# Patient Record
Sex: FEMALE | Race: BLACK OR AFRICAN AMERICAN | Employment: FULL TIME | URBAN - METROPOLITAN AREA
[De-identification: names, ages, dates, MRNs, and addresses within clinical notes are randomized per-mention and may not be internally consistent; named-entity substitution may affect disease eponyms.]

---

## 2019-02-13 ENCOUNTER — HOSPITAL ENCOUNTER (EMERGENCY)
Age: 34
Discharge: HOME | End: 2019-02-13
Attending: EMERGENCY MEDICINE
Payer: COMMERCIAL

## 2019-02-13 VITALS
WEIGHT: 169 LBS | BODY MASS INDEX: 26.53 KG/M2 | TEMPERATURE: 98.6 F | OXYGEN SATURATION: 99 % | HEIGHT: 67 IN | HEART RATE: 98 BPM | DIASTOLIC BLOOD PRESSURE: 75 MMHG | RESPIRATION RATE: 22 BRPM | SYSTOLIC BLOOD PRESSURE: 124 MMHG

## 2019-02-13 DIAGNOSIS — J10.1 INFLUENZA A: Primary | ICD-10-CM

## 2019-02-13 PROCEDURE — 99283 EMERGENCY DEPT VISIT LOW MDM: CPT

## 2019-02-13 RX ORDER — IBUPROFEN 800 MG/1
800 TABLET ORAL
Qty: 20 TAB | Refills: 0 | Status: SHIPPED | OUTPATIENT
Start: 2019-02-13 | End: 2019-02-20

## 2019-02-13 NOTE — LETTER
[facility] EMERGENCY DEPT 
[address] [place] [address] [address] [id number] 
[phone] Work/School Note Date: 2/13/2019 To Whom It May concern: 
 
Ajit Balderrama was seen and treated today in the emergency room by the following provider(s): 
Attending Provider: Altagracia Pierre MD.   
 
Ajit Balderrama may return to work on 02/15/19. Sincerely, Neal Caballero RN

## 2019-02-14 NOTE — ED PROVIDER NOTES
EMERGENCY DEPARTMENT HISTORY AND PHYSICAL EXAM 
 
 
Date: 2019 Patient Name: Viridiana Dennis History of Presenting Illness Chief Complaint Patient presents with  Nasal Congestion  Headache History Provided By: Patient HPI: Viridiana Dennis, 29 y.o. female presents ambulatory to the ED for evaluation of mild to moderate congestion, HA, cough x 2.5 weeks that worsened over the past 3 days. Pt also reports low grade fever at home today. She states she had been taking OTC medications with temporary relief and notes she did not take any today but she did take some olive leaf. She notes her daughters were both flu A positive at OSH yesterday. She states she got the flu shot this year. Pt specifically denies any abd pain, NVD, CP, SOB, numbness, weakness. There are no other complaints, changes, or physical findings at this time. PCP: None No current facility-administered medications on file prior to encounter. Current Outpatient Medications on File Prior to Encounter Medication Sig Dispense Refill  prenatal multivit-ca-min-fe-fa tab Take  by mouth daily.  promethazine (PHENERGAN) 25 mg suppository Insert 1 Suppository into rectum every six (6) hours as needed for Nausea. 15 Suppository 0  
 ondansetron (ZOFRAN ODT) 8 mg disintegrating tablet Take 1 Tab by mouth every eight (8) hours as needed for Nausea. 12 Tab 0 Past History Past Medical History: 
Past Medical History:  
Diagnosis Date  Other ill-defined conditions(099.89) HSV Past Surgical History: 
Past Surgical History:  
Procedure Laterality Date  HX  SECTION Family History: 
History reviewed. No pertinent family history. Social History: 
Social History Tobacco Use  Smoking status: Never Smoker  Smokeless tobacco: Never Used Substance Use Topics  Alcohol use: No  
  Comment: occ  Drug use: No  
 
 
Allergies: Allergies Allergen Reactions  Cabbage Itching and Nausea and Vomiting  Tape [Adhesive] Rash Review of Systems Review of Systems Constitutional: Positive for fever. Negative for chills and unexpected weight change. HENT: Positive for congestion. Negative for trouble swallowing. Eyes: Negative for discharge. Respiratory: Positive for cough. Negative for chest tightness and shortness of breath. Cardiovascular: Negative. Negative for chest pain. Gastrointestinal: Negative. Negative for abdominal distention, abdominal pain, constipation, diarrhea, nausea and vomiting. Endocrine: Negative. Genitourinary: Negative. Negative for difficulty urinating, dysuria, frequency and urgency. Musculoskeletal: Negative. Negative for arthralgias and myalgias. Skin: Negative. Negative for color change. Allergic/Immunologic: Negative. Neurological: Positive for headaches. Negative for dizziness, speech difficulty, weakness and numbness. Hematological: Negative. Psychiatric/Behavioral: Negative. Negative for agitation and confusion. All other systems reviewed and are negative. Physical Exam  
Physical Exam  
Constitutional: She is oriented to person, place, and time. She appears well-developed and well-nourished. HENT:  
Head: Normocephalic and atraumatic. Nose: Rhinorrhea present. Mouth/Throat: Posterior oropharyngeal erythema present. No oropharyngeal exudate or posterior oropharyngeal edema. Anterior cervical lymphadenopathy. Mild sinus tenderness. Eyes: Conjunctivae and EOM are normal.  
Neck: Neck supple. Cardiovascular: Normal rate, regular rhythm and intact distal pulses. Pulmonary/Chest: Effort normal. No respiratory distress. Abdominal: Soft. There is no tenderness. Musculoskeletal: Normal range of motion. She exhibits no deformity. Neurological: She is alert and oriented to person, place, and time. Skin: Skin is warm and dry. Psychiatric: She has a normal mood and affect. Her behavior is normal. Thought content normal.  
Vitals reviewed. Medical Decision Making I am the first provider for this patient. I reviewed the vital signs, available nursing notes, past medical history, past surgical history, family history and social history. Vital Signs-Reviewed the patient's vital signs. Patient Vitals for the past 12 hrs: 
 Temp Pulse Resp BP SpO2  
02/13/19 2035 98.6 °F (37 °C) 98 22 124/75 99 % Pulse Oximetry Analysis - 100% on RA Cardiac Monitor:  
Rate: 98 bpm 
 
Records Reviewed: Nursing Notes and Old Medical Records Provider Notes (Medical Decision Making): DDx: influenza, viral syndrome ED Course:  
Initial assessment performed. The patients presenting problems have been discussed, and they are in agreement with the care plan formulated and outlined with them. I have encouraged them to ask questions as they arise throughout their visit. Critical Care Time:  
none Disposition: 
DISCHARGE 
10:03 PM 
The patient has been re-evaluated and is ready for discharge. Reviewed available results with patient. Counseled pt on diagnosis and care plan. Pt has expressed understanding, and all questions have been answered. Pt agrees with plan and agrees to follow up as recommended, or return to the ED if their symptoms worsen. Discharge instructions have been provided and explained to the pt, along with reasons to return to the ED. PLAN: 
1. Discharge Medication List as of 2/13/2019 10:01 PM  
  
START taking these medications Details  
ibuprofen (MOTRIN) 800 mg tablet Take 1 Tab by mouth every six (6) hours as needed for Pain for up to 7 days. , Print, Disp-20 Tab, R-0  
  
ilfzoyqco-OF-okbunmmb-guaifen 5--200 mg cap Take 1 Tab by mouth every four (4) hours as needed. For congestion, Print, Disp-20 Cap, R-0  
  
  
CONTINUE these medications which have NOT CHANGED Details prenatal multivit-ca-min-fe-fa tab Take  by mouth daily. , Historical Med  
  
promethazine (PHENERGAN) 25 mg suppository Insert 1 Suppository into rectum every six (6) hours as needed for Nausea. , Print, Disp-15 Suppository, R-0  
  
ondansetron (ZOFRAN ODT) 8 mg disintegrating tablet Take 1 Tab by mouth every eight (8) hours as needed for Nausea. , Print, Disp-12 Tab, R-0  
  
  
 
2. Follow-up Information Follow up With Specialties Details Why Contact Info [id number]  Schedule an appointment as soon as possible for a visit  [address] [address] [facility] EMERGENCY DEPT Emergency Medicine  As needed, If symptoms worsen [address] Return to ED if worse Diagnosis Clinical Impression:  
1. Influenza A Attestations: This note is prepared by Robert Peraza, acting as Scribe for Saray Howell MD. 
 
Saray Howell MD: The scribe's documentation has been prepared under my direction and personally reviewed by me in its entirety. I confirm that the note above accurately reflects all work, treatment, procedures, and medical decision making performed by me.

## 2019-02-14 NOTE — DISCHARGE INSTRUCTIONS
Patient Education        Influenza (Flu): Care Instructions  Your Care Instructions    Influenza (flu) is an infection in the lungs and breathing passages. It is caused by the influenza virus. There are different strains, or types, of the flu virus from year to year. Unlike the common cold, the flu comes on suddenly and the symptoms, such as a cough, congestion, fever, chills, fatigue, aches, and pains, are more severe. These symptoms may last up to 10 days. Although the flu can make you feel very sick, it usually doesn't cause serious health problems. Home treatment is usually all you need for flu symptoms. But your doctor may prescribe antiviral medicine to prevent other health problems, such as pneumonia, from developing. Older people and those who have a long-term health condition, such as lung disease, are most at risk for having pneumonia or other health problems. Follow-up care is a key part of your treatment and safety. Be sure to make and go to all appointments, and call your doctor if you are having problems. It's also a good idea to know your test results and keep a list of the medicines you take. How can you care for yourself at home? · Get plenty of rest.  · Drink plenty of fluids, enough so that your urine is light yellow or clear like water. If you have kidney, heart, or liver disease and have to limit fluids, talk with your doctor before you increase the amount of fluids you drink. · Take an over-the-counter pain medicine if needed, such as acetaminophen (Tylenol), ibuprofen (Advil, Motrin), or naproxen (Aleve), to relieve fever, headache, and muscle aches. Read and follow all instructions on the label. No one younger than 20 should take aspirin. It has been linked to Reye syndrome, a serious illness. · Do not smoke. Smoking can make the flu worse. If you need help quitting, talk to your doctor about stop-smoking programs and medicines.  These can increase your chances of quitting for good.  · Breathe moist air from a hot shower or from a sink filled with hot water to help clear a stuffy nose. · Before you use cough and cold medicines, check the label. These medicines may not be safe for young children or for people with certain health problems. · If the skin around your nose and lips becomes sore, put some petroleum jelly on the area. · To ease coughing:  ? Drink fluids to soothe a scratchy throat. ? Suck on cough drops or plain hard candy. ? Take an over-the-counter cough medicine that contains dextromethorphan to help you get some sleep. Read and follow all instructions on the label. ? Raise your head at night with an extra pillow. This may help you rest if coughing keeps you awake. · Take any prescribed medicine exactly as directed. Call your doctor if you think you are having a problem with your medicine. To avoid spreading the flu  · Wash your hands regularly, and keep your hands away from your face. · Stay home from school, work, and other public places until you are feeling better and your fever has been gone for at least 24 hours. The fever needs to have gone away on its own without the help of medicine. · Ask people living with you to talk to their doctors about preventing the flu. They may get antiviral medicine to keep from getting the flu from you. · To prevent the flu in the future, get a flu vaccine every fall. Encourage people living with you to get the vaccine. · Cover your mouth when you cough or sneeze. When should you call for help? Call 911 anytime you think you may need emergency care.  For example, call if:    · You have severe trouble breathing.    Call your doctor now or seek immediate medical care if:    · You have new or worse trouble breathing.     · You seem to be getting much sicker.     · You feel very sleepy or confused.     · You have a new or higher fever.     · You get a new rash.    Watch closely for changes in your health, and be sure to contact your doctor if:    · You begin to get better and then get worse.     · You are not getting better after 1 week. Where can you learn more? Go to http://tere-arpita.info/. Enter O243 in the search box to learn more about \"Influenza (Flu): Care Instructions. \"  Current as of: September 5, 2018  Content Version: 11.9  © 4260-2680 [employer]. Care instructions adapted under license by [employer] (which disclaims liability or warranty for this information). If you have questions about a medical condition or this instruction, always ask your healthcare professional. [place] [address] any warranty or liability for your use of this information.

## 2019-02-14 NOTE — ED NOTES
Discharge instructions were given to the patient by Teresa Davey 
. The patient left the Emergency Department ambulatory, alert and oriented and in no acute distress with 2 prescriptions. The patient was encouraged to call or return to the ED for worsening issues or problems and was encouraged to schedule a follow up appointment for continuing care. The patient verbalized understanding of discharge instructions and prescriptions, all questions were answered. The patient has no further concerns at this time.

## 2019-02-14 NOTE — ED TRIAGE NOTES
Pt c/o congestion cough, runny nose x 3 days. Pt reports both children are flu positive. Pt reports receiving the flu shot this year.

## 2019-02-14 NOTE — ED NOTES
Pt presents ambulatory to ED complaining of dry cough, nasal congestion, frontal and posterior headache x3 days. Pt states her daughter tested positive for the flu this week. Pt states she took Theraflu, with no relief. Pt reports taking a natural medicine, \"olive leaf\". Pt is alert and oriented x 4, RR even and unlabored, skin is warm and dry. Assesment completed and pt updated on plan of care. Emergency Department Nursing Plan of Care The Nursing Plan of Care is developed from the Nursing assessment and Emergency Department Attending provider initial evaluation. The plan of care may be reviewed in the ED Provider note. The Plan of Care was developed with the following considerations:  
Patient / Family readiness to learn indicated by:verbalized understanding Persons(s) to be included in education: patient Barriers to Learning/Limitations:No 
 
Signed Amaris Pat [facility]   
2/13/2019   10:01 PM

## 2019-12-30 ENCOUNTER — HOSPITAL ENCOUNTER (EMERGENCY)
Age: 34
Discharge: HOME | End: 2019-12-30
Attending: EMERGENCY MEDICINE
Payer: COMMERCIAL

## 2019-12-30 VITALS
HEIGHT: 67 IN | HEART RATE: 87 BPM | RESPIRATION RATE: 16 BRPM | TEMPERATURE: 98.7 F | SYSTOLIC BLOOD PRESSURE: 101 MMHG | DIASTOLIC BLOOD PRESSURE: 60 MMHG | OXYGEN SATURATION: 100 % | BODY MASS INDEX: 21.66 KG/M2 | WEIGHT: 138 LBS

## 2019-12-30 DIAGNOSIS — J03.90 ACUTE TONSILLITIS, UNSPECIFIED ETIOLOGY: Primary | ICD-10-CM

## 2019-12-30 LAB
ANION GAP SERPL CALC-SCNC: 10 MMOL/L (ref 5–15)
BASOPHILS # BLD: 0 K/UL (ref 0–0.1)
BASOPHILS NFR BLD: 0 % (ref 0–1)
BUN SERPL-MCNC: 8 MG/DL (ref 6–20)
BUN/CREAT SERPL: 9 (ref 12–20)
CALCIUM SERPL-MCNC: 8.9 MG/DL (ref 8.5–10.1)
CHLORIDE SERPL-SCNC: 102 MMOL/L (ref 97–108)
CO2 SERPL-SCNC: 25 MMOL/L (ref 21–32)
CREAT SERPL-MCNC: 0.9 MG/DL (ref 0.55–1.02)
DEPRECATED S PYO AG THROAT QL EIA: NEGATIVE
DIFFERENTIAL METHOD BLD: ABNORMAL
EOSINOPHIL # BLD: 0 K/UL (ref 0–0.4)
EOSINOPHIL NFR BLD: 0 % (ref 0–7)
ERYTHROCYTE [DISTWIDTH] IN BLOOD BY AUTOMATED COUNT: 13.1 % (ref 11.5–14.5)
GLUCOSE SERPL-MCNC: 98 MG/DL (ref 65–100)
HCT VFR BLD AUTO: 37.3 % (ref 35–47)
HGB BLD-MCNC: 12.2 G/DL (ref 11.5–16)
IMM GRANULOCYTES # BLD AUTO: 0 K/UL
IMM GRANULOCYTES NFR BLD AUTO: 0 %
LACTATE SERPL-SCNC: 0.9 MMOL/L (ref 0.4–2)
LYMPHOCYTES # BLD: 1.5 K/UL (ref 0.8–3.5)
LYMPHOCYTES NFR BLD: 13 % (ref 12–49)
MCH RBC QN AUTO: 29.8 PG (ref 26–34)
MCHC RBC AUTO-ENTMCNC: 32.7 G/DL (ref 30–36.5)
MCV RBC AUTO: 91 FL (ref 80–99)
MONOCYTES # BLD: 0.1 K/UL (ref 0–1)
MONOCYTES NFR BLD: 1 % (ref 5–13)
NEUTS BAND NFR BLD MANUAL: 9 % (ref 0–6)
NEUTS SEG # BLD: 9.8 K/UL (ref 1.8–8)
NEUTS SEG NFR BLD: 77 % (ref 32–75)
NRBC # BLD: 0 K/UL (ref 0–0.01)
NRBC BLD-RTO: 0 PER 100 WBC
PLATELET # BLD AUTO: 303 K/UL (ref 150–400)
PMV BLD AUTO: 9.3 FL (ref 8.9–12.9)
POTASSIUM SERPL-SCNC: 3.6 MMOL/L (ref 3.5–5.1)
RBC # BLD AUTO: 4.1 M/UL (ref 3.8–5.2)
RBC MORPH BLD: ABNORMAL
SODIUM SERPL-SCNC: 137 MMOL/L (ref 136–145)
WBC # BLD AUTO: 11.4 K/UL (ref 3.6–11)

## 2019-12-30 PROCEDURE — 87880 STREP A ASSAY W/OPTIC: CPT

## 2019-12-30 PROCEDURE — 83605 ASSAY OF LACTIC ACID: CPT

## 2019-12-30 PROCEDURE — 96365 THER/PROPH/DIAG IV INF INIT: CPT

## 2019-12-30 PROCEDURE — 74011250636 HC RX REV CODE- 250/636: Performed by: NURSE PRACTITIONER

## 2019-12-30 PROCEDURE — 36415 COLL VENOUS BLD VENIPUNCTURE: CPT

## 2019-12-30 PROCEDURE — 74011250637 HC RX REV CODE- 250/637: Performed by: EMERGENCY MEDICINE

## 2019-12-30 PROCEDURE — 99285 EMERGENCY DEPT VISIT HI MDM: CPT

## 2019-12-30 PROCEDURE — 80048 BASIC METABOLIC PNL TOTAL CA: CPT

## 2019-12-30 PROCEDURE — 87070 CULTURE OTHR SPECIMN AEROBIC: CPT

## 2019-12-30 PROCEDURE — 87040 BLOOD CULTURE FOR BACTERIA: CPT

## 2019-12-30 PROCEDURE — 85025 COMPLETE CBC W/AUTO DIFF WBC: CPT

## 2019-12-30 PROCEDURE — 96375 TX/PRO/DX INJ NEW DRUG ADDON: CPT

## 2019-12-30 PROCEDURE — 74011000258 HC RX REV CODE- 258: Performed by: NURSE PRACTITIONER

## 2019-12-30 RX ORDER — ONDANSETRON 4 MG/1
4 TABLET, ORALLY DISINTEGRATING ORAL
Status: COMPLETED | OUTPATIENT
Start: 2019-12-30 | End: 2019-12-30

## 2019-12-30 RX ORDER — SODIUM CHLORIDE 0.9 % (FLUSH) 0.9 %
5-40 SYRINGE (ML) INJECTION EVERY 8 HOURS
Status: DISCONTINUED | OUTPATIENT
Start: 2019-12-30 | End: 2019-12-30 | Stop reason: HOSPADM

## 2019-12-30 RX ORDER — METHYLPREDNISOLONE 4 MG/1
TABLET ORAL
Qty: 1 DOSE PACK | Refills: 0 | Status: SHIPPED | OUTPATIENT
Start: 2019-12-30

## 2019-12-30 RX ORDER — PENICILLIN V POTASSIUM 500 MG/1
500 TABLET, FILM COATED ORAL 2 TIMES DAILY
Qty: 14 TAB | Refills: 0 | Status: SHIPPED | OUTPATIENT
Start: 2019-12-30 | End: 2020-01-06

## 2019-12-30 RX ORDER — ACETAMINOPHEN 500 MG
1000 TABLET ORAL
Status: COMPLETED | OUTPATIENT
Start: 2019-12-30 | End: 2019-12-30

## 2019-12-30 RX ORDER — DEXAMETHASONE SODIUM PHOSPHATE 100 MG/10ML
10 INJECTION INTRAMUSCULAR; INTRAVENOUS
Status: COMPLETED | OUTPATIENT
Start: 2019-12-30 | End: 2019-12-30

## 2019-12-30 RX ORDER — SODIUM CHLORIDE 0.9 % (FLUSH) 0.9 %
5-40 SYRINGE (ML) INJECTION AS NEEDED
Status: DISCONTINUED | OUTPATIENT
Start: 2019-12-30 | End: 2019-12-30 | Stop reason: HOSPADM

## 2019-12-30 RX ORDER — KETOROLAC TROMETHAMINE 30 MG/ML
30 INJECTION, SOLUTION INTRAMUSCULAR; INTRAVENOUS
Status: COMPLETED | OUTPATIENT
Start: 2019-12-30 | End: 2019-12-30

## 2019-12-30 RX ADMIN — CEFTRIAXONE SODIUM 1 G: 1 INJECTION, POWDER, FOR SOLUTION INTRAMUSCULAR; INTRAVENOUS at 16:15

## 2019-12-30 RX ADMIN — ONDANSETRON 4 MG: 4 TABLET, ORALLY DISINTEGRATING ORAL at 14:48

## 2019-12-30 RX ADMIN — DEXAMETHASONE SODIUM PHOSPHATE 10 MG: 10 INJECTION INTRAMUSCULAR; INTRAVENOUS at 15:24

## 2019-12-30 RX ADMIN — KETOROLAC TROMETHAMINE 30 MG: 30 INJECTION, SOLUTION INTRAMUSCULAR; INTRAVENOUS at 15:24

## 2019-12-30 RX ADMIN — ACETAMINOPHEN 1000 MG: 500 TABLET, FILM COATED ORAL at 14:48

## 2019-12-30 RX ADMIN — SODIUM CHLORIDE 1000 ML: 900 INJECTION, SOLUTION INTRAVENOUS at 15:25

## 2019-12-30 NOTE — ED NOTES
Discharge instructions were given to the patient by Marlon Hannon, JOSE MANUEL. The patient left the Emergency Department ambulatory, alert and oriented and in no acute distress with 2 prescriptions and a note. The patient was encouraged to call or return to the ED for worsening issues or problems and was encouraged to schedule a follow up appointment for continuing care. The patient verbalized understanding of discharge instructions and prescriptions, all questions were answered. The patient has no further concerns at this time.

## 2019-12-30 NOTE — DISCHARGE INSTRUCTIONS
Patient Education        Tonsillitis: Care Instructions  Your Care Instructions    Tonsillitis is an infection of the tonsils that is caused by bacteria or a virus. The tonsils are in the back of the throat and are part of the immune system. Tonsillitis typically lasts from a few days up to a couple of weeks. Tonsillitis caused by a virus goes away on its own. Tonsillitis caused by the bacteria that causes strep throat is treated with antibiotics. You and your doctor may consider surgery to remove the tonsils (tonsillectomy) if you have serious complications or repeat infections. Follow-up care is a key part of your treatment and safety. Be sure to make and go to all appointments, and call your doctor if you are having problems. It's also a good idea to know your test results and keep a list of the medicines you take. How can you care for yourself at home? · If your doctor prescribed antibiotics, take them as directed. Do not stop taking them just because you feel better. You need to take the full course of antibiotics. · Gargle with warm salt water. This helps reduce swelling and relieve discomfort. Gargle once an hour with 1 teaspoon of salt mixed in 8 fluid ounces of warm water. · Take an over-the-counter pain medicine, such as acetaminophen (Tylenol), ibuprofen (Advil, Motrin), or naproxen (Aleve). Be safe with medicines. Read and follow all instructions on the label. No one younger than 20 should take aspirin. It has been linked to Reye syndrome, a serious illness. · Be careful when taking over-the-counter cold or flu medicines and Tylenol at the same time. Many of these medicines have acetaminophen, which is Tylenol. Read the labels to make sure that you are not taking more than the recommended dose. Too much acetaminophen (Tylenol) can be harmful. · Try an over-the-counter throat spray to relieve throat pain. · Drink plenty of fluids. Fluids may help soothe an irritated throat.  Drink warm or cool liquids (whichever feels better). These include tea, soup, and juice. · Do not smoke, and avoid secondhand smoke. Smoking can make tonsillitis worse. If you need help quitting, talk to your doctor about stop-smoking programs and medicines. These can increase your chances of quitting for good. · Use a vaporizer or humidifier to add moisture to your bedroom. Follow the directions for cleaning the machine. When should you call for help? Call your doctor now or seek immediate medical care if:    · Your pain gets worse on one side of your throat.     · You have a new or higher fever.     · You notice changes in your voice.     · You have trouble opening your mouth.     · You have any trouble breathing.     · You have much more trouble swallowing.     · You have a fever with a stiff neck or a severe headache.     · You are sensitive to light or feel very sleepy or confused.    Watch closely for changes in your health, and be sure to contact your doctor if:    · You do not get better after 2 days. Where can you learn more? Go to http://tere-arpita.info/. Enter P812 in the search box to learn more about \"Tonsillitis: Care Instructions. \"  Current as of: October 21, 2018  Content Version: 12.2  © 8611-6417 [employer]. Care instructions adapted under license by [employer] (which disclaims liability or warranty for this information). If you have questions about a medical condition or this instruction, always ask your healthcare professional. [place] [address] any warranty or liability for your use of this information.

## 2019-12-30 NOTE — ED NOTES
Pt noted to be resting with lights dimmed and using her jacket as a blanket. Call bell in reach, lights dimmed for comfort.

## 2019-12-30 NOTE — ED PROVIDER NOTES
EMERGENCY DEPARTMENT HISTORY AND PHYSICAL EXAM      Date: 2019  Patient Name: Amauri Quevedo    History of Presenting Illness     Chief Complaint   Patient presents with    Sore Throat       History Provided By: Patient      Additional History (Context): Amauri Quevedo is a 36 y.o. female with No significant past medical history who presents with sore throat. Onset yesterday. Associated with fever, bodyaches and pain with swallowing. Pt states she is constantly spitting in a bottle. Denies exposure to sick contacts. Has not tried anything for sx. Denies hx of tonsillitis or strep throat. PCP: None    Current Facility-Administered Medications   Medication Dose Route Frequency Provider Last Rate Last Dose    sodium chloride (NS) flush 5-40 mL  5-40 mL IntraVENous Q8H Umu Lisa NP        sodium chloride (NS) flush 5-40 mL  5-40 mL IntraVENous PRN Umu Lisa NP         Current Outpatient Medications   Medication Sig Dispense Refill    penicillin v potassium (VEETID) 500 mg tablet Take 1 Tab by mouth two (2) times a day for 7 days. 14 Tab 0    methylPREDNISolone (MEDROL DOSEPACK) 4 mg tablet Use as directed 1 Dose Pack 0       Past History     Past Medical History:  Past Medical History:   Diagnosis Date    Other ill-defined conditions(074.91)     HSV       Past Surgical History:  Past Surgical History:   Procedure Laterality Date    HX  SECTION         Family History:  History reviewed. No pertinent family history. Social History:  Social History     Tobacco Use    Smoking status: Never Smoker    Smokeless tobacco: Never Used   Substance Use Topics    Alcohol use: No     Comment: occ    Drug use: No       Allergies: Allergies   Allergen Reactions    Cabbage Itching and Nausea and Vomiting    Tape [Adhesive] Rash         Review of Systems   Review of Systems   Constitutional: Positive for fever. Negative for appetite change, chills and fatigue.    HENT: Positive for sore throat. Negative for congestion, ear pain, postnasal drip and rhinorrhea. Eyes: Negative for pain and itching. Respiratory: Negative for cough, chest tightness, shortness of breath and wheezing. Cardiovascular: Negative for chest pain, palpitations and leg swelling. Gastrointestinal: Negative for abdominal pain, nausea and vomiting. Genitourinary: Negative for dysuria, frequency and urgency. Musculoskeletal: Positive for arthralgias. Negative for back pain and joint swelling. Skin: Negative for color change and rash. Neurological: Negative for dizziness, numbness and headaches. All other systems reviewed and are negative. Physical Exam     Vitals:    19 1405 19 1445 19 1532 19 1612   BP: 111/74 134/75 106/70 102/66   Pulse: (!) 130 (!) 113 (!) 105 96   Resp: 18 16 16 16   Temp: (!) 102.8 °F (39.3 °C)  99 °F (37.2 °C) 99.2 °F (37.3 °C)   SpO2: 99% 99% 99% 100%   Weight: 62.6 kg (138 lb)      Height: 5' 7\" (1.702 m)        Physical Exam  Vitals signs and nursing note reviewed. Constitutional:       General: She is not in acute distress. Appearance: She is well-developed. HENT:      Head: Normocephalic and atraumatic. Right Ear: Tympanic membrane and ear canal normal.      Left Ear: Tympanic membrane and ear canal normal.      Mouth/Throat:      Mouth: Mucous membranes are moist.      Pharynx: Uvula midline. Posterior oropharyngeal erythema present. Tonsils: Tonsillar exudate present. No tonsillar abscesses. Swellin+ on the right. Neck:      Musculoskeletal: Normal range of motion and neck supple. Cardiovascular:      Rate and Rhythm: Normal rate and regular rhythm. Heart sounds: Normal heart sounds. Pulmonary:      Effort: Pulmonary effort is normal.      Breath sounds: Normal breath sounds. Abdominal:      Palpations: Abdomen is soft. Musculoskeletal: Normal range of motion.    Lymphadenopathy:      Cervical: No cervical adenopathy. Skin:     General: Skin is warm and dry. Neurological:      Mental Status: She is alert and oriented to person, place, and time. Diagnostic Study Results     Labs -     Recent Results (from the past 12 hour(s))   STREP AG SCREEN, GROUP A    Collection Time: 12/30/19  3:19 PM   Result Value Ref Range    Group A Strep Ag ID NEGATIVE  NEG     CBC WITH AUTOMATED DIFF    Collection Time: 12/30/19  3:19 PM   Result Value Ref Range    WBC 11.4 (H) 3.6 - 11.0 K/uL    RBC 4.10 3.80 - 5.20 M/uL    HGB 12.2 11.5 - 16.0 g/dL    HCT 37.3 35.0 - 47.0 %    MCV 91.0 80.0 - 99.0 FL    MCH 29.8 26.0 - 34.0 PG    MCHC 32.7 30.0 - 36.5 g/dL    RDW 13.1 11.5 - 14.5 %    PLATELET 641 578 - 974 K/uL    MPV 9.3 8.9 - 12.9 FL    NRBC 0.0 0  WBC    ABSOLUTE NRBC 0.00 0.00 - 0.01 K/uL    NEUTROPHILS 77 (H) 32 - 75 %    BAND NEUTROPHILS 9 (H) 0 - 6 %    LYMPHOCYTES 13 12 - 49 %    MONOCYTES 1 (L) 5 - 13 %    EOSINOPHILS 0 0 - 7 %    BASOPHILS 0 0 - 1 %    IMMATURE GRANULOCYTES 0 %    ABS. NEUTROPHILS 9.8 (H) 1.8 - 8.0 K/UL    ABS. LYMPHOCYTES 1.5 0.8 - 3.5 K/UL    ABS. MONOCYTES 0.1 0.0 - 1.0 K/UL    ABS. EOSINOPHILS 0.0 0.0 - 0.4 K/UL    ABS. BASOPHILS 0.0 0.0 - 0.1 K/UL    ABS. IMM.  GRANS. 0.0 K/UL    DF SMEAR SCANNED      RBC COMMENTS NORMOCYTIC, NORMOCHROMIC     LACTIC ACID    Collection Time: 12/30/19  3:19 PM   Result Value Ref Range    Lactic acid 0.9 0.4 - 2.0 MMOL/L   METABOLIC PANEL, BASIC    Collection Time: 12/30/19  3:19 PM   Result Value Ref Range    Sodium 137 136 - 145 mmol/L    Potassium 3.6 3.5 - 5.1 mmol/L    Chloride 102 97 - 108 mmol/L    CO2 25 21 - 32 mmol/L    Anion gap 10 5 - 15 mmol/L    Glucose 98 65 - 100 mg/dL    BUN 8 6 - 20 MG/DL    Creatinine 0.90 0.55 - 1.02 MG/DL    BUN/Creatinine ratio 9 (L) 12 - 20      GFR est AA >60 >60 ml/min/1.73m2    GFR est non-AA >60 >60 ml/min/1.73m2    Calcium 8.9 8.5 - 10.1 MG/DL       Radiologic Studies -   No orders to display     CT Results (Last 48 hours)    None        CXR Results  (Last 48 hours)    None            Medical Decision Making   I am the first provider for this patient. I reviewed the vital signs, available nursing notes, past medical history, past surgical history, family history and social history. Vital Signs-Reviewed the patient's vital signs. Records Reviewed: Nursing Notes, Old Medical Records and Previous Laboratory Studies    35 yo F with c/o sore throat exhibiting R tonsillar exudate and swelling with fever noted on exam. Neg trismus and pt is able to handle secretions. Low suspicion for peritonsillar abscess. Will obtain labs, IVF and d/c home if unremarkable   ED Course:          Disposition:  Discharge     DISCHARGE NOTE:   5:02 PM    Pt has been reexamined. Patient has no new complaints, changes, or physical findings. Care plan outlined and precautions discussed. Results of labs were reviewed with the patient. All medications were reviewed with the patient; will d/c home with medrol dose pack and pen VK. All of pt's questions and concerns were addressed. Patient was instructed and agrees to follow up with PCP, as well as to return to the ED upon further deterioration. Patient is ready to go home. Follow-up Information     Follow up With Specialties Details Why [address]  In 1 week  [address]  [phone]          Current Discharge Medication List      START taking these medications    Details   penicillin v potassium (VEETID) 500 mg tablet Take 1 Tab by mouth two (2) times a day for 7 days.   Qty: 14 Tab, Refills: 0      methylPREDNISolone (MEDROL DOSEPACK) 4 mg tablet Use as directed  Qty: 1 Dose Pack, Refills: 0             Provider Notes (Medical Decision Making):   DDX: Viral vs. Strep Pharyngitis, Tonsillitis, Seasonal Allergies,  Postnasal Drip, peritonsillar abscess       Diagnosis     Clinical Impression:   1.  Acute tonsillitis, unspecified etiology

## 2019-12-30 NOTE — ED NOTES
Pt presents to ED ambulatory complaining of fever,generalized body aches, sore throat with redness, swelling and hoarseness, headaches, and nausea x2 days. Pt denies taking medications for relief of symptoms PTA. Pt is alert and oriented x 4, RR even and unlabored, skin is warm and dry. Assessment completed and pt updated on plan of care. Emergency Department Nursing Plan of Care       The Nursing Plan of Care is developed from the Nursing assessment and Emergency Department Attending provider initial evaluation. The plan of care may be reviewed in the ED Provider note.     The Plan of Care was developed with the following considerations:   Patient / Family readiness to learn indicated by:verbalized understanding  Persons(s) to be included in education: patient  Barriers to Learning/Limitations:No    Signed     Britta Sprague RN    12/30/2019   3:31 PM

## 2020-01-01 LAB
BACTERIA SPEC CULT: NORMAL
SERVICE CMNT-IMP: NORMAL

## 2020-01-04 LAB
BACTERIA SPEC CULT: NORMAL
SERVICE CMNT-IMP: NORMAL

## 2020-05-27 ENCOUNTER — HOSPITAL ENCOUNTER (EMERGENCY)
Age: 35
Discharge: HOME | End: 2020-05-27
Attending: EMERGENCY MEDICINE
Payer: COMMERCIAL

## 2020-05-27 VITALS
OXYGEN SATURATION: 99 % | RESPIRATION RATE: 17 BRPM | DIASTOLIC BLOOD PRESSURE: 67 MMHG | WEIGHT: 138 LBS | HEART RATE: 78 BPM | BODY MASS INDEX: 21.66 KG/M2 | HEIGHT: 67 IN | TEMPERATURE: 97.7 F | SYSTOLIC BLOOD PRESSURE: 103 MMHG

## 2020-05-27 DIAGNOSIS — R68.89 SENSATION OF FOREIGN BODY: ICD-10-CM

## 2020-05-27 DIAGNOSIS — T17.928A ASPIRATION OF FOOD, INITIAL ENCOUNTER: Primary | ICD-10-CM

## 2020-05-27 PROCEDURE — 99282 EMERGENCY DEPT VISIT SF MDM: CPT

## 2020-05-27 NOTE — ED PROVIDER NOTES
EMERGENCY DEPARTMENT HISTORY AND PHYSICAL EXAM    Date: 2020  Patient Name: Amadeo Guzman    History of Presenting Illness     Chief Complaint   Patient presents with    Foreign Body in [address]     pt reported some thing stuck in her rt nare x 3 days ago after she sneezing and food part stuck. History Provided By: Patient    HPI: Amadeo Guzman is a 36 y.o. female with a PMH of HSV who presents with patient to the right naris. Patient states 3 days ago she is eating a vegan burger when she started choking and felt part of the food come back up into her nose. Patient states she has done saline rinses at home but still feels some sort of congestion or fullness in the right naris and/or sinus. Patient states she is tried to blow her nose multiple times with no relief. Pt does report a polyp to the R naris and unsure if that has anything to do with symptoms. Pt denies any difficulties eating, drinking, breathing or swallowing. No pain reported    PCP: None    Current Outpatient Medications   Medication Sig Dispense Refill    methylPREDNISolone (MEDROL DOSEPACK) 4 mg tablet Use as directed 1 Dose Pack 0       Past History     Past Medical History:  Past Medical History:   Diagnosis Date    Other ill-defined conditions(286.47)     HSV       Past Surgical History:  Past Surgical History:   Procedure Laterality Date    HX  SECTION         Family History:  History reviewed. No pertinent family history. Social History:  Social History     Tobacco Use    Smoking status: Never Smoker    Smokeless tobacco: Never Used   Substance Use Topics    Alcohol use: Yes     Comment: occ    Drug use: No       Allergies: Allergies   Allergen Reactions    Cabbage Itching and Nausea and Vomiting    Tape [Adhesive] Rash         Review of Systems   Review of Systems   Constitutional: Negative for chills and fever. HENT: Positive for congestion.  Negative for postnasal drip, sinus pressure and sore throat. Respiratory: Negative for shortness of breath. Allergic/Immunologic: Positive for food allergies. Negative for immunocompromised state. Neurological: Negative for speech difficulty and weakness. All other systems reviewed and are negative. Physical Exam     Vitals:    05/27/20 1055   BP: 103/67   Pulse: 78   Resp: 17   Temp: 97.7 °F (36.5 °C)   SpO2: 99%   Weight: 62.6 kg (138 lb)   Height: 5' 7\" (1.702 m)     Physical Exam  Vitals signs and nursing note reviewed. Constitutional:       General: She is not in acute distress. Appearance: She is well-developed. HENT:      Head: Normocephalic and atraumatic. Nose:      Right Nostril: No foreign body or epistaxis. Comments: +polyp in the R nostril  Eyes:      Conjunctiva/sclera: Conjunctivae normal.   Cardiovascular:      Rate and Rhythm: Normal rate and regular rhythm. Heart sounds: Normal heart sounds. Pulmonary:      Effort: Pulmonary effort is normal. No respiratory distress. Breath sounds: Normal breath sounds. No wheezing or rales. Skin:     General: Skin is warm and dry. Neurological:      Mental Status: She is alert and oriented to person, place, and time. Psychiatric:         Behavior: Behavior normal.         Thought Content: Thought content normal.         Judgment: Judgment normal.           Diagnostic Study Results     Labs -   No results found for this or any previous visit (from the past 12 hour(s)). Radiologic Studies -   No orders to display     CT Results  (Last 48 hours)    None        CXR Results  (Last 48 hours)    None            Medical Decision Making   I am the first provider for this patient. I reviewed the vital signs, available nursing notes, past medical history, past surgical history, family history and social history. Vital Signs-Reviewed the patient's vital signs.     Disposition:  Discharged    DISCHARGE NOTE:   11:48 AM      Care plan outlined and precautions discussed. Patient has no new complaints, changes, or physical findings. All medications were reviewed with the patient; will d/c home. All of pt's questions and concerns were addressed. Patient was instructed and agrees to follow up with ENT, as well as to return to the ED upon further deterioration. Patient is ready to go home. Follow-up Information     Follow up With Specialties Details Why [address] DEPT OF OTOLARYNGOLOGY    [address] [place] [address]    Monster Guerrero MD Otolaryngology Schedule an appointment as soon as possible for a visit in 1 week As needed [address] [facility] an appointment as soon as possible for a visit As needed [address]          Current Discharge Medication List          Provider Notes (Medical Decision Making):   Presents with sensation of foreign body to the right nostril. Symptoms present after patient choking and discussed with patient the likelihood of actual food in nostril is low. Patient advised to continue saline rinses and/or use Margarette pot. Also discussed taking over-the-counter allergy medication. If all else fails in a week patient advised to follow-up with ENT for further examination. Patient in agreement with plan. No other work-up warranted at this time. Procedures:  Procedures    Please note that this dictation was completed with Dragon, computer voice recognition software. Quite often unanticipated grammatical, syntax, homophones, and other interpretive errors are inadvertently transcribed by the computer software. Please disregard these errors. Additionally, please excuse any errors that have escaped final proofreading. Diagnosis     Clinical Impression:   1. Aspiration of food, initial encounter    2.  Sensation of foreign body

## 2020-05-27 NOTE — DISCHARGE INSTRUCTIONS
Patient Education        Saline Nasal Washes: Care Instructions  Your Care Instructions  Saline nasal washes help keep the nasal passages open by washing out thick or dried mucus. This simple remedy can help relieve symptoms of allergies, sinusitis, and colds. It also can make the nose feel more comfortable by keeping the mucous membranes moist. You may notice a little burning sensation in your nose the first few times you use the solution, but this usually gets better in a few days. Follow-up care is a key part of your treatment and safety. Be sure to make and go to all appointments, and call your doctor if you are having problems. It's also a good idea to know your test results and keep a list of the medicines you take. How can you care for yourself at home? · You can buy premixed saline solution in a squeeze bottle or other sinus rinse products at a drugstore. Read and follow the instructions on the label. · You also can make your own saline solution by adding 1 teaspoon of salt and 1 teaspoon of baking soda to 2 cups of distilled water. · If you use a homemade solution, pour a small amount into a clean bowl. Using a rubber bulb syringe, squeeze the syringe and place the tip in the salt water. Pull a small amount of the salt water into the syringe by relaxing your hand. · Sit down with your head tilted slightly back. Do not lie down. Put the tip of the bulb syringe or the squeeze bottle a little way into one of your nostrils. Gently drip or squirt a few drops into the nostril. Repeat with the other nostril. Some sneezing and gagging are normal at first.  · Gently blow your nose. · Wipe the syringe or bottle tip clean after each use. · Repeat this 2 or 3 times a day. · Use nasal washes gently if you have nosebleeds often. When should you call for help?   Watch closely for changes in your health, and be sure to contact your doctor if:    · You often get nosebleeds.     · You have problems doing the nasal washes. Where can you learn more? Go to http://tere-arpita.info/  Enter B784 in the search box to learn more about \"Saline Nasal Washes: Care Instructions. \"  Current as of: July 28, 2019Content Version: 12.4  © 4966-8063 [employer]. Care instructions adapted under license by [employer] (which disclaims liability or warranty for this information). If you have questions about a medical condition or this instruction, always ask your healthcare professional. [address] any warranty or liability for your use of this information.

## 2020-05-27 NOTE — ED NOTES
Pt presents to ED ambulatory complaining of having choked on a burger 3 days ago. Patient states in the process of choking she felt like some of the food lodged in her right nare and is unable to get it out. She reports flushing her nose with saline and it still feels like something is there. Patient in no obvious distress. Pt is alert and oriented x 4, RR even and unlabored, skin is warm and dry. Assessment completed and pt updated on plan of care. Call bell in reach. Emergency Department Nursing Plan of Care       The Nursing Plan of Care is developed from the Nursing assessment and Emergency Department Attending provider initial evaluation. The plan of care may be reviewed in the ED Provider note.     The Plan of Care was developed with the following considerations:   Patient / Family readiness to learn indicated by:verbalized understanding  Persons(s) to be included in education: patient  Barriers to Learning/Limitations:No    Signed     Ruchi Dodd RN    5/27/2020   11:08 AM

## 2020-05-27 NOTE — ED NOTES
Discharge instructions were given to the patient by Jackie Sterling RN. The patient left the Emergency Department ambulatory, alert and oriented and in no acute distress with no prescriptions. The patient was encouraged to call or return to the ED for worsening issues or problems and was encouraged to schedule a follow up appointment for continuing care. The patient verbalized understanding of discharge instructions and prescriptions, all questions were answered. The patient has no further concerns at this time.

## 2020-08-27 ENCOUNTER — HOSPITAL ENCOUNTER (EMERGENCY)
Age: 35
Discharge: HOME | End: 2020-08-27
Attending: EMERGENCY MEDICINE | Admitting: EMERGENCY MEDICINE

## 2020-08-27 VITALS
BODY MASS INDEX: 21.97 KG/M2 | WEIGHT: 140 LBS | DIASTOLIC BLOOD PRESSURE: 72 MMHG | TEMPERATURE: 99.8 F | RESPIRATION RATE: 18 BRPM | SYSTOLIC BLOOD PRESSURE: 117 MMHG | HEIGHT: 67 IN | HEART RATE: 80 BPM | OXYGEN SATURATION: 99 %

## 2020-08-27 DIAGNOSIS — J03.90 ACUTE TONSILLITIS, UNSPECIFIED ETIOLOGY: Primary | ICD-10-CM

## 2020-08-27 LAB
ALBUMIN SERPL-MCNC: 3.8 G/DL (ref 3.5–5)
ALBUMIN/GLOB SERPL: 1 {RATIO} (ref 1.1–2.2)
ALP SERPL-CCNC: 55 U/L (ref 45–117)
ALT SERPL-CCNC: 13 U/L (ref 12–78)
ANION GAP SERPL CALC-SCNC: 8 MMOL/L (ref 5–15)
APPEARANCE UR: CLEAR
AST SERPL-CCNC: 10 U/L (ref 15–37)
BACTERIA URNS QL MICRO: NEGATIVE /HPF
BASOPHILS # BLD: 0 K/UL (ref 0–0.1)
BASOPHILS NFR BLD: 0 % (ref 0–1)
BILIRUB SERPL-MCNC: 0.3 MG/DL (ref 0.2–1)
BILIRUB UR QL: NEGATIVE
BUN SERPL-MCNC: 9 MG/DL (ref 6–20)
BUN/CREAT SERPL: 9 (ref 12–20)
CALCIUM SERPL-MCNC: 9 MG/DL (ref 8.5–10.1)
CHLORIDE SERPL-SCNC: 102 MMOL/L (ref 97–108)
CO2 SERPL-SCNC: 30 MMOL/L (ref 21–32)
COLOR UR: ABNORMAL
CREAT SERPL-MCNC: 1 MG/DL (ref 0.55–1.02)
DEPRECATED S PYO AG THROAT QL EIA: NEGATIVE
DIFFERENTIAL METHOD BLD: ABNORMAL
EOSINOPHIL # BLD: 0 K/UL (ref 0–0.4)
EOSINOPHIL NFR BLD: 0 % (ref 0–7)
EPITH CASTS URNS QL MICRO: ABNORMAL /LPF
ERYTHROCYTE [DISTWIDTH] IN BLOOD BY AUTOMATED COUNT: 13 % (ref 11.5–14.5)
GLOBULIN SER CALC-MCNC: 3.7 G/DL (ref 2–4)
GLUCOSE SERPL-MCNC: 124 MG/DL (ref 65–100)
GLUCOSE UR STRIP.AUTO-MCNC: NEGATIVE MG/DL
HCG UR QL: NEGATIVE
HCT VFR BLD AUTO: 34.4 % (ref 35–47)
HETEROPH AB BLD QL IA: NEGATIVE
HGB BLD-MCNC: 11.4 G/DL (ref 11.5–16)
HGB UR QL STRIP: ABNORMAL
IMM GRANULOCYTES # BLD AUTO: 0 K/UL (ref 0–0.04)
IMM GRANULOCYTES NFR BLD AUTO: 0 % (ref 0–0.5)
KETONES UR QL STRIP.AUTO: NEGATIVE MG/DL
LEUKOCYTE ESTERASE UR QL STRIP.AUTO: NEGATIVE
LYMPHOCYTES # BLD: 1 K/UL (ref 0.8–3.5)
LYMPHOCYTES NFR BLD: 10 % (ref 12–49)
MCH RBC QN AUTO: 31 PG (ref 26–34)
MCHC RBC AUTO-ENTMCNC: 33.1 G/DL (ref 30–36.5)
MCV RBC AUTO: 93.5 FL (ref 80–99)
MONOCYTES # BLD: 0.6 K/UL (ref 0–1)
MONOCYTES NFR BLD: 6 % (ref 5–13)
NEUTS SEG # BLD: 7.9 K/UL (ref 1.8–8)
NEUTS SEG NFR BLD: 84 % (ref 32–75)
NITRITE UR QL STRIP.AUTO: NEGATIVE
NRBC # BLD: 0 K/UL (ref 0–0.01)
NRBC BLD-RTO: 0 PER 100 WBC
PH UR STRIP: 7.5 [PH] (ref 5–8)
PLATELET # BLD AUTO: 283 K/UL (ref 150–400)
PMV BLD AUTO: 9.8 FL (ref 8.9–12.9)
POTASSIUM SERPL-SCNC: 3.4 MMOL/L (ref 3.5–5.1)
PROT SERPL-MCNC: 7.5 G/DL (ref 6.4–8.2)
PROT UR STRIP-MCNC: NEGATIVE MG/DL
RBC # BLD AUTO: 3.68 M/UL (ref 3.8–5.2)
RBC #/AREA URNS HPF: ABNORMAL /HPF (ref 0–5)
SODIUM SERPL-SCNC: 140 MMOL/L (ref 136–145)
SP GR UR REFRACTOMETRY: 1.02 (ref 1–1.03)
UA: UC IF INDICATED,UAUC: ABNORMAL
UROBILINOGEN UR QL STRIP.AUTO: 1 EU/DL (ref 0.2–1)
WBC # BLD AUTO: 9.6 K/UL (ref 3.6–11)
WBC URNS QL MICRO: ABNORMAL /HPF (ref 0–4)

## 2020-08-27 PROCEDURE — 36415 COLL VENOUS BLD VENIPUNCTURE: CPT

## 2020-08-27 PROCEDURE — 85025 COMPLETE CBC W/AUTO DIFF WBC: CPT

## 2020-08-27 PROCEDURE — 81025 URINE PREGNANCY TEST: CPT

## 2020-08-27 PROCEDURE — 87147 CULTURE TYPE IMMUNOLOGIC: CPT

## 2020-08-27 PROCEDURE — 87635 SARS-COV-2 COVID-19 AMP PRB: CPT

## 2020-08-27 PROCEDURE — 87880 STREP A ASSAY W/OPTIC: CPT

## 2020-08-27 PROCEDURE — 74011250636 HC RX REV CODE- 250/636: Performed by: EMERGENCY MEDICINE

## 2020-08-27 PROCEDURE — 81001 URINALYSIS AUTO W/SCOPE: CPT

## 2020-08-27 PROCEDURE — 87070 CULTURE OTHR SPECIMN AEROBIC: CPT

## 2020-08-27 PROCEDURE — 80053 COMPREHEN METABOLIC PANEL: CPT

## 2020-08-27 PROCEDURE — 99285 EMERGENCY DEPT VISIT HI MDM: CPT

## 2020-08-27 PROCEDURE — 86308 HETEROPHILE ANTIBODY SCREEN: CPT

## 2020-08-27 PROCEDURE — 74011250637 HC RX REV CODE- 250/637: Performed by: EMERGENCY MEDICINE

## 2020-08-27 PROCEDURE — 96374 THER/PROPH/DIAG INJ IV PUSH: CPT

## 2020-08-27 RX ORDER — ACETAMINOPHEN 500 MG
1000 TABLET ORAL
Status: COMPLETED | OUTPATIENT
Start: 2020-08-27 | End: 2020-08-27

## 2020-08-27 RX ORDER — IBUPROFEN 800 MG/1
800 TABLET ORAL
Qty: 20 TAB | Refills: 0 | Status: SHIPPED | OUTPATIENT
Start: 2020-08-27 | End: 2020-09-03

## 2020-08-27 RX ORDER — AMOXICILLIN AND CLAVULANATE POTASSIUM 875; 125 MG/1; MG/1
1 TABLET, FILM COATED ORAL 2 TIMES DAILY
Qty: 20 TAB | Refills: 0 | Status: SHIPPED | OUTPATIENT
Start: 2020-08-27 | End: 2020-09-06

## 2020-08-27 RX ORDER — DEXAMETHASONE SODIUM PHOSPHATE 4 MG/ML
10 INJECTION, SOLUTION INTRA-ARTICULAR; INTRALESIONAL; INTRAMUSCULAR; INTRAVENOUS; SOFT TISSUE
Status: COMPLETED | OUTPATIENT
Start: 2020-08-27 | End: 2020-08-27

## 2020-08-27 RX ADMIN — DEXAMETHASONE SODIUM PHOSPHATE 10 MG: 4 INJECTION INTRA-ARTICULAR; INTRALESIONAL; INTRAMUSCULAR; INTRAVENOUS; SOFT TISSUE at 21:09

## 2020-08-27 RX ADMIN — ACETAMINOPHEN 1000 MG: 500 TABLET ORAL at 21:09

## 2020-08-27 RX ADMIN — SODIUM CHLORIDE 1000 ML: 900 INJECTION, SOLUTION INTRAVENOUS at 21:13

## 2020-08-27 NOTE — LETTER
[facility] EMERGENCY DEPT 
[address] [facility] [id number] 
[phone] Work/School Note Date: 8/27/2020 To Whom It May concern: 
 
Edmar Garcia was seen and treated today in the emergency room by the following provider(s): 
Attending Provider: Chan Aguilera MD.   
 
Edmar Garcia may return to work after negative covid test result. If covid test result is positive, she may return on 9/9/20 if she has been fever-free for 3 days. Sincerely, Letha Samuels MD

## 2020-08-28 ENCOUNTER — TELEPHONE (OUTPATIENT)
Dept: CASE MANAGEMENT | Age: 35
End: 2020-08-28

## 2020-08-28 ENCOUNTER — PATIENT OUTREACH (OUTPATIENT)
Dept: CASE MANAGEMENT | Age: 35
End: 2020-08-28

## 2020-08-28 DIAGNOSIS — Z71.89 OTHER PARENT-CHILD PROBLEMS: Primary | ICD-10-CM

## 2020-08-28 DIAGNOSIS — Z71.89 ADVANCED DIRECTIVES, COUNSELING/DISCUSSION: ICD-10-CM

## 2020-08-28 DIAGNOSIS — Z62.820 OTHER PARENT-CHILD PROBLEMS: Primary | ICD-10-CM

## 2020-08-28 LAB
COVID-19, XGCOVT: NOT DETECTED
HEALTH STATUS, XMCV2T: NORMAL
SOURCE, COVRS: NORMAL
SPECIMEN SOURCE, FCOV2M: NORMAL
SPECIMEN TYPE, XMCV1T: NORMAL

## 2020-08-28 NOTE — ED NOTES
Discharge instructions were given to the patient by Marlin Nolan RN. The patient left the Emergency Department ambulatory, alert and oriented and in no acute distress with 2 paper prescriptions. The patient was encouraged to call or return to the ED for worsening issues or problems and was encouraged to schedule a follow up appointment for continuing care. The patient verbalized understanding of discharge instructions and prescriptions, all questions were answered. The patient has no further concerns at this time.

## 2020-08-28 NOTE — ED PROVIDER NOTES
43-year-old female presents with chief complaint of, \"I think my tonsils are inflamed again. \"  Awoke yesterday with right tonsillar pain and since then pain has progressed to both tonsils. Reports associated backache, slight cough, and headache. Denies congestion, earache, shortness of breath, nausea, vomiting, abdominal pain, rash. No known COVID contacts. No recent travel. Past Medical History:   Diagnosis Date    Other ill-defined conditions(289.55)     HSV       Past Surgical History:   Procedure Laterality Date    HX  SECTION           History reviewed. No pertinent family history.     Social History     Socioeconomic History    Marital status: SINGLE     Spouse name: Not on file    Number of children: Not on file    Years of education: Not on file    Highest education level: Not on file   Occupational History    Not on file   Social Needs    Financial resource strain: Not on file    Food insecurity     Worry: Not on file     Inability: Not on file    Transportation needs     Medical: Not on file     Non-medical: Not on file   Tobacco Use    Smoking status: Never Smoker    Smokeless tobacco: Never Used   Substance and Sexual Activity    Alcohol use: Yes     Comment: occ    Drug use: No    Sexual activity: Not Currently   Lifestyle    Physical activity     Days per week: Not on file     Minutes per session: Not on file    Stress: Not on file   Relationships    Social connections     Talks on phone: Not on file     Gets together: Not on file     Attends Oriental orthodox service: Not on file     Active member of club or organization: Not on file     Attends meetings of clubs or organizations: Not on file     Relationship status: Not on file    Intimate partner violence     Fear of current or ex partner: Not on file     Emotionally abused: Not on file     Physically abused: Not on file     Forced sexual activity: Not on file   Other Topics Concern    Not on file   Social History Narrative    Not on file         ALLERGIES: Cabbage and Tape [adhesive]    Review of Systems   Constitutional: Positive for fever. Negative for chills and unexpected weight change. HENT: Positive for sore throat. Negative for congestion and trouble swallowing. Eyes: Negative for discharge. Respiratory: Positive for cough. Negative for chest tightness and shortness of breath. Cardiovascular: Negative. Negative for chest pain. Gastrointestinal: Negative. Negative for abdominal distention, abdominal pain, constipation, diarrhea and nausea. Endocrine: Negative. Genitourinary: Negative. Negative for difficulty urinating, dysuria, frequency and urgency. Musculoskeletal: Positive for back pain and myalgias. Negative for arthralgias. Skin: Negative. Negative for color change. Allergic/Immunologic: Negative. Neurological: Negative. Negative for dizziness, speech difficulty and headaches. Hematological: Negative. Psychiatric/Behavioral: Negative. Negative for agitation and confusion. All other systems reviewed and are negative. Vitals:    08/27/20 2012   BP: 109/76   Pulse: (!) 117   Resp: 18   Temp: (!) 102.5 °F (39.2 °C)   SpO2: 100%   Weight: 63.5 kg (140 lb)   Height: 5' 7\" (1.702 m)            Physical Exam  Vitals signs and nursing note reviewed. Constitutional:       Appearance: She is well-developed. She is not toxic-appearing. Comments: Nontoxic appearing. HENT:      Head: Normocephalic and atraumatic. Comments: Bilateral tonsillar erythema and enlargement. Mouth/Throat: Tonsils: 3+ on the right. 3+ on the left. Eyes:      Conjunctiva/sclera: Conjunctivae normal.   Neck:      Musculoskeletal: Neck supple. Cardiovascular:      Rate and Rhythm: Regular rhythm. Tachycardia present. Pulmonary:      Effort: Pulmonary effort is normal. No respiratory distress. Abdominal:      Palpations: Abdomen is soft. Tenderness:  There is no abdominal tenderness. Musculoskeletal: Normal range of motion. General: No deformity. Skin:     General: Skin is warm and dry. Neurological:      Mental Status: She is alert and oriented to person, place, and time. Psychiatric:         Behavior: Behavior normal.         Thought Content: Thought content normal.          [facility]  ED Course as of Aug 28 0241   Thu Aug 27, 2020   2205 Feeling better.    [SS]      ED Course User Index  [SS] Christo Cueva MD       Procedures    LABORATORY TESTS:  Recent Results (from the past 12 hour(s))   CBC WITH AUTOMATED DIFF    Collection Time: 08/27/20  8:40 PM   Result Value Ref Range    WBC 9.6 3.6 - 11.0 K/uL    RBC 3.68 (L) 3.80 - 5.20 M/uL    HGB 11.4 (L) 11.5 - 16.0 g/dL    HCT 34.4 (L) 35.0 - 47.0 %    MCV 93.5 80.0 - 99.0 FL    MCH 31.0 26.0 - 34.0 PG    MCHC 33.1 30.0 - 36.5 g/dL    RDW 13.0 11.5 - 14.5 %    PLATELET 440 334 - 573 K/uL    MPV 9.8 8.9 - 12.9 FL    NRBC 0.0 0  WBC    ABSOLUTE NRBC 0.00 0.00 - 0.01 K/uL    NEUTROPHILS 84 (H) 32 - 75 %    LYMPHOCYTES 10 (L) 12 - 49 %    MONOCYTES 6 5 - 13 %    EOSINOPHILS 0 0 - 7 %    BASOPHILS 0 0 - 1 %    IMMATURE GRANULOCYTES 0 0.0 - 0.5 %    ABS. NEUTROPHILS 7.9 1.8 - 8.0 K/UL    ABS. LYMPHOCYTES 1.0 0.8 - 3.5 K/UL    ABS. MONOCYTES 0.6 0.0 - 1.0 K/UL    ABS. EOSINOPHILS 0.0 0.0 - 0.4 K/UL    ABS. BASOPHILS 0.0 0.0 - 0.1 K/UL    ABS. IMM.  GRANS. 0.0 0.00 - 0.04 K/UL    DF AUTOMATED     METABOLIC PANEL, COMPREHENSIVE    Collection Time: 08/27/20  8:40 PM   Result Value Ref Range    Sodium 140 136 - 145 mmol/L    Potassium 3.4 (L) 3.5 - 5.1 mmol/L    Chloride 102 97 - 108 mmol/L    CO2 30 21 - 32 mmol/L    Anion gap 8 5 - 15 mmol/L    Glucose 124 (H) 65 - 100 mg/dL    BUN 9 6 - 20 MG/DL    Creatinine 1.00 0.55 - 1.02 MG/DL    BUN/Creatinine ratio 9 (L) 12 - 20      GFR est AA >60 >60 ml/min/1.73m2    GFR est non-AA >60 >60 ml/min/1.73m2    Calcium 9.0 8.5 - 10.1 MG/DL    Bilirubin, total 0.3 0.2 - 1.0 MG/DL    ALT (SGPT) 13 12 - 78 U/L    AST (SGOT) 10 (L) 15 - 37 U/L    Alk. phosphatase 55 45 - 117 U/L    Protein, total 7.5 6.4 - 8.2 g/dL    Albumin 3.8 3.5 - 5.0 g/dL    Globulin 3.7 2.0 - 4.0 g/dL    A-G Ratio 1.0 (L) 1.1 - 2.2     URINALYSIS W/ REFLEX CULTURE    Collection Time: 08/27/20  8:40 PM    Specimen: Urine   Result Value Ref Range    Color YELLOW/STRAW      Appearance CLEAR CLEAR      Specific gravity 1.020 1.003 - 1.030      pH (UA) 7.5 5.0 - 8.0      Protein Negative NEG mg/dL    Glucose Negative NEG mg/dL    Ketone Negative NEG mg/dL    Bilirubin Negative NEG      Blood SMALL (A) NEG      Urobilinogen 1.0 0.2 - 1.0 EU/dL    Nitrites Negative NEG      Leukocyte Esterase Negative NEG      WBC 0-4 0 - 4 /hpf    RBC 0-5 0 - 5 /hpf    Epithelial cells FEW FEW /lpf    Bacteria Negative NEG /hpf    UA:UC IF INDICATED CULTURE NOT INDICATED BY UA RESULT CNI     HCG URINE, QL. - POC    Collection Time: 08/27/20  8:42 PM   Result Value Ref Range    Pregnancy test,urine (POC) Negative NEG     STREP AG SCREEN, GROUP A    Collection Time: 08/27/20  8:45 PM    Specimen: Serum   Result Value Ref Range    Group A Strep Ag ID Negative NEG     MONONUCLEOSIS SCREEN    Collection Time: 08/27/20  8:45 PM   Result Value Ref Range    Mononucleosis screen Negative NEG     SARS-COV-2    Collection Time: 08/27/20 10:28 PM   Result Value Ref Range    Specimen source Nasopharyngeal      SARS-CoV-2 PENDING     SARS-CoV-2 PENDING     Specimen source Nasopharyngeal      COVID-19 rapid test PENDING     Specimen type NP Swab      Health status PENDING     COVID-19 PENDING        IMAGING RESULTS:  No orders to display       MEDICATIONS GIVEN:  Medications   acetaminophen (TYLENOL) tablet 1,000 mg (1,000 mg Oral Given 8/27/20 2109)   dexamethasone (DECADRON) 4 mg/mL injection 10 mg (10 mg IntraVENous Given 8/27/20 2109)   sodium chloride 0.9 % bolus infusion 1,000 mL (0 mL IntraVENous IV Completed 8/27/20 2146)       IMPRESSION:  1.  Acute tonsillitis, unspecified etiology        PLAN:  1. Discharge Medication List as of 8/27/2020 10:25 PM      START taking these medications    Details   ibuprofen (MOTRIN) 800 mg tablet Take 1 Tab by mouth every six (6) hours as needed for Pain for up to 7 days. , Print, Disp-20 Tab,R-0      amoxicillin-clavulanate (Augmentin) 875-125 mg per tablet Take 1 Tab by mouth two (2) times a day for 10 days. , Print, Disp-20 Tab,R-0         CONTINUE these medications which have NOT CHANGED    Details   methylPREDNISolone (MEDROL DOSEPACK) 4 mg tablet Use as directed, Print, Disp-1 Dose Pack, R-0           2.    Follow-up Information     Follow up With Specialties Details Why [address]  Schedule an appointment as soon as possible for a visit to establish primary care [address] [place] [id number] [phone]    [facility] EMERGENCY DEPT Emergency Medicine  As needed, If symptoms worsen [address] [phone]        Return to ED if worse

## 2020-08-28 NOTE — DISCHARGE INSTRUCTIONS
Patient Education        Tonsillitis: Care Instructions  Your Care Instructions     Tonsillitis is an infection of the tonsils that is caused by bacteria or a virus. The tonsils are in the back of the throat and are part of the immune system. Tonsillitis typically lasts from a few days up to a couple of weeks. Tonsillitis caused by a virus goes away on its own. Tonsillitis caused by the bacteria that causes strep throat is treated with antibiotics. You and your doctor may consider surgery to remove the tonsils (tonsillectomy) if you have serious complications or repeat infections. Follow-up care is a key part of your treatment and safety. Be sure to make and go to all appointments, and call your doctor if you are having problems. It's also a good idea to know your test results and keep a list of the medicines you take. How can you care for yourself at home? · If your doctor prescribed antibiotics, take them as directed. Do not stop taking them just because you feel better. You need to take the full course of antibiotics. · Gargle with warm salt water. This helps reduce swelling and relieve discomfort. Gargle once an hour with 1 teaspoon of salt mixed in 8 fluid ounces of warm water. · Take an over-the-counter pain medicine, such as acetaminophen (Tylenol), ibuprofen (Advil, Motrin), or naproxen (Aleve). Be safe with medicines. Read and follow all instructions on the label. No one younger than 20 should take aspirin. It has been linked to Reye syndrome, a serious illness. · Be careful when taking over-the-counter cold or flu medicines and Tylenol at the same time. Many of these medicines have acetaminophen, which is Tylenol. Read the labels to make sure that you are not taking more than the recommended dose. Too much acetaminophen (Tylenol) can be harmful. · Try an over-the-counter throat spray to relieve throat pain. · Drink plenty of fluids. Fluids may help soothe an irritated throat.  Drink warm or cool liquids (whichever feels better). These include tea, soup, and juice. · Do not smoke, and avoid secondhand smoke. Smoking can make tonsillitis worse. If you need help quitting, talk to your doctor about stop-smoking programs and medicines. These can increase your chances of quitting for good. · Use a vaporizer or humidifier to add moisture to your bedroom. Follow the directions for cleaning the machine. When should you call for help? Call your doctor now or seek immediate medical care if:  · Your pain gets worse on one side of your throat. · You have a new or higher fever. · You notice changes in your voice. · You have trouble opening your mouth. · You have any trouble breathing. · You have much more trouble swallowing. · You have a fever with a stiff neck or a severe headache. · You are sensitive to light or feel very sleepy or confused. Watch closely for changes in your health, and be sure to contact your doctor if:  · You do not get better after 2 days. Where can you learn more? Go to http://www.gray.com/  Enter M655 in the search box to learn more about \"Tonsillitis: Care Instructions. \"  Current as of: July 29, 2019               Content Version: 12.5  © 0806-1225 Healthwise, [employer]. Care instructions adapted under license by [employer] (which disclaims liability or warranty for this information). If you have questions about a medical condition or this instruction, always ask your healthcare professional. [place] [address] any warranty or liability for your use of this information. Advance Care Planning  People with COVID-19 may have no symptoms, mild symptoms, such as fever, cough, and shortness of breath or they may have more severe illness, developing severe and fatal pneumonia.   As a result, Advance Care Planning with attention to naming a health care decision maker (someone you trust to make healthcare decisions for you if you could not speak for yourself) and sharing other health care preferences is important BEFORE a possible health crisis. Please contact your Primary Care Provider to discuss Advance Care Planning. Preventing the Spread of Coronavirus Disease 2019 in Homes and Residential Communities  For the most recent information go to [employer]    Prevention steps for People with confirmed or suspected COVID-19 (including persons under investigation) who do not need to be hospitalized  and   People with confirmed COVID-19 who were hospitalized and determined to be medically stable to go home    Your healthcare provider and public health staff will evaluate whether you can be cared for at home. If it is determined that you do not need to be hospitalized and can be isolated at home, you will be monitored by staff from your local or state health department. You should follow the prevention steps below until a healthcare provider or local or state health department says you can return to your normal activities. Stay home except to get medical care  People who are mildly ill with COVID-19 are able to isolate at home during their illness. You should restrict activities outside your home, except for getting medical care. Do not go to work, school, or public areas. Avoid using public transportation, ride-sharing, or taxis. Separate yourself from other people and animals in your home  People: As much as possible, you should stay in a specific room and away from other people in your home. Also, you should use a separate bathroom, if available. Animals: You should restrict contact with pets and other animals while you are sick with COVID-19, just like you would around other people.  Although there have not been reports of pets or other animals becoming sick with COVID-19, it is still recommended that people sick with COVID-19 limit contact with animals until more information is known about the virus. When possible, have another member of your household care for your animals while you are sick. If you are sick with COVID-19, avoid contact with your pet, including petting, snuggling, being kissed or licked, and sharing food. If you must care for your pet or be around animals while you are sick, wash your hands before and after you interact with pets and wear a facemask. Call ahead before visiting your doctor  If you have a medical appointment, call the healthcare provider and tell them that you have or may have COVID-19. This will help the healthcare providers office take steps to keep other people from getting infected or exposed. Wear a facemask  You should wear a facemask when you are around other people (e.g., sharing a room or vehicle) or pets and before you enter a healthcare providers office. If you are not able to wear a facemask (for example, because it causes trouble breathing), then people who live with you should not stay in the same room with you, or they should wear a facemask if they enter your room. Cover your coughs and sneezes  Cover your mouth and nose with a tissue when you cough or sneeze. Throw used tissues in a lined trash can. Immediately wash your hands with soap and water for at least 20 seconds or, if soap and water are not available, clean your hands with an alcohol-based hand  that contains at least 60% alcohol. Clean your hands often  Wash your hands often with soap and water for at least 20 seconds, especially after blowing your nose, coughing, or sneezing; going to the bathroom; and before eating or preparing food. If soap and water are not readily available, use an alcohol-based hand  with at least 60% alcohol, covering all surfaces of your hands and rubbing them together until they feel dry. Soap and water are the best option if hands are visibly dirty.  Avoid touching your eyes, nose, and mouth with unwashed hands. Avoid sharing personal household items  You should not share dishes, drinking glasses, cups, eating utensils, towels, or bedding with other people or pets in your home. After using these items, they should be washed thoroughly with soap and water. Clean all high-touch surfaces everyday  High touch surfaces include counters, tabletops, doorknobs, bathroom fixtures, toilets, phones, keyboards, tablets, and bedside tables. Also, clean any surfaces that may have blood, stool, or body fluids on them. Use a household cleaning spray or wipe, according to the label instructions. Labels contain instructions for safe and effective use of the cleaning product including precautions you should take when applying the product, such as wearing gloves and making sure you have good ventilation during use of the product. Monitor your symptoms  Seek prompt medical attention if your illness is worsening (e.g., difficulty breathing). Before seeking care, call your healthcare provider and tell them that you have, or are being evaluated for, COVID-19. Put on a facemask before you enter the facility. These steps will help the healthcare providers office to keep other people in the office or waiting room from getting infected or exposed. Ask your healthcare provider to call the local or state health department. Persons who are placed under active monitoring or facilitated self-monitoring should follow instructions provided by their local health department or occupational health professionals, as appropriate. When working with your local health department check their available hours. If you have a medical emergency and need to call 911, notify the dispatch personnel that you have, or are being evaluated for COVID-19. If possible, put on a facemask before emergency medical services arrive.   Discontinuing home isolation  Patients with confirmed COVID-19 should remain under home isolation precautions until the risk of secondary transmission to others is thought to be low. The decision to discontinue home isolation precautions should be made on a case-by-case basis, in consultation with healthcare providers and state and local health departments.

## 2020-08-28 NOTE — ACP (ADVANCE CARE PLANNING)
Does patient have an Advance Directive: not on file; education provided . Patient is interested in additional information regarding Advance Care Planning and completing an Advance Medical Directive. Referral to ACP Clinical Specialist ordered today.

## 2020-08-28 NOTE — TELEPHONE ENCOUNTER
CM receive call from patient states CM RN left  was returning call. Inform patient possibility nurse navigator called for COVID-19 protocol.     [address] [place] [address]  [phone]

## 2020-08-28 NOTE — ED NOTES
Pt presents to ED ambulatory complaining of sore throat was just recently treated for tonsillitis. Pt reports she has had a sore throat tender red and swollen since yesterday. Pt reports with those symptoms experiencing a dry cough since inflammation began and some back pain from coughing . Pt is alert and oriented x 4, RR even and unlabored, skin is warm and dry. Assessment completed and pt updated on plan of care. Call bell in reach. Emergency Department Nursing Plan of Care       The Nursing Plan of Care is developed from the Nursing assessment and Emergency Department Attending provider initial evaluation. The plan of care may be reviewed in the ED Provider note.     The Plan of Care was developed with the following considerations:   Patient / Family readiness to learn indicated by:verbalized understanding  Persons(s) to be included in education: patient  Barriers to Learning/Limitations:No    Signed     Tyson Cartagena RN    8/27/2020   8:30 PM

## 2020-08-28 NOTE — PROGRESS NOTES
Patient contacted regarding COVID-19  risk. Discussed COVID-19 related testing which was pending at this time. Test results were pending. Patient informed of results, if available? Result Pending. Care Transition Nurse/ Ambulatory Care Manager/ LPN Care Coordinator contacted the patient by telephone to perform post discharge assessment. Verified name and  with patient as identifiers. Provided introduction to self, and explanation of the CTN/ACM/LPN role, and reason for call due to risk factors for infection and/or exposure to COVID-19. Symptoms reviewed with patient who verbalized the following symptoms: fatigue, sweating, flank pain, no new symptoms and no worsening symptoms. Due to no new or worsening symptoms encounter was not routed to provider for escalation. Discussed follow-up appointments. If no appointment was previously scheduled, appointment scheduling offered: yes; pt would like to consult with \[facility]\""W regarding available resources prior to scheduling a new patient appointment because she does not currently have health insurance. Pt was advised to f/u with [facility] post-discharge to establish primary care. [facility] follow up appointment(s): No future appointments. Non-[facility] follow up appointment(s): N/A      Advance Care Planning:   Does patient have an Advance Directive: not on file; education provided . Patient is interested in additional information regarding Advance Care Planning and completing an Advance Medical Directive. Referral to ACP Clinical Specialist ordered today. Patient has following risk factors of: no known risk factors. CTN/ACM/LPN reviewed discharge instructions, medical action plan and red flags such as increased shortness of breath, increasing fever and signs of decompensation with patient who verbalized understanding. Discussed exposure protocols and quarantine with CDC Guidelines What to do if you are sick with coronavirus disease 2019.  Patient was given an opportunity for questions and concerns. The patient agrees to contact the [facility] exposure line [phone], local [facility] department [address]  ([phone]) and PCP office for questions related to their healthcare. CTN/ACM provided contact information for future needs. Reviewed and educated patient on any new and changed medications related to discharge diagnosis; Rx - augmentin, ibuprofen. Patient/family/caregiver given information for [employer] and agrees to enroll yes  Patient's preferred e-mail:  [email or web address]  Patient's preferred phone number: [phone]  Based on Loop alert triggers, patient will be contacted by nurse care manager for worsening symptoms. Pt reports that she is not currently followed by a PCP because she does not have health insurance. ACM educated patient on availability of [address] LCSW to assist with resources and patient is agreeable to outreach from [employer] for assistance and resources. Referral to LCSW Pool completed today. Pt will be further monitored by COVID Loop Team based on severity of symptoms and risk factors.

## 2020-08-30 LAB
BACTERIA SPEC CULT: ABNORMAL
BACTERIA SPEC CULT: ABNORMAL
SERVICE CMNT-IMP: ABNORMAL

## 2020-08-31 ENCOUNTER — PATIENT OUTREACH (OUTPATIENT)
Dept: CASE MANAGEMENT | Age: 35
End: 2020-08-31

## 2020-08-31 NOTE — PROGRESS NOTES
Social Work Note  2020    Referral received from Carlo Padilla RN for assistance with connection to insurance resources and PCP. Chart reviewed. Attempted to reach patient. Voicemail left on home number requesting return call. Received call from patient. Identify verified with name/. Explained purpose of call. Patient does not currently have insurance or PCP. Information provided:  · To apply for Medicaid:   [phone]   [facility].gov  · To apply for [employer]:  Www[employer], patient resources, financial assistance  · [address] [address] [facility]:  [phone], call between 6am and 9am to schedule virtual visit. Advised uninsured patients are seen  · Advised that some [employer] will see patients who have been approved for [employer]    No further questions at this time. Patient has SW/number for questions in the future.      KILEY Allen, ACSW, CCM    [facility] Management Team  [phone]

## 2020-09-14 ENCOUNTER — TELEPHONE (OUTPATIENT)
Dept: FAMILY MEDICINE CLINIC | Age: 35
End: 2020-09-14

## 2020-09-14 NOTE — ACP (ADVANCE CARE PLANNING)
Care Coordination CTN/ACM referred to ACP Specialist for Continued conversation for ACP decision-making / Goals of Care and Completion of Advance Directive    Outreach call to pt. Pt reported currently assisting daughter with virtual schooling. Requested ACP call back ~ 1 hr.

## 2020-09-14 NOTE — ACP (ADVANCE CARE PLANNING)
ACP Specialist attempted to call pt back as per earlier request. Message left informing ACP general information to be sent via e-mail address on file. ACP Specialist contact # provided. ACP Specialist follow up ~ 1 week. Mindi Stoll

## 2020-09-21 ENCOUNTER — TELEPHONE (OUTPATIENT)
Dept: FAMILY MEDICINE CLINIC | Age: 35
End: 2020-09-21

## 2020-09-21 NOTE — ACP (ADVANCE CARE PLANNING)
2nd ACP Specialist outreach attempt. Message left advising of 30 day time frame for open ACP referral.     APC Specialist contact # provided w/ return call request if interested in ACP. ACP Specialist f/u ~ 3 weeks. Close referral if no response.

## 2020-09-21 NOTE — ACP (ADVANCE CARE PLANNING)
Advance Care Planning     Advance Care Planning Clinical Specialist  Conversation Note      Date of ACP Conversation: 09/21/20    Conversation Conducted with:  Patient with Decision Making Capacity    ACP Clinical Specialist: Harmony Hong 46 Decision Maker:    Current Designated Health Care Decision Maker:   Primary Decision Maker: Valarie Dee  [phone]    Care Preferences    Hospitalization: \"If your health worsens and it becomes clear that your chance of recovery is unlikely, what would your preference be regarding hospitalization? \"    Choice:  [x]  The patient wants hospitalization - pt stated with exception of [facility]  []  The patient prefers comfort-focused treatment without hospitalization. Ventilation: \"If you were in your present state of health and suddenly became very ill and were unable to breathe on your own, what would your preference be about the use of a ventilator (breathing machine) if it were available to you? \"  Yes    \"If your health worsens and it becomes clear that your chance of recovery is unlikely, what would your preference be about the use of a ventilator (breathing machine) if it were available to you? \" Yes    Resuscitation  \"CPR works best to restart the heart when there is a sudden event, like a heart attack, in someone who is otherwise healthy. Unfortunately, CPR does not typically restart the heart for people who have serious health conditions or who are very sick. \"    \"In the event your heart stopped as a result of an underlying serious health condition, would you want attempts to be made to restart your heart (answer \"yes\" for attempt to resuscitate) or would you prefer a natural death (answer \"no\" for do not attempt to resuscitate)? Yes    [] Yes  [x] No   Educated Patient / Marie Killian regarding differences between Advance Directives and portable DNR orders.     Length of ACP Conversation in minutes:  15    Conversation Outcomes:  [x] ACP discussion completed  [] Existing advance directive reviewed with patient; no changes to patient's previously recorded wishes   [] New Advance Directive completed   [] Portable Do Not Resuscitate prepared for Provider review and signature  [] POLST/POST/MOLST/MOST prepared for Provider review and signature    Follow-up plan:    [x] Schedule follow-up conversation to continue planning-pt was advised to contact ACP Specialist if interested in completing AMD. If no pt response by 10/12/20 referral will be closed. Pt verbalized understanding.   [] Referred individual to Provider for additional questions/concerns   [] Advised patient/agent/surrogate to review completed ACP document and update if needed with changes in condition, patient preferences or care setting     [x] This note routed to one or more involved healthcare providers

## 2020-10-12 ENCOUNTER — TELEPHONE (OUTPATIENT)
Dept: FAMILY MEDICINE CLINIC | Age: 35
End: 2020-10-12

## 2020-10-12 NOTE — ACP (ADVANCE CARE PLANNING)
AMB ACP Specialist Referral Follow Up Note    No pt response to 9/21/20 message. 3rd outreach call today. Message left advising open time period for ACP referral ended. If interested in ACP in future discuss w/ PCP or contact ACP Educator @ [phone]. AMB ACP Specialist referral closed.

## 2023-05-22 RX ORDER — METHYLPREDNISOLONE 4 MG/1
TABLET ORAL
COMMUNITY
Start: 2019-12-30

## 2024-03-15 ENCOUNTER — HOSPITAL ENCOUNTER (EMERGENCY)
Facility: HOSPITAL | Age: 39
Discharge: HOME | End: 2024-03-15
Attending: EMERGENCY MEDICINE
Payer: MEDICAID

## 2024-03-15 VITALS
HEART RATE: 85 BPM | TEMPERATURE: 98.6 F | RESPIRATION RATE: 18 BRPM | WEIGHT: 145 LBS | DIASTOLIC BLOOD PRESSURE: 79 MMHG | OXYGEN SATURATION: 100 % | SYSTOLIC BLOOD PRESSURE: 115 MMHG

## 2024-03-15 DIAGNOSIS — J03.90 ACUTE TONSILLITIS, UNSPECIFIED ETIOLOGY: Primary | ICD-10-CM

## 2024-03-15 LAB — DEPRECATED S PYO AG THROAT QL EIA: NEGATIVE

## 2024-03-15 PROCEDURE — 6360000002 HC RX W HCPCS: Performed by: EMERGENCY MEDICINE

## 2024-03-15 PROCEDURE — 87070 CULTURE OTHR SPECIMN AEROBIC: CPT

## 2024-03-15 PROCEDURE — 99283 EMERGENCY DEPT VISIT LOW MDM: CPT

## 2024-03-15 PROCEDURE — 87880 STREP A ASSAY W/OPTIC: CPT

## 2024-03-15 PROCEDURE — 6370000000 HC RX 637 (ALT 250 FOR IP): Performed by: EMERGENCY MEDICINE

## 2024-03-15 RX ORDER — AMOXICILLIN 500 MG/1
500 CAPSULE ORAL 2 TIMES DAILY
Qty: 20 CAPSULE | Refills: 0 | Status: SHIPPED | OUTPATIENT
Start: 2024-03-15 | End: 2024-03-25

## 2024-03-15 RX ORDER — IBUPROFEN 600 MG/1
600 TABLET ORAL
Status: COMPLETED | OUTPATIENT
Start: 2024-03-15 | End: 2024-03-15

## 2024-03-15 RX ORDER — DEXAMETHASONE SODIUM PHOSPHATE 10 MG/ML
10 INJECTION, SOLUTION INTRAMUSCULAR; INTRAVENOUS ONCE
Status: COMPLETED | OUTPATIENT
Start: 2024-03-15 | End: 2024-03-15

## 2024-03-15 RX ADMIN — IBUPROFEN 600 MG: 600 TABLET, FILM COATED ORAL at 09:30

## 2024-03-15 RX ADMIN — DEXAMETHASONE SODIUM PHOSPHATE 10 MG: 10 INJECTION INTRAMUSCULAR; INTRAVENOUS at 09:30

## 2024-03-15 ASSESSMENT — PAIN - FUNCTIONAL ASSESSMENT: PAIN_FUNCTIONAL_ASSESSMENT: 0-10

## 2024-03-15 ASSESSMENT — PAIN SCALES - GENERAL: PAINLEVEL_OUTOF10: 6

## 2024-03-15 ASSESSMENT — PAIN DESCRIPTION - FREQUENCY: FREQUENCY: CONTINUOUS

## 2024-03-15 ASSESSMENT — PAIN DESCRIPTION - LOCATION: LOCATION: THROAT

## 2024-03-15 ASSESSMENT — PAIN DESCRIPTION - ORIENTATION: ORIENTATION: LEFT

## 2024-03-15 ASSESSMENT — PAIN DESCRIPTION - DESCRIPTORS: DESCRIPTORS: ACHING

## 2024-03-15 ASSESSMENT — PAIN DESCRIPTION - PAIN TYPE: TYPE: ACUTE PAIN

## 2024-03-15 NOTE — ED PROVIDER NOTES
diagnosis, and discharge instructions have been discussed, understanding conveyed, and agreed upon. The patient is to follow up as recommended or return to ER should their symptoms worsen.      PATIENT REFERRED TO:  [facility]  [address]  [address] [address] [phone]  Schedule an appointment as soon as possible for a visit   to establish care       DISCHARGE MEDICATIONS:     Medication List        START taking these medications      amoxicillin 500 MG capsule  Commonly known as: AMOXIL  Take 1 capsule by mouth 2 times daily for 10 days            ASK your doctor about these medications      methylPREDNISolone 4 MG tablet  Commonly known as: MEDROL DOSEPACK               Where to Get Your Medications        These medications were sent to [facility]/pharmacy #[id number] - [place], [place] - [address] [facility] - P [phone] - F [phone]  [address] [place] [address]      Phone: [phone]   amoxicillin 500 MG capsule           DISCONTINUED MEDICATIONS:  Current Discharge Medication List          I am the Primary Clinician of Record.   Ami lAvarez MD (electronically signed)    (Please note that parts of this dictation were completed with voice recognition software. Quite often unanticipated grammatical, syntax, homophones, and other interpretive errors are inadvertently transcribed by the computer software. Please disregards these errors. Please excuse any errors that have escaped final proofreading.)         Ami Alvarez MD  03/15/24 0987

## 2024-03-15 NOTE — ED NOTES
Patient (s) 1 given copy of dc instructions and 1 script(s). Patient (s)  verbalized understanding of instructions and script (s). Patient given a current medication reconciliation form and verbalized understanding of their medications. Patient (s) verbalized understanding of the importance of discussing medications with his or her physician or clinic they will be following up with. Patient alert and oriented and in no acute distress.

## 2024-03-15 NOTE — ED NOTES
Patient presents to ED with c/o sore throat since last Saturday. Patient states that she was briefly around her niece who was positive for strep.  Pt is alert and oriented x 4, RR even and unlabored, skin is warm and dry. Assesment completed and pt updated on plan of care.       Emergency Department Nursing Plan of Care       The Nursing Plan of Care is developed from the Nursing assessment and Emergency Department Attending provider initial evaluation.  The plan of care may be reviewed in the “ED Provider note”.    The Plan of Care was developed with the following considerations:   Patient / Family readiness to learn indicated by:verbalized understanding and successful return demonstration  Persons(s) to be included in education: patient  Barriers to Learning/Limitations:None    Signed     Jeanne Toure RN    3/15/2024   9:33 AM

## 2024-03-17 LAB
BACTERIA SPEC CULT: NORMAL
SERVICE CMNT-IMP: NORMAL

## 2024-05-02 ENCOUNTER — HOSPITAL ENCOUNTER (INPATIENT)
Facility: HOSPITAL | Age: 39
LOS: 4 days | Discharge: HOME | DRG: 751 | End: 2024-05-06
Attending: EMERGENCY MEDICINE | Admitting: PSYCHIATRY & NEUROLOGY
Payer: MEDICAID

## 2024-05-02 DIAGNOSIS — R45.851 SUICIDAL IDEATION: ICD-10-CM

## 2024-05-02 DIAGNOSIS — F19.10 POLYSUBSTANCE ABUSE (HCC): Primary | ICD-10-CM

## 2024-05-02 PROBLEM — F32.A DEPRESSION: Status: ACTIVE | Noted: 2024-05-02

## 2024-05-02 LAB
ALBUMIN SERPL-MCNC: 3.8 G/DL (ref 3.5–5)
ALBUMIN/GLOB SERPL: 0.9 (ref 1.1–2.2)
ALP SERPL-CCNC: 87 U/L (ref 45–117)
ALT SERPL-CCNC: 15 U/L (ref 12–78)
AMPHET UR QL SCN: NEGATIVE
ANION GAP SERPL CALC-SCNC: 16 MMOL/L (ref 5–15)
APPEARANCE UR: CLEAR
AST SERPL-CCNC: 14 U/L (ref 15–37)
BACTERIA URNS QL MICRO: NEGATIVE /HPF
BARBITURATES UR QL SCN: NEGATIVE
BASOPHILS # BLD: 0 K/UL (ref 0–0.1)
BASOPHILS NFR BLD: 1 % (ref 0–1)
BENZODIAZ UR QL: NEGATIVE
BILIRUB SERPL-MCNC: 0.3 MG/DL (ref 0.2–1)
BILIRUB UR QL: NEGATIVE
BUN SERPL-MCNC: 7 MG/DL (ref 6–20)
BUN/CREAT SERPL: 8 (ref 12–20)
CALCIUM SERPL-MCNC: 8.9 MG/DL (ref 8.5–10.1)
CANNABINOIDS UR QL SCN: NEGATIVE
CHLORIDE SERPL-SCNC: 107 MMOL/L (ref 97–108)
CO2 SERPL-SCNC: 21 MMOL/L (ref 21–32)
COCAINE UR QL SCN: POSITIVE
COLOR UR: ABNORMAL
CREAT SERPL-MCNC: 0.92 MG/DL (ref 0.55–1.02)
DIFFERENTIAL METHOD BLD: ABNORMAL
EOSINOPHIL # BLD: 0 K/UL (ref 0–0.4)
EOSINOPHIL NFR BLD: 0 % (ref 0–7)
EPITH CASTS URNS QL MICRO: ABNORMAL /LPF
ERYTHROCYTE [DISTWIDTH] IN BLOOD BY AUTOMATED COUNT: 16.1 % (ref 11.5–14.5)
ETHANOL SERPL-MCNC: 219 MG/DL (ref 0–0.08)
GLOBULIN SER CALC-MCNC: 4.1 G/DL (ref 2–4)
GLUCOSE SERPL-MCNC: 99 MG/DL (ref 65–100)
GLUCOSE UR STRIP.AUTO-MCNC: NEGATIVE MG/DL
HCG UR QL: NEGATIVE
HCT VFR BLD AUTO: 30.8 % (ref 35–47)
HGB BLD-MCNC: 9.1 G/DL (ref 11.5–16)
HGB UR QL STRIP: ABNORMAL
IMM GRANULOCYTES # BLD AUTO: 0 K/UL (ref 0–0.04)
IMM GRANULOCYTES NFR BLD AUTO: 0 % (ref 0–0.5)
KETONES UR QL STRIP.AUTO: ABNORMAL MG/DL
LEUKOCYTE ESTERASE UR QL STRIP.AUTO: NEGATIVE
LYMPHOCYTES # BLD: 1.1 K/UL (ref 0.8–3.5)
LYMPHOCYTES NFR BLD: 15 % (ref 12–49)
Lab: ABNORMAL
MCH RBC QN AUTO: 24.3 PG (ref 26–34)
MCHC RBC AUTO-ENTMCNC: 29.5 G/DL (ref 30–36.5)
MCV RBC AUTO: 82.4 FL (ref 80–99)
METHADONE UR QL: NEGATIVE
MONOCYTES # BLD: 0.5 K/UL (ref 0–1)
MONOCYTES NFR BLD: 7 % (ref 5–13)
NEUTS SEG # BLD: 5.8 K/UL (ref 1.8–8)
NEUTS SEG NFR BLD: 77 % (ref 32–75)
NITRITE UR QL STRIP.AUTO: NEGATIVE
NRBC # BLD: 0 K/UL (ref 0–0.01)
NRBC BLD-RTO: 0 PER 100 WBC
OPIATES UR QL: NEGATIVE
PCP UR QL: NEGATIVE
PH UR STRIP: 6.5 (ref 5–8)
PLATELET # BLD AUTO: 459 K/UL (ref 150–400)
PMV BLD AUTO: 9.3 FL (ref 8.9–12.9)
POTASSIUM SERPL-SCNC: 3.2 MMOL/L (ref 3.5–5.1)
PROT SERPL-MCNC: 7.9 G/DL (ref 6.4–8.2)
PROT UR STRIP-MCNC: NEGATIVE MG/DL
RBC # BLD AUTO: 3.74 M/UL (ref 3.8–5.2)
RBC #/AREA URNS HPF: ABNORMAL /HPF (ref 0–5)
SODIUM SERPL-SCNC: 144 MMOL/L (ref 136–145)
SP GR UR REFRACTOMETRY: 1.01 (ref 1–1.03)
UROBILINOGEN UR QL STRIP.AUTO: 0.2 EU/DL (ref 0.2–1)
WBC # BLD AUTO: 7.5 K/UL (ref 3.6–11)
WBC URNS QL MICRO: ABNORMAL /HPF (ref 0–4)

## 2024-05-02 PROCEDURE — 36415 COLL VENOUS BLD VENIPUNCTURE: CPT

## 2024-05-02 PROCEDURE — 90791 PSYCH DIAGNOSTIC EVALUATION: CPT

## 2024-05-02 PROCEDURE — 81025 URINE PREGNANCY TEST: CPT

## 2024-05-02 PROCEDURE — 99285 EMERGENCY DEPT VISIT HI MDM: CPT

## 2024-05-02 PROCEDURE — 6360000002 HC RX W HCPCS: Performed by: EMERGENCY MEDICINE

## 2024-05-02 PROCEDURE — 1240000000 HC EMOTIONAL WELLNESS R&B

## 2024-05-02 PROCEDURE — 80307 DRUG TEST PRSMV CHEM ANLYZR: CPT

## 2024-05-02 PROCEDURE — 6370000000 HC RX 637 (ALT 250 FOR IP): Performed by: EMERGENCY MEDICINE

## 2024-05-02 PROCEDURE — 2580000003 HC RX 258: Performed by: EMERGENCY MEDICINE

## 2024-05-02 PROCEDURE — 93005 ELECTROCARDIOGRAM TRACING: CPT | Performed by: EMERGENCY MEDICINE

## 2024-05-02 PROCEDURE — 85025 COMPLETE CBC W/AUTO DIFF WBC: CPT

## 2024-05-02 PROCEDURE — 81001 URINALYSIS AUTO W/SCOPE: CPT

## 2024-05-02 PROCEDURE — 6360000002 HC RX W HCPCS: Performed by: STUDENT IN AN ORGANIZED HEALTH CARE EDUCATION/TRAINING PROGRAM

## 2024-05-02 PROCEDURE — 82077 ASSAY SPEC XCP UR&BREATH IA: CPT

## 2024-05-02 PROCEDURE — 96375 TX/PRO/DX INJ NEW DRUG ADDON: CPT

## 2024-05-02 PROCEDURE — 96374 THER/PROPH/DIAG INJ IV PUSH: CPT

## 2024-05-02 PROCEDURE — 80053 COMPREHEN METABOLIC PANEL: CPT

## 2024-05-02 RX ORDER — POLYETHYLENE GLYCOL 3350 17 G/17G
17 POWDER, FOR SOLUTION ORAL DAILY PRN
Status: DISCONTINUED | OUTPATIENT
Start: 2024-05-02 | End: 2024-05-06 | Stop reason: HOSPADM

## 2024-05-02 RX ORDER — 0.9 % SODIUM CHLORIDE 0.9 %
1000 INTRAVENOUS SOLUTION INTRAVENOUS ONCE
Status: COMPLETED | OUTPATIENT
Start: 2024-05-02 | End: 2024-05-02

## 2024-05-02 RX ORDER — HALOPERIDOL 5 MG/ML
5 INJECTION INTRAMUSCULAR EVERY 4 HOURS PRN
Status: DISCONTINUED | OUTPATIENT
Start: 2024-05-02 | End: 2024-05-06 | Stop reason: HOSPADM

## 2024-05-02 RX ORDER — ACETAMINOPHEN 325 MG/1
650 TABLET ORAL EVERY 4 HOURS PRN
Status: DISCONTINUED | OUTPATIENT
Start: 2024-05-02 | End: 2024-05-06 | Stop reason: HOSPADM

## 2024-05-02 RX ORDER — HALOPERIDOL 5 MG/1
5 TABLET ORAL EVERY 4 HOURS PRN
Status: DISCONTINUED | OUTPATIENT
Start: 2024-05-02 | End: 2024-05-06 | Stop reason: HOSPADM

## 2024-05-02 RX ORDER — TRAZODONE HYDROCHLORIDE 50 MG/1
50 TABLET ORAL NIGHTLY PRN
Status: DISCONTINUED | OUTPATIENT
Start: 2024-05-02 | End: 2024-05-06 | Stop reason: HOSPADM

## 2024-05-02 RX ORDER — ONDANSETRON 4 MG/1
4 TABLET, ORALLY DISINTEGRATING ORAL EVERY 8 HOURS PRN
Status: DISCONTINUED | OUTPATIENT
Start: 2024-05-02 | End: 2024-05-06 | Stop reason: HOSPADM

## 2024-05-02 RX ORDER — ONDANSETRON 2 MG/ML
4 INJECTION INTRAMUSCULAR; INTRAVENOUS
Status: COMPLETED | OUTPATIENT
Start: 2024-05-02 | End: 2024-05-02

## 2024-05-02 RX ORDER — DIPHENHYDRAMINE HYDROCHLORIDE 50 MG/ML
50 INJECTION INTRAMUSCULAR; INTRAVENOUS EVERY 4 HOURS PRN
Status: DISCONTINUED | OUTPATIENT
Start: 2024-05-02 | End: 2024-05-06 | Stop reason: HOSPADM

## 2024-05-02 RX ORDER — MAGNESIUM HYDROXIDE/ALUMINUM HYDROXICE/SIMETHICONE 120; 1200; 1200 MG/30ML; MG/30ML; MG/30ML
30 SUSPENSION ORAL EVERY 6 HOURS PRN
Status: DISCONTINUED | OUTPATIENT
Start: 2024-05-02 | End: 2024-05-06 | Stop reason: HOSPADM

## 2024-05-02 RX ORDER — LORAZEPAM 2 MG/ML
1 INJECTION INTRAMUSCULAR ONCE
Status: COMPLETED | OUTPATIENT
Start: 2024-05-02 | End: 2024-05-02

## 2024-05-02 RX ORDER — POTASSIUM CHLORIDE 750 MG/1
40 TABLET, FILM COATED, EXTENDED RELEASE ORAL
Status: COMPLETED | OUTPATIENT
Start: 2024-05-02 | End: 2024-05-02

## 2024-05-02 RX ORDER — PHENOBARBITAL 32.4 MG/1
32.4 TABLET ORAL 2 TIMES DAILY
Status: DISCONTINUED | OUTPATIENT
Start: 2024-05-02 | End: 2024-05-03

## 2024-05-02 RX ORDER — HYDROXYZINE HYDROCHLORIDE 25 MG/1
50 TABLET, FILM COATED ORAL 3 TIMES DAILY PRN
Status: DISCONTINUED | OUTPATIENT
Start: 2024-05-02 | End: 2024-05-06 | Stop reason: HOSPADM

## 2024-05-02 RX ADMIN — LORAZEPAM 1 MG: 2 INJECTION INTRAMUSCULAR; INTRAVENOUS at 11:55

## 2024-05-02 RX ADMIN — POTASSIUM CHLORIDE 40 MEQ: 750 TABLET, EXTENDED RELEASE ORAL at 17:14

## 2024-05-02 RX ADMIN — ONDANSETRON 4 MG: 2 INJECTION INTRAMUSCULAR; INTRAVENOUS at 16:26

## 2024-05-02 RX ADMIN — SODIUM CHLORIDE 1000 ML: 9 INJECTION, SOLUTION INTRAVENOUS at 13:08

## 2024-05-02 RX ADMIN — SODIUM CHLORIDE 1000 ML: 9 INJECTION, SOLUTION INTRAVENOUS at 11:55

## 2024-05-02 ASSESSMENT — PAIN DESCRIPTION - DESCRIPTORS: DESCRIPTORS: ACHING;DISCOMFORT;SORE

## 2024-05-02 ASSESSMENT — PAIN DESCRIPTION - LOCATION: LOCATION: FLANK;BACK

## 2024-05-02 ASSESSMENT — SLEEP AND FATIGUE QUESTIONNAIRES
SLEEP PATTERN: DIFFICULTY FALLING ASLEEP
DO YOU USE A SLEEP AID: NO
DO YOU HAVE DIFFICULTY SLEEPING: YES
AVERAGE NUMBER OF SLEEP HOURS: 6

## 2024-05-02 ASSESSMENT — PAIN SCALES - GENERAL: PAINLEVEL_OUTOF10: 10

## 2024-05-02 ASSESSMENT — LIFESTYLE VARIABLES
HOW MANY STANDARD DRINKS CONTAINING ALCOHOL DO YOU HAVE ON A TYPICAL DAY: 5 OR 6
HOW OFTEN DO YOU HAVE A DRINK CONTAINING ALCOHOL: 4 OR MORE TIMES A WEEK

## 2024-05-02 ASSESSMENT — PAIN - FUNCTIONAL ASSESSMENT: PAIN_FUNCTIONAL_ASSESSMENT: PREVENTS OR INTERFERES SOME ACTIVE ACTIVITIES AND ADLS

## 2024-05-02 ASSESSMENT — PAIN DESCRIPTION - ORIENTATION: ORIENTATION: RIGHT;LOWER

## 2024-05-02 NOTE — ED NOTES
Pt's  notified this RN that he needed to leave to  daughters and left his phone number    [phone]     Pt still asleep and occasionally mumbles and groans to verbal stimuli.

## 2024-05-02 NOTE — ED TRIAGE NOTES
Pt arrives via EMS, distressed and in tears. Information per EMS, states that pt  called stating that pt has had life stressors that triggered suicidal ideation with plan of jumping of bridge. No auditory or visual hallucination. Pt states that she consumed alcohol, cocaine, and half a percocet pill.

## 2024-05-02 NOTE — ED NOTES
Bedside and Verbal shift change report given to GUI Bennett (oncoming nurse) by Sameera Sotelo RN (offgoing nurse). Report included the following information Nurse Handoff Report, Index, ED SBAR, Neuro Assessment, and Event Log.
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
## 2024-05-02 NOTE — ED PROVIDER NOTES
Allergies:  Allergies   Allergen Reactions    Adhesive Tape Rash    Cabbage Itching and Nausea And Vomiting       CURRENT MEDICATIONS      There are no discharge medications for this patient.      SCREENINGS               No data recorded         PHYSICAL EXAM      ED Triage Vitals   Enc Vitals Group      BP 05/02/24 1139 131/89      Pulse 05/02/24 1136 89      Respirations 05/02/24 1136 16      Temp 05/02/24 1136 97.1 °F (36.2 °C)      Temp Source 05/02/24 1136 Oral      SpO2 05/02/24 1136 100 %      Weight - Scale 05/02/24 1143 63.5 kg (140 lb)      Height 05/02/24 1143 1.626 m (5' 4\")      Head Circumference --       Peak Flow --       Pain Score --       Pain Loc --       Pain Edu? --       Excl. in GC? --        Physical Exam  Constitutional:       General: She is not in acute distress.     Appearance: She is not ill-appearing, toxic-appearing or diaphoretic.   Pulmonary:      Effort: Pulmonary effort is normal.   Musculoskeletal:         General: Normal range of motion.          DIAGNOSTIC RESULTS   LABS:    No results found for this or any previous visit (from the past 24 hour(s)).      EKG: If performed, independent interpretation documented below in the ED course or MDM section     RADIOLOGY:  Non-plain film images such as CT, Ultrasound and MRI are read by the radiologist. Plain radiographic images are visualized and preliminarily interpreted by the ED Provider with the findings documented in the MDM section.     Interpretation per the Radiologist below, if available at the time of this note:     No orders to display          PROCEDURES   Unless otherwise noted below, none  Procedures       EMERGENCY DEPARTMENT COURSE and DIFFERENTIAL DIAGNOSIS/MDM   Vitals:    Vitals:    05/03/24 2000 05/03/24 2145 05/04/24 0900 05/04/24 0908   BP: (!) 107/90  113/68 113/68   Pulse: 81 81 81 81   Resp:  16 14    Temp:  98 °F (36.7 °C) 98.1 °F (36.7 °C)    TempSrc:  Oral Oral    SpO2:   100%    Weight:       Height:
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
## 2024-05-02 NOTE — ED NOTES
Pt has calmed down, sitter at bedside. Pts oxygen saturation began to drop, and lynn again once awoken. Pt put on cardiac monitor. EKG completed. Pt changed into paper scrub and belongings secured and wanded by security.

## 2024-05-03 PROBLEM — F10.20 ALCOHOL USE DISORDER, SEVERE, DEPENDENCE (HCC): Status: ACTIVE | Noted: 2024-05-03

## 2024-05-03 PROBLEM — F32.1 CURRENT MODERATE EPISODE OF MAJOR DEPRESSIVE DISORDER (HCC): Status: ACTIVE | Noted: 2024-05-03

## 2024-05-03 PROBLEM — F32.A DEPRESSION: Status: RESOLVED | Noted: 2024-05-02 | Resolved: 2024-05-03

## 2024-05-03 PROBLEM — F14.10 COCAINE USE DISORDER, MILD, ABUSE (HCC): Status: ACTIVE | Noted: 2024-05-03

## 2024-05-03 LAB
EKG ATRIAL RATE: 76 BPM
EKG DIAGNOSIS: NORMAL
EKG P AXIS: 43 DEGREES
EKG P-R INTERVAL: 152 MS
EKG Q-T INTERVAL: 410 MS
EKG QRS DURATION: 94 MS
EKG QTC CALCULATION (BAZETT): 461 MS
EKG R AXIS: 68 DEGREES
EKG T AXIS: 59 DEGREES
EKG VENTRICULAR RATE: 76 BPM

## 2024-05-03 PROCEDURE — 99223 1ST HOSP IP/OBS HIGH 75: CPT | Performed by: PSYCHIATRY & NEUROLOGY

## 2024-05-03 PROCEDURE — 1240000000 HC EMOTIONAL WELLNESS R&B

## 2024-05-03 PROCEDURE — 6370000000 HC RX 637 (ALT 250 FOR IP)

## 2024-05-03 PROCEDURE — 93010 ELECTROCARDIOGRAM REPORT: CPT | Performed by: SPECIALIST

## 2024-05-03 PROCEDURE — 6370000000 HC RX 637 (ALT 250 FOR IP): Performed by: PSYCHIATRY & NEUROLOGY

## 2024-05-03 RX ORDER — PHENOBARBITAL 32.4 MG/1
32.4 TABLET ORAL 4 TIMES DAILY
Status: COMPLETED | OUTPATIENT
Start: 2024-05-04 | End: 2024-05-05

## 2024-05-03 RX ORDER — PHENOBARBITAL 32.4 MG/1
16.2 TABLET ORAL EVERY 6 HOURS PRN
Status: DISCONTINUED | OUTPATIENT
Start: 2024-05-06 | End: 2024-05-06 | Stop reason: HOSPADM

## 2024-05-03 RX ORDER — TOPIRAMATE 25 MG/1
25 TABLET ORAL 2 TIMES DAILY
Status: DISCONTINUED | OUTPATIENT
Start: 2024-05-03 | End: 2024-05-05

## 2024-05-03 RX ORDER — PHENOBARBITAL 32.4 MG/1
32.4 TABLET ORAL EVERY 6 HOURS PRN
Status: ACTIVE | OUTPATIENT
Start: 2024-05-04 | End: 2024-05-06

## 2024-05-03 RX ORDER — BUPROPION HYDROCHLORIDE 150 MG/1
150 TABLET ORAL DAILY
Status: DISCONTINUED | OUTPATIENT
Start: 2024-05-03 | End: 2024-05-06 | Stop reason: HOSPADM

## 2024-05-03 RX ORDER — LANOLIN ALCOHOL/MO/W.PET/CERES
100 CREAM (GRAM) TOPICAL DAILY
Status: DISCONTINUED | OUTPATIENT
Start: 2024-05-03 | End: 2024-05-06 | Stop reason: HOSPADM

## 2024-05-03 RX ORDER — FOLIC ACID 1 MG/1
1 TABLET ORAL DAILY
Status: DISCONTINUED | OUTPATIENT
Start: 2024-05-03 | End: 2024-05-06 | Stop reason: HOSPADM

## 2024-05-03 RX ORDER — MULTIVITAMIN WITH IRON
1 TABLET ORAL DAILY
Status: DISCONTINUED | OUTPATIENT
Start: 2024-05-03 | End: 2024-05-06 | Stop reason: HOSPADM

## 2024-05-03 RX ORDER — PHENOBARBITAL 32.4 MG/1
64.8 TABLET ORAL EVERY 6 HOURS PRN
Status: ACTIVE | OUTPATIENT
Start: 2024-05-03 | End: 2024-05-04

## 2024-05-03 RX ORDER — PHENOBARBITAL 32.4 MG/1
64.8 TABLET ORAL 4 TIMES DAILY
Status: COMPLETED | OUTPATIENT
Start: 2024-05-03 | End: 2024-05-04

## 2024-05-03 RX ORDER — PHENOBARBITAL 32.4 MG/1
32.4 TABLET ORAL 2 TIMES DAILY
Status: COMPLETED | OUTPATIENT
Start: 2024-05-05 | End: 2024-05-06

## 2024-05-03 RX ORDER — PHENOBARBITAL 32.4 MG/1
16.2 TABLET ORAL 2 TIMES DAILY
Status: DISCONTINUED | OUTPATIENT
Start: 2024-05-06 | End: 2024-05-06 | Stop reason: HOSPADM

## 2024-05-03 RX ADMIN — FOLIC ACID 1 MG: 1 TABLET ORAL at 12:46

## 2024-05-03 RX ADMIN — PHENOBARBITAL 64.8 MG: 32.4 TABLET ORAL at 20:30

## 2024-05-03 RX ADMIN — PHENOBARBITAL 32.4 MG: 32.4 TABLET ORAL at 09:09

## 2024-05-03 RX ADMIN — PHENOBARBITAL 64.8 MG: 32.4 TABLET ORAL at 17:08

## 2024-05-03 RX ADMIN — THERA TABS 1 TABLET: TAB at 12:46

## 2024-05-03 RX ADMIN — TOPIRAMATE 25 MG: 25 TABLET, FILM COATED ORAL at 12:46

## 2024-05-03 RX ADMIN — PHENOBARBITAL 64.8 MG: 32.4 TABLET ORAL at 12:46

## 2024-05-03 RX ADMIN — TOPIRAMATE 25 MG: 25 TABLET, FILM COATED ORAL at 20:31

## 2024-05-03 RX ADMIN — Medication 100 MG: at 12:46

## 2024-05-03 RX ADMIN — BUPROPION HYDROCHLORIDE 150 MG: 150 TABLET, FILM COATED, EXTENDED RELEASE ORAL at 12:46

## 2024-05-03 ASSESSMENT — PAIN SCALES - GENERAL: PAINLEVEL_OUTOF10: 0

## 2024-05-03 NOTE — GROUP NOTE
Group Therapy Note    Date: 5/3/2024    Group Start Time: 1400  Group End Time: 1500  Group Topic: Recreational    [facility] ACUTE [facility]    Deidre Wilcox        Group Therapy Note    Attendees: 7       Patient's Goal:  To concentrate on selected task    Notes:  Pt did not attend session    Discipline Responsible: Recreational Therapist      Signature:  DEIDRE WILCOX

## 2024-05-03 NOTE — H&P
Knowledge of medications, and Awareness of Substance abuse issues                                        SIGNED:    Garrison Pierce MD  5/3/2024

## 2024-05-03 NOTE — PROGRESS NOTES
Auto-MST trigger per RN admission assessment.  No acute nutrition or weight issues identified.  Pt meal compliant.  Trigger was pt is unsure of any recent unintentional weight loss.  Supplements ordered.  Hx unremarkable per nutrition.  Ht: 5'4.02\"  Wt: 1440 lb  BMI: 24.02 kg/(m^2) c/w normal weight  Est energy needs: 1825 kcal, 65 g protein, 1 mL/kcal fluids  Pt will consume > 75% of meals at follow up 7-10 days  LOS, MST

## 2024-05-03 NOTE — ED NOTES
TRANSFER - OUT REPORT:    Verbal report given to Julia DESOUZA RN on Sohail Gary  being transferred to [facility] 320 for routine progression of patient care       Report consisted of patient's Situation, Background, Assessment and   Recommendations(SBAR).     Information from the following report(s) Nurse Handoff Report, ED Encounter Summary, ED SBAR, MAR, and Recent Results was reviewed with the receiving nurse.    Newborn Fall Assessment:    Presents to emergency department  because of falls (Syncope, seizure, or loss of consciousness): No  Age > 70: No  Altered Mental Status, Intoxication with alcohol or substance confusion (Disorientation, impaired judgment, poor safety awaremess, or inability to follow instructions): Yes  Impaired Mobility: Ambulates or transfers with assistive devices or assistance; Unable to ambulate or transer.: No  Nursing Judgement: Yes          Lines:       Opportunity for questions and clarification was provided.      Patient transported with:  security

## 2024-05-03 NOTE — CARE COORDINATION
05/03/24 0905   ITP   Date of Plan 05/03/24   Primary Diagnosis Code Depression   Barriers to Treatment Need for psychoeducation;Psychiatric symptom (comment)  (Depression)   Strengths Incorporated in Plan Acknowledging need for assistance;Family supports;Seeking interactions   Plan of Care   Long Term Goal (LTG) Stated in patient/guardian terms Client will maintain mental health in the community   Short Term Goal 1   Short Term Goal 1 Client will learn and demonstrate effective coping skills   Baseline Functioning Client is using substances   Target Client will develop coping skills that do not involve substances   Objectives Client will participate in group therapy   Intervention 1 Acknowledge client strengths;Milieu therapy and support;Group therapy;Referral to community services;Family involvement in treatment plan   Frequency Daily   Measured by Staff observation;Self report   Staff Responsible Clinical staff;[facility] staff   STG Goal 1 Status: Patient Appears to be  Treatment plan goal is unmet at this time   Short Term Goal 2   Short Term Goal 2 Client will remain safe during stay   Baseline Functioning Client having thoughts of wanting to harm herself   Target Client will not act on thoughts of wanting to hurt   Objectives Client will participate in group therapy   Intervention 1 Acknowledge client strengths;Monitor medications;Family involvement in treatment plan;Milieu therapy and support   Frequency Daily   Measured by Self report;Staff observation   Staff Responsible Clinical staff;[facility] staff   STG Goal 2 Status: Patient Appears to be  Progressing toward treatment plan goal   Crisis/Safety/Discharge Plan   Crisis/Safety Plan Standard program interventions and protocol   Comprehensive Assessment Completion Date 05/03/24   Discharge Plan Home

## 2024-05-03 NOTE — GROUP NOTE
Group Therapy Note    Date: 5/3/2024    Group Start Time: 1000  Group End Time: 1100  Group Topic: Topic Group    [facility] 3 ACUTE BEH[facility]    Deidre Wilcox        Group Therapy Note    Attendees: 7       Patient's Goal:  To participate in relaxation activity    Notes:  Pt did not attend session      Discipline Responsible: Recreational Therapist      Signature:  DEIDRE WILCOX

## 2024-05-03 NOTE — PROGRESS NOTES
[facility]: MED RECONCILIATION    Comments/Recommendations:   Source: chart review, RX query refill history  Per refill history, patient is not currently taking any medications. Patient last filled fluoxetine 10 mg 10/2023 per essie Sunshine.       Mary Ann Carvalho, [facility]  Contact: [phone]

## 2024-05-03 NOTE — PROGRESS NOTES
[facility]  Admission Note     Admission Type:   Admission Type: Voluntary    Reason for admission:  Reason for Admission: Suicide attempt with a plan to jump off a bridge    PATIENT STRENGTHS:  Patient ability to communicate needs, independent with ADL's       Patient Limitations: Patient inability to cope with stressors          Addictive Behavior:   Substance abuse to cocaine, percocet, alcohol abuse.       Medical Problems:   Past Medical History:   Diagnosis Date    Other ill-defined conditions(109.89)     HSV       Status EXAM:  Mental Status and Behavioral Exam  Normal: No  Level of Assistance: Independent/Self  Facial Expression: Sad  Affect: Constricted  Level of Consciousness: Alert  Frequency of Checks: 1 staff: 1 patient  - continuous  Mood:Normal: No  Mood: Depressed, Anxious, Sad  Motor Activity:Normal: Yes  Eye Contact: Poor  Observed Behavior: Cooperative, Tearful  Sexual Misconduct History: Current - no  Preception: [place] to person, [place] to time, [place] to place, [place] to situation  Attention:Normal: Yes  Thought Processes: Unremarkable  Thought Content:Normal: Yes  Depression Symptoms: Crying, Feelings of hopelessess  Anxiety Symptoms: Generalized  Sandra Symptoms: No problems reported or observed.  Hallucinations: None  Delusions: No  Memory:Normal: Yes  Insight and Judgment: No  Insight and Judgment: Poor judgment, Poor insight    Pt admitted with followings belongings:  Dental Appliances: None  Vision - Corrective Lenses: None  Hearing Aid: None  Jewelry: Body Piercing, Bracelet, Earrings, Necklace, Ring  Body Piercings Removed: No  Clothing: Undergarments, Footwear (black body suit)  Other Valuables: Other (Comment)     Valuables placed in safe in security envelope, number: [id number].  Patient refused to be oriented to surroundings, due to her being in pain and wanted to lay down and rest. Received admission packet: Patient education on precautions: suicide and

## 2024-05-03 NOTE — CARE COORDINATION
05/03/24 0904   Suicidal Ideation   Wish to be Dead Lifetime - Yes;Past 1 month - Yes   Non-Specific Active Suicidal Thoughts Lifetime - Yes;Past 1 month - Yes   Suicidal Behavior Trigger Wish to be Dead   Active Suicidal Ideation with Any Methods (Not Plan) without Intent to Act Lifetime - Yes;Past 1 month - Yes   Active Suicidal Ideation with Some Intent to Act, without Specific Plan Past 1 month - Yes;Lifetime - Yes   Active Suicidal Ideation with Specific Plan and Intent Lifetime - Yes;Past 1 month - Yes

## 2024-05-04 PROCEDURE — 99232 SBSQ HOSP IP/OBS MODERATE 35: CPT | Performed by: PSYCHIATRY & NEUROLOGY

## 2024-05-04 PROCEDURE — 6370000000 HC RX 637 (ALT 250 FOR IP): Performed by: PSYCHIATRY & NEUROLOGY

## 2024-05-04 PROCEDURE — 1240000000 HC EMOTIONAL WELLNESS R&B

## 2024-05-04 RX ADMIN — BUPROPION HYDROCHLORIDE 150 MG: 150 TABLET, FILM COATED, EXTENDED RELEASE ORAL at 09:09

## 2024-05-04 RX ADMIN — THERA TABS 1 TABLET: TAB at 09:09

## 2024-05-04 RX ADMIN — Medication 100 MG: at 09:10

## 2024-05-04 RX ADMIN — FOLIC ACID 1 MG: 1 TABLET ORAL at 09:10

## 2024-05-04 RX ADMIN — TOPIRAMATE 25 MG: 25 TABLET, FILM COATED ORAL at 09:10

## 2024-05-04 RX ADMIN — PHENOBARBITAL 64.8 MG: 32.4 TABLET ORAL at 09:10

## 2024-05-04 RX ADMIN — PHENOBARBITAL 32.4 MG: 32.4 TABLET ORAL at 21:42

## 2024-05-04 RX ADMIN — TOPIRAMATE 25 MG: 25 TABLET, FILM COATED ORAL at 21:42

## 2024-05-04 RX ADMIN — PHENOBARBITAL 32.4 MG: 32.4 TABLET ORAL at 16:50

## 2024-05-04 RX ADMIN — PHENOBARBITAL 32.4 MG: 32.4 TABLET ORAL at 13:27

## 2024-05-05 PROCEDURE — 99232 SBSQ HOSP IP/OBS MODERATE 35: CPT | Performed by: PSYCHIATRY & NEUROLOGY

## 2024-05-05 PROCEDURE — 6370000000 HC RX 637 (ALT 250 FOR IP): Performed by: PSYCHIATRY & NEUROLOGY

## 2024-05-05 PROCEDURE — 1240000000 HC EMOTIONAL WELLNESS R&B

## 2024-05-05 RX ORDER — TOPIRAMATE 25 MG/1
50 TABLET ORAL 2 TIMES DAILY
Status: DISCONTINUED | OUTPATIENT
Start: 2024-05-05 | End: 2024-05-06 | Stop reason: HOSPADM

## 2024-05-05 RX ADMIN — BUPROPION HYDROCHLORIDE 150 MG: 150 TABLET, FILM COATED, EXTENDED RELEASE ORAL at 09:34

## 2024-05-05 RX ADMIN — TOPIRAMATE 25 MG: 25 TABLET, FILM COATED ORAL at 09:34

## 2024-05-05 RX ADMIN — TOPIRAMATE 50 MG: 25 TABLET, FILM COATED ORAL at 21:51

## 2024-05-05 RX ADMIN — THERA TABS 1 TABLET: TAB at 09:34

## 2024-05-05 RX ADMIN — FOLIC ACID 1 MG: 1 TABLET ORAL at 09:34

## 2024-05-05 RX ADMIN — PHENOBARBITAL 32.4 MG: 32.4 TABLET ORAL at 21:52

## 2024-05-05 RX ADMIN — PHENOBARBITAL 32.4 MG: 32.4 TABLET ORAL at 09:35

## 2024-05-05 RX ADMIN — Medication 100 MG: at 09:34

## 2024-05-05 ASSESSMENT — PAIN SCALES - GENERAL
PAINLEVEL_OUTOF10: 0
PAINLEVEL_OUTOF10: 0

## 2024-05-06 VITALS
WEIGHT: 140 LBS | HEART RATE: 76 BPM | RESPIRATION RATE: 16 BRPM | TEMPERATURE: 98.5 F | SYSTOLIC BLOOD PRESSURE: 105 MMHG | DIASTOLIC BLOOD PRESSURE: 66 MMHG | OXYGEN SATURATION: 100 % | HEIGHT: 64 IN | BODY MASS INDEX: 23.9 KG/M2

## 2024-05-06 PROCEDURE — 99239 HOSP IP/OBS DSCHRG MGMT >30: CPT | Performed by: PSYCHIATRY & NEUROLOGY

## 2024-05-06 PROCEDURE — 6370000000 HC RX 637 (ALT 250 FOR IP): Performed by: PSYCHIATRY & NEUROLOGY

## 2024-05-06 RX ORDER — DISULFIRAM 250 MG/1
250 TABLET ORAL DAILY
Qty: 30 TABLET | Refills: 1 | Status: SHIPPED | OUTPATIENT
Start: 2024-05-06

## 2024-05-06 RX ORDER — BUPROPION HYDROCHLORIDE 150 MG/1
150 TABLET ORAL DAILY
Qty: 30 TABLET | Refills: 1 | Status: SHIPPED | OUTPATIENT
Start: 2024-05-07

## 2024-05-06 RX ORDER — TOPIRAMATE 50 MG/1
50 TABLET, FILM COATED ORAL 2 TIMES DAILY
Qty: 60 TABLET | Refills: 1 | Status: SHIPPED | OUTPATIENT
Start: 2024-05-06

## 2024-05-06 RX ADMIN — FOLIC ACID 1 MG: 1 TABLET ORAL at 08:37

## 2024-05-06 RX ADMIN — THERA TABS 1 TABLET: TAB at 08:37

## 2024-05-06 RX ADMIN — BUPROPION HYDROCHLORIDE 150 MG: 150 TABLET, FILM COATED, EXTENDED RELEASE ORAL at 08:37

## 2024-05-06 RX ADMIN — PHENOBARBITAL 32.4 MG: 32.4 TABLET ORAL at 08:37

## 2024-05-06 RX ADMIN — Medication 100 MG: at 08:37

## 2024-05-06 RX ADMIN — TOPIRAMATE 50 MG: 25 TABLET, FILM COATED ORAL at 08:37

## 2024-05-06 NOTE — DISCHARGE SUMMARY
appointments as well as to comply with psychiatric medications as prescribed.          DISCHARGE MEDICATIONS:    Informed consent given for the use of following psychotropic medications:     Medication List        START taking these medications      buPROPion 150 MG extended release tablet  Commonly known as: WELLBUTRIN XL  Take 1 tablet by mouth daily  Start taking on: May 7, 2024     disulfiram 250 MG tablet  Commonly known as: Antabuse  Take 1 tablet by mouth daily     topiramate 50 MG tablet  Commonly known as: TOPAMAX  Take 1 tablet by mouth 2 times daily            STOP taking these medications      methylPREDNISolone 4 MG tablet  Commonly known as: MEDROL DOSEPACK               Where to Get Your Medications        These medications were sent to [facility]/pharmacy #[id number] - [place], [place] - [facility] [facility] [phone] - F [phone]  [address] [place] [id number]      Phone: [phone]   buPROPion 150 MG extended release tablet  disulfiram 250 MG tablet  topiramate 50 MG tablet                A coordinated, multidisplinary treatment team round was conducted with Sohail Gary---this is done daily here at [facility]. This team consists of the nurse, psychiatric unit pharmacist,  and writer.     I have spent greater than 35 minutes on discharge work.    Signed:  Garrison Pierce MD  5/6/2024

## 2024-05-06 NOTE — BH NOTE
E/M Psychiatric Progress Note    Patient: Sohail Gary MRN: [id number]  SSN: [id number]    YOB: 1985  Age: 38 y.o.  Sex: female      Admit Date: 5/2/2024    LOS: 3 days     Chief Complaint: suicidal ideation / detox    Subjective:     HPI / INTERVAL HISTORY:    The patient, Sohail Gary, is a 38 y.o.  Black /  female with a past psychiatric history significant for EtOH and cocaine use disorder, who presents at this time with complaints of (and/or evidence of) the following emotional symptoms: depression and suicidal thoughts/threats.  Additional symptomatology include relapse on alcohol.  The above symptoms have been present for 2+ weeks. These symptoms are of moderate to high severity. These symptoms are constant in nature.  The patient's condition has been precipitated by psychosocial stressors.  Patient's condition made worse by continued psychoactive drug use and alcohol use as well as treatment noncompliance. UDS: +cocaine; BAL=219 on admission.     The patient presented to the ED with suicidal ideation; she states that she attempted to overdose on alcohol. She reports every day drinking. She drinks during her lunch periods. She reports drinking daily for over a year. She is presently in therapy but this has not been effective in controlling her drinking. She also reports that she has been using cocaine daily for several months. She reports that she has told her  about her substance abuse.      The patient is a fair historian. The patient corroborates the above narrative. The patient contracts for safety on the unit and gives consent for the team to contact collateral. The patient is amenable to initiating treatment while on the unit.    05/04 - the patient has been anxious, depressed and isolative. She slept 8 hours overnight and got no PRNs. The patient appears to be tolerating the medication taper. She is medication compliant and has been able to make 
    E/M Psychiatric Progress Note    Patient: Sohail Gary MRN: [id number]  SSN: [id number]    YOB: 1985  Age: 38 y.o.  Sex: female      Admit Date: 5/2/2024    LOS: 3 days     Chief Complaint: suicidal ideation / detox    Subjective:     HPI / INTERVAL HISTORY:    The patient, Sohail Gary, is a 38 y.o.  Black /  female with a past psychiatric history significant for EtOH and cocaine use disorder, who presents at this time with complaints of (and/or evidence of) the following emotional symptoms: depression and suicidal thoughts/threats.  Additional symptomatology include relapse on alcohol.  The above symptoms have been present for 2+ weeks. These symptoms are of moderate to high severity. These symptoms are constant in nature.  The patient's condition has been precipitated by psychosocial stressors.  Patient's condition made worse by continued psychoactive drug use and alcohol use as well as treatment noncompliance. UDS: +cocaine; BAL=219 on admission.     The patient presented to the ED with suicidal ideation; she states that she attempted to overdose on alcohol. She reports every day drinking. She drinks during her lunch periods. She reports drinking daily for over a year. She is presently in therapy but this has not been effective in controlling her drinking. She also reports that she has been using cocaine daily for several months. She reports that she has told her  about her substance abuse.      The patient is a fair historian. The patient corroborates the above narrative. The patient contracts for safety on the unit and gives consent for the team to contact collateral. The patient is amenable to initiating treatment while on the unit.    05/04 - the patient has been anxious, depressed and isolative. She slept 8 hours overnight and got no PRNs. The patient appears to be tolerating the medication taper. She is medication compliant and has been able to make 
  [facility] that pt does not require 1:1 for U admission to unit.        
Date: 5/3/2024     Group Start Time: 0900  Group End Time: 1000  Group Topic: Psychoeducation-Emotional and Behavioral thought processing   Number of Participants: 6/12     Patient's Goal/Task: Facilitator led group on thinking errors that involved self-blaming, feelings as facts, \"should\" statements, mind processing, negative labeling, ignoring the good, and setting the bar of expectations (realistic vs. Unrealistic expectations).  Facilitator provided a hand out with examples of each and provided an open group discussion setting.  Facilitator provided effective coping skills, calming techniques and motivational group setting.      Notes on participation:  PATIENT DID NOT ATTEND GROUP      Narcisa GOODRICH Student Intern  
Date: 5/4/2024     Group Start Time: 1000  Group End Time: 1100  Group Topic: Psychoeducation-Emotional and Behavioral thought processing with a focus on forgiveness.   Number of Participants: 5/11     Patient's Goal/Task: Facilitator led group on emotional processing and temperature scale reading.  Facilitator provided education on importance of effective communication skills with a focus on forgiveness.  Facilitator provided effective coping skills, calming techniques and motivational group setting.      Notes on participation:  PATIENT DID NOT ATTEND GROUP      Narcisa Lafleur   
Order recvd from Provider to discontinue 1:1 constant .   
Patient contracted for safety and was able to sign safety contract. Sw updated MD and patient will no longer need to be on 1:1      KP Dickerson  
Patient given positive [facility] letter for review. Patient confirmed licensure. Explanation provided that [facility] will be recieving notification of admission. Patient verbalized understanding. Copy of letter placed in chart and escalated to administration for notification.       KP Dickerson  
Patient given positive [facility] letter for review. Patient confirmed licensure. Explanation provided that [facility] will be recieving notification of admission. Patient verbalized understanding. Copy of letter placed in chart and escalated to administration for notification.     KP Crespo Student  
Patient is isolative, alert and oriented. Affect is flat and mood is depressed and sad. Patient interacted well, eye contact is fair, endorsed depression of 3/10, denied anxiety, SI, HI, and AVH. She is meds and meals compliant. Calm and cooperative. No aggressive behavior noted. Emotional support and encouragement provided.  Q 15 minutes and hourly rounds in progress. Pt slept for the total of 8 hours.   
Pt encountered in her bed A/O X4. Reported having depression at 3.Medication compliant.Slept for 8 hrs. CIWA score 0.  Safety checks on progress.    
Pt is calm and cooperative. Alert. Isolative to self. Accepting meals and medications. Pt denies si/hia/v verma. Anxiety and depression. Pt denies withdrawal. Pt has an anticipated discharge in the morning. Plan of care ongoing.   
Sohail is alert and verbal. Discharged to home or self care to continue recommended plan of care. Discharge instructions reviewed with the patient. She verbalized understanding. Patient belongings returned. She was escorted to her transportation.  
Sohail is alert, calm, cooperative, isolative, flat affect. She denies all SI/HI/AVH/pain/anxiety. She endorsed depression at 3/10. She is medication and meal compliant. No distress observed. No concerns expressed at this time. Q 15 minute checks ongoing to ensure patient safety.  
This writer spoke with patient's  Reinier [phone]. He reports that the kids are safe and that he will be able to remove the guns from the home. He reports that he does not have any questions as this time.      KP Dickerson  
Writer met patient resting in her room. One to one observation still in progress. Patient is isolative, alert and oriented. Affect is flat and mood is depressed and sad. Patient endorsed anxiety of 9/10, denied depression, SI, HI, and AVH. She is meds and meals compliant. Calm and cooperative. No aggressive behavior noted. Emotional support and encouragement provided.  Q 15 minutes and hourly rounds in progress. Pt slept for the total of 8 hours.  
Writer met with patient in her room. Patient was resting quietly in bed and appeared  calm/free from distress. Patient was cooperative with assessment. Patient is Aox4. Patient presents with flat affect and anxious mood/depressed mood. Patient endorses mild depression and anxiety but does not request prn medication at this time. Patient denies SI, HI, and AVH. Patient is noted to have poor judgement. Patient makes poor eye contact throughout interaction. Patient remains isolative to her room. Patient is noted to have a CIWA score of 3 (see flowsheets). Patient is medication and meal compliant. Q15 minute safety checks maintained.   
tobacco: Never   Substance Use Topics    Alcohol use: Yes       History of biomedical complications associated with substance abuse: not reported    Patient's current acceptance of treatment or motivation for change: fair    Family constellation: pt is  with 2 FCI children and one non FCI children     Is significant other involved? 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    
tablet  topiramate 50 MG tablet           To obtain results of studies pending at discharge, please contact medical records [phone] option 4.    Follow-up Information       Follow up With Specialties Details Why Contact Info    [facility]  Call today Call your therapist today to schedule a hospital follow up appointment. [address] [address]    [phone]    nikkie London    Crisis Resources  Follow up  National Suicide Prevention Lifeline:  24/7 hotline, 6 (076) 081-5964  Warm Line:  Monday-Friday 9 AM-9 PM, Saturday-Sunday 5 PM- 9 PM  7 (833) 423-7887  Crisis Text Line:  24/7 crisis text messaging.  Text HOME to 109419.  Substance Abuse [facility]...    [facility]  Call As needed Main Location    [address]    [address]    [place] [address]    Get Directions    [facility] Location    [address] [facility] [address]    Get Directions    [facility] Office    [address] [facility] [address]            Advanced Directive:   Does the patient have an appointed surrogate decision maker? No  Does the patient have a Medical Advance Directive? No  Does the patient have a Psychiatric Advance Directive? No  If the patient does not have a surrogate or Medical Advance Directive AND Psychiatric Advance Directive, the patient was offered information on these advance directives No    Patient Instructions: Please continue all medications until otherwise directed by physician.      Tobacco Cessation Discharge Plan:   Is the patient a smoker and needs referral for smoking cessation? No  Patient referred to the following for smoking cessation with an appointment? No    Patient was offered medication to assist with smoking cessation at discharge? No  Was education for smoking cessation added to the discharge instructions? No    Alcohol/Substance Abuse Discharge Plan:   Does the patient have a history of substance/alcohol abuse and requires a

## 2024-05-06 NOTE — DISCHARGE INSTRUCTIONS
DISCHARGE SUMMARY    NAME:Sohail Gary  : 1985  MRN: [id number]    The patient Sohail Gary exhibits the ability to control behavior in a less restrictive environment.  Patient's level of functioning is improving.  No assaultive/destructive behavior has been observed for the past 24 hours.  No suicidal/homicidal threat or behavior has been observed for the past 24 hours.  There is no evidence of serious medication side effects.  Patient has not been in physical or protective restraints for at least the past 24 hours.    If weapons involved, how are they secured? Guns have been removed     Is patient aware of and in agreement with discharge plan? Yes    Arrangements for medication:  Prescriptions sent to pharmacy     Copy of discharge instructions to provider?:  Yes    Arrangements for transportation home:  [employer]    Keep all follow up appointments as scheduled, continue to take prescribed medications per physician instructions.  Mental health crisis number:  911 or your local mental health crisis line number at [phone]      Mental Health Emergency WARM LINE      [phone]-MHAV 6428)      M-F: 9am to 9pm      Sat & Sun: 5pm - 9pm  National suicide prevention lines:                             9-288-OQRSWKQ (7-266-741-8782)       9-216-278-TALK ([phone])    Crisis Text Line:  Text HOME to 947628

## 2024-05-06 NOTE — GROUP NOTE
Group Therapy Note    Date: 5/6/2024    Group Start Time: 1000  Group End Time: 1100  Group Topic: Topic Group    [facility] 3 ACUTE BEHAV [facility]    Deider Wilcox        Group Therapy Note    Attendees: 7       Patient's Goal:  To participate in relaxation activity    Notes:  Pt did not attend session    Discipline Responsible: Recreational Therapist      Signature:  DEIDRE WILCOX

## 2024-05-06 NOTE — PLAN OF CARE
Problem: Coping  Goal: Pt/Family able to verbalize concerns and demonstrate effective coping strategies  Description: INTERVENTIONS:  1. Assist patient/family to identify coping skills, available support systems and cultural and spiritual values  2. Provide emotional support, including active listening and acknowledgement of concerns of patient and caregivers  3. Reduce environmental stimuli, as able  4. Instruct patient/family in relaxation techniques, as appropriate  5. Assess for spiritual pain/suffering and initiate Spiritual Care, Psychosocial Clinical Specialist consults as needed  5/6/2024 0809 by Radha Youssef RN  Outcome: Progressing     
  Problem: Anxiety  Goal: Will report anxiety at manageable levels  Description: INTERVENTIONS:  1. Administer medication as ordered  2. Teach and rehearse alternative coping skills  3. Provide emotional support with 1:1 interaction with staff  5/4/2024 2224 by Nila Macias RN  Outcome: Not Progressing  5/4/2024 1635 by Winter Salomon RN  Outcome: Progressing     Problem: Coping  Goal: Pt/Family able to verbalize concerns and demonstrate effective coping strategies  Description: INTERVENTIONS:  1. Assist patient/family to identify coping skills, available support systems and cultural and spiritual values  2. Provide emotional support, including active listening and acknowledgement of concerns of patient and caregivers  3. Reduce environmental stimuli, as able  4. Instruct patient/family in relaxation techniques, as appropriate  5. Assess for spiritual pain/suffering and initiate Spiritual Care, Psychosocial Clinical Specialist consults as needed  5/4/2024 2224 by Nila Macias RN  Outcome: Not Progressing  5/4/2024 1635 by Winter Salomon RN  Outcome: Progressing     
  Problem: Anxiety  Goal: Will report anxiety at manageable levels  Description: INTERVENTIONS:  1. Administer medication as ordered  2. Teach and rehearse alternative coping skills  3. Provide emotional support with 1:1 interaction with staff  5/5/2024 0954 by Candice Rubin RN  Outcome: Progressing  5/4/2024 2224 by Nila Macias RN  Outcome: Not Progressing     Problem: Coping  Goal: Pt/Family able to verbalize concerns and demonstrate effective coping strategies  Description: INTERVENTIONS:  1. Assist patient/family to identify coping skills, available support systems and cultural and spiritual values  2. Provide emotional support, including active listening and acknowledgement of concerns of patient and caregivers  3. Reduce environmental stimuli, as able  4. Instruct patient/family in relaxation techniques, as appropriate  5. Assess for spiritual pain/suffering and initiate Spiritual Care, Psychosocial Clinical Specialist consults as needed  5/5/2024 0954 by Candice Rubin RN  Outcome: Progressing  5/4/2024 2224 by Nila Macias RN  Outcome: Not Progressing     
  Problem: Anxiety  Goal: Will report anxiety at manageable levels  Description: INTERVENTIONS:  1. Administer medication as ordered  2. Teach and rehearse alternative coping skills  3. Provide emotional support with 1:1 interaction with staff  Outcome: Not Progressing  Flowsheets (Taken 5/3/2024 1523 by Shahab Del Castillo, RN)  Will report anxiety at manageable levels: Administer medication as ordered     Problem: Depression  Goal: Will be euthymic at discharge  Description: INTERVENTIONS:  1. Administer medication as ordered  2. Provide emotional support via 1:1 interaction with staff  3. Encourage involvement in milieu/groups/activities  4. Monitor for social isolation  Outcome: Not Progressing     
  Problem: Discharge Planning  Goal: Discharge to home or other facility with appropriate resources  Outcome: Progressing     Problem: Behavior  Goal: Pt/Family maintain appropriate behavior and adhere to behavioral management agreement, if implemented  Description: INTERVENTIONS:  1. Assess patient/family's coping skills and  non-compliant behavior (including use of illegal substances)  2. Notify security of behavior or suspected illegal substances which indicate the need for search of the family and/or belongings  3. Encourage verbalization of thoughts and concerns in a socially appropriate manner  4. Utilize positive, consistent limit setting strategies supporting safety of patient, staff and others  5. Encourage participation in the decision making process about the behavioral management agreement  6. If a visitor's behavior poses a threat to safety call refer to organization policy.  7. Initiate consult with , Psychosocial CNS, Spiritual Care as appropriate  Outcome: Progressing     Problem: Depression/Self Harm  Goal: Effect of psychiatric condition will be minimized and patient will be protected from self harm  Description: INTERVENTIONS:  1. Assess impact of patient's symptoms on level of functioning, self care needs and offer support as indicated  2. Assess patient/family knowledge of depression, impact on illness and need for teaching  3. Provide emotional support, presence and reassurance  4. Assess for possible suicidal thoughts or ideation. If patient expresses suicidal thoughts or statements do not leave alone, initiate Suicide Precautions, move to a room close to the nursing station and obtain sitter  5. Initiate consults as appropriate with Mental Health Professional, Spiritual Care, Psychosocial CNS, and consider a recommendation to the LIP for a Psychiatric Consultation  Outcome: Progressing     Problem: Self Harm/Suicidality  Goal: Will have no self-injury during hospital 
  Problem: Discharge Planning  Goal: Discharge to home or other facility with appropriate resources  Outcome: Progressing     Problem: Risk for Elopement  Goal: Patient will not exit the unit/facility without proper excort  Outcome: Progressing     Problem: Pain  Goal: Verbalizes/displays adequate comfort level or baseline comfort level  Outcome: Progressing     Problem: Anxiety  Goal: Will report anxiety at manageable levels  Description: INTERVENTIONS:  1. Administer medication as ordered  2. Teach and rehearse alternative coping skills  3. Provide emotional support with 1:1 interaction with staff  Outcome: Progressing     Problem: Coping  Goal: Pt/Family able to verbalize concerns and demonstrate effective coping strategies  Description: INTERVENTIONS:  1. Assist patient/family to identify coping skills, available support systems and cultural and spiritual values  2. Provide emotional support, including active listening and acknowledgement of concerns of patient and caregivers  3. Reduce environmental stimuli, as able  4. Instruct patient/family in relaxation techniques, as appropriate  5. Assess for spiritual pain/suffering and initiate Spiritual Care, Psychosocial Clinical Specialist consults as needed  Outcome: Progressing     Problem: Decision Making  Goal: Pt/Family able to effectively weigh alternatives and participate in decision making related to treatment and care  Description: INTERVENTIONS:  1. Determine when there are differences between patient's view, family's view, and healthcare provider's view of condition  2. Facilitate patient and family articulation of goals for care  3. Help patient and family identify pros/cons of alternative solutions  4. Provide information as requested by patient/family  5. Respect patient/family right to receive or not to receive information  6. Serve as a liaison between patient and family and health care team  7. Initiate Consults from Ethics, Palliative Care or 
  Problem: Risk for Elopement  Goal: Patient will not exit the unit/facility without proper excort  Outcome: Progressing     Problem: Anxiety  Goal: Will report anxiety at manageable levels  Description: INTERVENTIONS:  1. Administer medication as ordered  2. Teach and rehearse alternative coping skills  3. Provide emotional support with 1:1 interaction with staff  Outcome: Progressing     Problem: Coping  Goal: Pt/Family able to verbalize concerns and demonstrate effective coping strategies  Description: INTERVENTIONS:  1. Assist patient/family to identify coping skills, available support systems and cultural and spiritual values  2. Provide emotional support, including active listening and acknowledgement of concerns of patient and caregivers  3. Reduce environmental stimuli, as able  4. Instruct patient/family in relaxation techniques, as appropriate  5. Assess for spiritual pain/suffering and initiate Spiritual Care, Psychosocial Clinical Specialist consults as needed  Outcome: Progressing     Problem: Decision Making  Goal: Pt/Family able to effectively weigh alternatives and participate in decision making related to treatment and care  Description: INTERVENTIONS:  1. Determine when there are differences between patient's view, family's view, and healthcare provider's view of condition  2. Facilitate patient and family articulation of goals for care  3. Help patient and family identify pros/cons of alternative solutions  4. Provide information as requested by patient/family  5. Respect patient/family right to receive or not to receive information  6. Serve as a liaison between patient and family and health care team  7. Initiate Consults from Ethics, Palliative Care or initiate Family Care Conference as is appropriate  Outcome: Progressing     Problem: Depression/Self Harm  Goal: Effect of psychiatric condition will be minimized and patient will be protected from self harm  Description: INTERVENTIONS:  1. 
0730 At this time morning assessment was done. Patient was encountered lying in bed, she was calm and cooperative with assessment, but making poor eye contact. Patient denied HI and A/V hallucinations, she endorsed SI with no plan as well as anxiety and depression. C-SSRS level is no risk. Sohail was compliant with routine medications, there are no untoward side effects from medications to note.  Patient is on 1:1 to assure patient safety and attend to care needs. Will continue to monitor.        Problem: Discharge Planning  Goal: Discharge to home or other facility with appropriate resources  5/3/2024 0131 by Julia Martinez, RN  Outcome: Progressing     Problem: Risk for Elopement  Goal: Patient will not exit the unit/facility without proper excort  5/3/2024 0131 by Julia Martinez, RN  Outcome: Progressing  Flowsheets (Taken 5/2/2024 1143 by Deepak Pretty, RN)  Nursing Interventions for Elopement Risk:   Collaborate with family members/caregivers to mitigate the elopement risk   Communicate to physician the risk for elopement     Problem: Coping  Goal: Pt/Family able to verbalize concerns and demonstrate effective coping strategies  Description: INTERVENTIONS:  1. Assist patient/family to identify coping skills, available support systems and cultural and spiritual values  2. Provide emotional support, including active listening and acknowledgement of concerns of patient and caregivers  3. Reduce environmental stimuli, as able  4. Instruct patient/family in relaxation techniques, as appropriate  5. Assess for spiritual pain/suffering and initiate Spiritual Care, Psychosocial Clinical Specialist consults as needed  Outcome: Progressing     Problem: Behavior  Goal: Pt/Family maintain appropriate behavior and adhere to behavioral management agreement, if implemented  Description: INTERVENTIONS:  1. Assess patient/family's coping skills and  non-compliant behavior (including use of illegal substances)  2. Notify 
Spiritual Care, Psychosocial CNS, and consider a recommendation to the LIP for a Psychiatric Consultation  5/6/2024 1154 by Radha Youssef RN  Outcome: Adequate for Discharge     Problem: Abuse/Neglect  Goal: Pt/Caregiver aware of resources to assist with issues of abuse and neglect  Description: INTERVENTIONS:  1. Assess for level of risk and safety  2. Initiate referral to Social Work and notify Licensed Independent Practictioner (LIP)  3. Provide appropriate education and resources to patient and/or family  4. Initiate referral to Adult Protective Services, as appropriate  5. Initiate referral to Child Protective Services, as appropriate  6. Offer to have the patient's the patient's chart marked as Non-disclosed/Privacy patient for phone inquiries, as appropriate  7. Provide emotional support, including active listening and acknowledgment of concerns  5/6/2024 1154 by Radha Youssef RN  Outcome: Adequate for Discharge       Problem: Drug Abuse/Detox  Goal: Will have no detox symptoms and will verbalize plan for changing drug-related behavior  Description: INTERVENTIONS:  1. Administer medication as ordered  2. Monitor physical status  3. Provide emotional support with 1:1 interaction with staff  4. Encourage  recovery focused treatment   5/6/2024 1154 by Radha Youssef RN  Outcome: Adequate for Discharge     Problem: Safety - Adult  Goal: Free from fall injury  5/6/2024 1154 by Radha Youssef RN  Outcome: Adequate for Discharge     Problem: Self Harm/Suicidality  Goal: Will have no self-injury during hospital stay  Description: INTERVENTIONS:  1.  Ensure constant observer at bedside with Q15M safety checks  2.  Maintain a safe environment  3.  Secure patient belongings  4.  Ensure family/visitors adhere to safety recommendations  5.  Ensure safety tray has been added to patient's diet order  6.  Every shift and PRN: Re-assess suicidal risk via Frequent Screener    5/6/2024 1154 by Radha Youssef

## 2024-05-31 ENCOUNTER — HOSPITAL ENCOUNTER (EMERGENCY)
Facility: HOSPITAL | Age: 39
Discharge: HOME | End: 2024-05-31
Attending: STUDENT IN AN ORGANIZED HEALTH CARE EDUCATION/TRAINING PROGRAM
Payer: MEDICAID

## 2024-05-31 VITALS
SYSTOLIC BLOOD PRESSURE: 113 MMHG | HEIGHT: 60 IN | OXYGEN SATURATION: 100 % | DIASTOLIC BLOOD PRESSURE: 99 MMHG | BODY MASS INDEX: 26.46 KG/M2 | WEIGHT: 134.8 LBS | RESPIRATION RATE: 16 BRPM | HEART RATE: 80 BPM | TEMPERATURE: 98.1 F

## 2024-05-31 DIAGNOSIS — R42 ORTHOSTATIC LIGHTHEADEDNESS: ICD-10-CM

## 2024-05-31 DIAGNOSIS — D64.9 ANEMIA, UNSPECIFIED TYPE: Primary | ICD-10-CM

## 2024-05-31 DIAGNOSIS — F19.10 POLYSUBSTANCE ABUSE (HCC): ICD-10-CM

## 2024-05-31 LAB
ALBUMIN SERPL-MCNC: 3.8 G/DL (ref 3.5–5)
ALBUMIN/GLOB SERPL: 0.9 (ref 1.1–2.2)
ALP SERPL-CCNC: 90 U/L (ref 45–117)
ALT SERPL-CCNC: 12 U/L (ref 12–78)
ANION GAP SERPL CALC-SCNC: 10 MMOL/L (ref 5–15)
APPEARANCE UR: ABNORMAL
AST SERPL-CCNC: 10 U/L (ref 15–37)
BACTERIA URNS QL MICRO: NEGATIVE /HPF
BASOPHILS # BLD: 0 K/UL (ref 0–0.1)
BASOPHILS NFR BLD: 1 % (ref 0–1)
BILIRUB SERPL-MCNC: 0.2 MG/DL (ref 0.2–1)
BILIRUB UR QL: NEGATIVE
BUN SERPL-MCNC: 11 MG/DL (ref 6–20)
BUN/CREAT SERPL: 11 (ref 12–20)
CALCIUM SERPL-MCNC: 8.9 MG/DL (ref 8.5–10.1)
CHLORIDE SERPL-SCNC: 105 MMOL/L (ref 97–108)
CK SERPL-CCNC: 65 U/L (ref 26–192)
CO2 SERPL-SCNC: 24 MMOL/L (ref 21–32)
COLOR UR: ABNORMAL
CREAT SERPL-MCNC: 0.98 MG/DL (ref 0.55–1.02)
DIFFERENTIAL METHOD BLD: ABNORMAL
EKG ATRIAL RATE: 66 BPM
EKG DIAGNOSIS: NORMAL
EKG P AXIS: 61 DEGREES
EKG P-R INTERVAL: 144 MS
EKG Q-T INTERVAL: 386 MS
EKG QRS DURATION: 88 MS
EKG QTC CALCULATION (BAZETT): 404 MS
EKG R AXIS: 73 DEGREES
EKG T AXIS: 69 DEGREES
EKG VENTRICULAR RATE: 66 BPM
EOSINOPHIL # BLD: 0.1 K/UL (ref 0–0.4)
EOSINOPHIL NFR BLD: 2 % (ref 0–7)
EPITH CASTS URNS QL MICRO: ABNORMAL /LPF
ERYTHROCYTE [DISTWIDTH] IN BLOOD BY AUTOMATED COUNT: 16.1 % (ref 11.5–14.5)
GLOBULIN SER CALC-MCNC: 4.2 G/DL (ref 2–4)
GLUCOSE SERPL-MCNC: 85 MG/DL (ref 65–100)
GLUCOSE UR STRIP.AUTO-MCNC: NEGATIVE MG/DL
HCG UR QL: NEGATIVE
HCT VFR BLD AUTO: 31.7 % (ref 35–47)
HGB BLD-MCNC: 9.7 G/DL (ref 11.5–16)
HGB UR QL STRIP: ABNORMAL
IMM GRANULOCYTES # BLD AUTO: 0 K/UL (ref 0–0.04)
IMM GRANULOCYTES NFR BLD AUTO: 0 % (ref 0–0.5)
KETONES UR QL STRIP.AUTO: NEGATIVE MG/DL
LEUKOCYTE ESTERASE UR QL STRIP.AUTO: NEGATIVE
LYMPHOCYTES # BLD: 0.9 K/UL (ref 0.8–3.5)
LYMPHOCYTES NFR BLD: 25 % (ref 12–49)
MAGNESIUM SERPL-MCNC: 1.8 MG/DL (ref 1.6–2.4)
MCH RBC QN AUTO: 25.1 PG (ref 26–34)
MCHC RBC AUTO-ENTMCNC: 30.6 G/DL (ref 30–36.5)
MCV RBC AUTO: 82.1 FL (ref 80–99)
MONOCYTES # BLD: 0.3 K/UL (ref 0–1)
MONOCYTES NFR BLD: 9 % (ref 5–13)
NEUTS SEG # BLD: 2.4 K/UL (ref 1.8–8)
NEUTS SEG NFR BLD: 63 % (ref 32–75)
NITRITE UR QL STRIP.AUTO: NEGATIVE
NRBC # BLD: 0 K/UL (ref 0–0.01)
NRBC BLD-RTO: 0 PER 100 WBC
PH UR STRIP: 5.5 (ref 5–8)
PLATELET # BLD AUTO: 444 K/UL (ref 150–400)
PMV BLD AUTO: 9.4 FL (ref 8.9–12.9)
POTASSIUM SERPL-SCNC: 3.7 MMOL/L (ref 3.5–5.1)
PROT SERPL-MCNC: 8 G/DL (ref 6.4–8.2)
PROT UR STRIP-MCNC: NEGATIVE MG/DL
RBC # BLD AUTO: 3.86 M/UL (ref 3.8–5.2)
RBC #/AREA URNS HPF: ABNORMAL /HPF (ref 0–5)
SODIUM SERPL-SCNC: 139 MMOL/L (ref 136–145)
SP GR UR REFRACTOMETRY: 1.02
URINE CULTURE IF INDICATED: ABNORMAL
UROBILINOGEN UR QL STRIP.AUTO: 0.2 EU/DL (ref 0.2–1)
WBC # BLD AUTO: 3.8 K/UL (ref 3.6–11)
WBC URNS QL MICRO: ABNORMAL /HPF (ref 0–4)

## 2024-05-31 PROCEDURE — 36415 COLL VENOUS BLD VENIPUNCTURE: CPT

## 2024-05-31 PROCEDURE — 82550 ASSAY OF CK (CPK): CPT

## 2024-05-31 PROCEDURE — 81025 URINE PREGNANCY TEST: CPT

## 2024-05-31 PROCEDURE — 99284 EMERGENCY DEPT VISIT MOD MDM: CPT

## 2024-05-31 PROCEDURE — 85025 COMPLETE CBC W/AUTO DIFF WBC: CPT

## 2024-05-31 PROCEDURE — 96360 HYDRATION IV INFUSION INIT: CPT

## 2024-05-31 PROCEDURE — 93005 ELECTROCARDIOGRAM TRACING: CPT | Performed by: STUDENT IN AN ORGANIZED HEALTH CARE EDUCATION/TRAINING PROGRAM

## 2024-05-31 PROCEDURE — 80053 COMPREHEN METABOLIC PANEL: CPT

## 2024-05-31 PROCEDURE — 2580000003 HC RX 258: Performed by: STUDENT IN AN ORGANIZED HEALTH CARE EDUCATION/TRAINING PROGRAM

## 2024-05-31 PROCEDURE — 93010 ELECTROCARDIOGRAM REPORT: CPT | Performed by: SPECIALIST

## 2024-05-31 PROCEDURE — 81001 URINALYSIS AUTO W/SCOPE: CPT

## 2024-05-31 PROCEDURE — 83735 ASSAY OF MAGNESIUM: CPT

## 2024-05-31 RX ORDER — FERROUS SULFATE 325(65) MG
325 TABLET ORAL
Qty: 30 TABLET | Refills: 0 | Status: SHIPPED | OUTPATIENT
Start: 2024-05-31 | End: 2024-06-30

## 2024-05-31 RX ORDER — 0.9 % SODIUM CHLORIDE 0.9 %
1000 INTRAVENOUS SOLUTION INTRAVENOUS ONCE
Status: COMPLETED | OUTPATIENT
Start: 2024-05-31 | End: 2024-05-31

## 2024-05-31 RX ADMIN — SODIUM CHLORIDE 1000 ML: 9 INJECTION, SOLUTION INTRAVENOUS at 08:20

## 2024-05-31 ASSESSMENT — PAIN - FUNCTIONAL ASSESSMENT: PAIN_FUNCTIONAL_ASSESSMENT: 0-10

## 2024-05-31 ASSESSMENT — PAIN SCALES - GENERAL: PAINLEVEL_OUTOF10: 2

## 2024-05-31 ASSESSMENT — PAIN DESCRIPTION - LOCATION: LOCATION: LEG

## 2024-05-31 ASSESSMENT — PAIN DESCRIPTION - DESCRIPTORS: DESCRIPTORS: OTHER (COMMENT)

## 2024-05-31 ASSESSMENT — PAIN DESCRIPTION - ORIENTATION: ORIENTATION: RIGHT;LEFT

## 2024-05-31 NOTE — DISCHARGE INSTRUCTIONS
have been affected bysomeone alcohol abuse  Open meeting everyone can attend.    Alcoholic's Anonymous [phone]  Non professional (alcoholics in recovery helping others)  Hold Meetings (talk about sobriety, have desire)  No charge    [facility] [phone]  Randal Ortiz is the Treatment Consultant in the [facility]  Free one-on-one phone consultation and insurance verification  12 step based meetings and philosophy  Family programs and sessions    [facility] [phone]  Free individual assessment  Intensive outpatient outpatient program  Ambulatory withdrawal management/detoxification  Insurance required    [facility]  [phone] ([place] location), [phone] ([place] location)  An Office Based Opioid Treatment Program  Individual and Group therapy, Peer Recovery Services and Care Coordination  Accepting Medicare, Medicaid and Commercial/private insurance  $200 a month self-pay program (does not include medicine or outside labs)  [address], [address], [facility] [address] (Monday - Friday, 9a-5p.  Standing [facility] ED referral appointment 10:00-11:00 Monday)  [address] , [address], [place] [address] (Tuesday - Friday, 9a-5p Standing [facility] ED referral appointment 10:00-11:00 Tuesday - Friday)    [facility] [phone]  Good for patients with no insurance, Medicaid, Medicare  Sliding fee scale available for self pay  Patients go Monday-Friday from 8-4:30 to central intake for a shelter and then to [facility] for services  Offers a variety of services I.e. Laundry, shower, eye clinic, medical clinic, dental, substance abuse services, case management, etc.    [facility] [phone]  Make appointment with counselor  $60 fee for initial hour intake    Family [facility] [phone]    [facility] [phone]  Offers Detox and Inpatient Treatment for men only. Social detox  Is a homeless shelter with longterm 12 step  breath test and 10 panel Urine Drug Screen  No Sex Offenders or Psychiatric patients  Must not have been a 3x repeat at this facility  6 month in house- has to participate in work therapy 40 hours/week  Participates in spiritual classes, bible study and Restorationism    [facility] [phone]    [facility] [phone]  Private Pay, sent by Community Services Board  No Sex Offenders    Community Services Boards (by Region)    [facility]  [phone]  Monday through Friday 8:30am to 5:00pm  Brief Telephone Interview. Then will be scheduled for next substance abuse services orientation group and then scheduled for intake interview.    [facility]  [phone]  Walk in Monday through Friday 9:00AM to 3:00PM  Bring Picture ID, Proof of residence (piece of mail), Proof of Insurance (Medicaid/sliding scale), Proof of income (any)    [facility] [phone]  Walk in then Assessment by licensed professional   [place] office ([facility]) Monday, Tuesday, Thursday  9AM to 3PM.   [place] office ([address] [place] [address], [address]) Monday-Thursday 9AM - 3PM.     [facility]  [phone]  Walk In Monday to Friday starting at 8AM  Bring Picture ID, Proof of residence (piece of mail), Proof of insurance (Medicaid/sliding scale)  At 9AM is the Substance Abuse Services Orientation Group and then scheduled for Intake Evaluation.

## 2024-05-31 NOTE — ED TRIAGE NOTES
Pt presents with dizziness upon standing, weakness, and fatigue. Pt endorses a suicide attempt on 5/2/2024 where she ingested alcohol, cocaine, and percocet, and endorses generalized SI with no plan today. Pt denies HI and A/V hallucinations.

## 2024-05-31 NOTE — ED NOTES
Pt presents to ED complaining of dizziness upon standing, weakness, and fatigue. Pt states that she has been taking bupropion for mental health, and that she believes her electrolytes or iron are low due to fatigue and weakness. Pt also endorses SI with no plan, but has had an attempt on 5/2/2024 where she ingested alcohol, cocaine, and percocet. Pt denies HI and A/V hallucinations. Pt endorses IN cocaine use last night and nausea with no V/D. Pt is alert and oriented x 4, RR even and unlabored, skin is warm and dry. Pt appears in NAD at this time. Assessment completed and pt updated on plan of care.  Call bell in reach.   Emergency Department Nursing Plan of Care  The Nursing Plan of Care is developed from the Nursing assessment and Emergency Department Attending provider initial evaluation.  The plan of care may be reviewed in the “ED Provider note”.  The Plan of Care was developed with the following considerations:  Patient / Family readiness to learn indicated by:Refer to Medical chart in [facility]  Persons(s) to be included in education: Refer to Medical chart in [facility]  Barriers to Learning/Limitations:Normal

## 2024-05-31 NOTE — ED NOTES
Discharge instructions were given to the patient by Benjamin Sherman RN  .  The patient left the Emergency Department alert and oriented and in no acute distress with 1 prescription(s). The patient was encouraged to call or return to the ED for worsening issues or problems and was encouraged to schedule a follow up appointment for continuing care.  The patient verbalized understanding of discharge instructions and prescriptions; all questions were answered. The patient has no further concerns at this time.

## 2024-05-31 NOTE — ED PROVIDER NOTES
[facility] EMERGENCY DEPT  EMERGENCY DEPARTMENT ENCOUNTER       Pt Name: Sohail Gary  MRN: [id number]  Birthdate 1985  Date of evaluation: 2024  Provider: Jt Zaman MD   PCP: No primary care provider on file.  Note Started: 9:14 AM 24     CHIEF COMPLAINT       Chief Complaint   Patient presents with    Dizziness    Fatigue        HISTORY OF PRESENT ILLNESS: 1 or more elements      History From: Patient  None     Sohail Gary is a 38 y.o. female who presents to the emergency department with orthostatic lightheadedness, generalized fatigue, dyspnea on exertion.  Patient states she was previously told she had \"low iron\" when she had similar symptoms.  Denies any recent bleeding, bloody stools, heavy periods.  Patient also reports some generalized muscle soreness/stiffness.  Patient reports her mood has been depressed recently but she denies any suicidal ideation at this time.  Denies HI or [facility]     Nursing Notes were all reviewed and agreed with or any disagreements were addressed in the HPI.     REVIEW OF SYSTEMS      Review of Systems     Positives and Pertinent negatives as per HPI.    PAST HISTORY     Past Medical History:  Past Medical History:   Diagnosis Date    Other ill-defined conditions(799.89)     HSV         Past Surgical History:  Past Surgical History:   Procedure Laterality Date     SECTION         Family History:  History reviewed. No pertinent family history.    Social History:  Social History     Tobacco Use    Smoking status: Never    Smokeless tobacco: Never   Substance Use Topics    Alcohol use: Yes    Drug use: Yes     Types: Cocaine, Marijuana (Weed)     Comment: cocaine, 2024, [place]       Allergies:  Allergies   Allergen Reactions    Adhesive Tape Rash    Cabbage Itching and Nausea And Vomiting       CURRENT MEDICATIONS      Previous Medications    BUPROPION (WELLBUTRIN XL) 150 MG EXTENDED RELEASE TABLET    Take 1 tablet by mouth daily    DISULFIRAM  Urine Qual    Collection Time: 05/31/24  8:10 AM   Result Value Ref Range    Preg Test, Ur Negative NEG     EKG 12 Lead    Collection Time: 05/31/24  8:12 AM   Result Value Ref Range    Ventricular Rate 66 BPM    Atrial Rate 66 BPM    P-R Interval 144 ms    QRS Duration 88 ms    Q-T Interval 386 ms    QTc Calculation (Bazett) 404 ms    P Axis 61 degrees    R Axis 73 degrees    T Axis 69 degrees    Diagnosis       Normal sinus rhythm  Normal ECG  When compared with ECG of 02-MAY-2024 12:37,  QT has shortened  Confirmed by SARIKA Barry Charles ([id number]) on 5/31/2024 8:30:10 AM     CBC with Auto Differential    Collection Time: 05/31/24  8:19 AM   Result Value Ref Range    WBC 3.8 3.6 - 11.0 K/uL    RBC 3.86 3.80 - 5.20 M/uL    Hemoglobin 9.7 (L) 11.5 - 16.0 g/dL    Hematocrit 31.7 (L) 35.0 - 47.0 %    MCV 82.1 80.0 - 99.0 FL    MCH 25.1 (L) 26.0 - 34.0 PG    MCHC 30.6 30.0 - 36.5 g/dL    RDW 16.1 (H) 11.5 - 14.5 %    Platelets 444 (H) 150 - 400 K/uL    MPV 9.4 8.9 - 12.9 FL    Nucleated RBCs 0.0 0  WBC    nRBC 0.00 0.00 - 0.01 K/uL    Neutrophils % 63 32 - 75 %    Lymphocytes % 25 12 - 49 %    Monocytes % 9 5 - 13 %    Eosinophils % 2 0 - 7 %    Basophils % 1 0 - 1 %    Immature Granulocytes % 0 0.0 - 0.5 %    Neutrophils Absolute 2.4 1.8 - 8.0 K/UL    Lymphocytes Absolute 0.9 0.8 - 3.5 K/UL    Monocytes Absolute 0.3 0.0 - 1.0 K/UL    Eosinophils Absolute 0.1 0.0 - 0.4 K/UL    Basophils Absolute 0.0 0.0 - 0.1 K/UL    Immature Granulocytes Absolute 0.0 0.00 - 0.04 K/UL    Differential Type AUTOMATED     CMP    Collection Time: 05/31/24  8:19 AM   Result Value Ref Range    Sodium 139 136 - 145 mmol/L    Potassium 3.7 3.5 - 5.1 mmol/L    Chloride 105 97 - 108 mmol/L    CO2 24 21 - 32 mmol/L    Anion Gap 10 5 - 15 mmol/L    Glucose 85 65 - 100 mg/dL    BUN 11 6 - 20 MG/DL    Creatinine 0.98 0.55 - 1.02 MG/DL    BUN/Creatinine Ratio 11 (L) 12 - 20      Est, Glom Filt Rate 76 >60 ml/min/1.73m2    Calcium 8.9 8.5 -

## 2024-07-07 ENCOUNTER — HOSPITAL ENCOUNTER (EMERGENCY)
Facility: HOSPITAL | Age: 39
Discharge: HOME | End: 2024-07-07
Attending: EMERGENCY MEDICINE
Payer: MEDICAID

## 2024-07-07 VITALS
BODY MASS INDEX: 24.84 KG/M2 | DIASTOLIC BLOOD PRESSURE: 87 MMHG | HEART RATE: 93 BPM | HEIGHT: 62 IN | RESPIRATION RATE: 18 BRPM | WEIGHT: 135 LBS | OXYGEN SATURATION: 100 % | TEMPERATURE: 98.8 F | SYSTOLIC BLOOD PRESSURE: 122 MMHG

## 2024-07-07 DIAGNOSIS — J01.90 ACUTE NON-RECURRENT SINUSITIS, UNSPECIFIED LOCATION: ICD-10-CM

## 2024-07-07 DIAGNOSIS — J22 ACUTE RESPIRATORY INFECTION: ICD-10-CM

## 2024-07-07 DIAGNOSIS — R09.81 NASAL CONGESTION: ICD-10-CM

## 2024-07-07 DIAGNOSIS — U07.1 COVID-19 VIRUS INFECTION: Primary | ICD-10-CM

## 2024-07-07 DIAGNOSIS — J02.9 ACUTE PHARYNGITIS, UNSPECIFIED ETIOLOGY: ICD-10-CM

## 2024-07-07 LAB
DEPRECATED S PYO AG THROAT QL EIA: NEGATIVE
FLUAV RNA SPEC QL NAA+PROBE: NOT DETECTED
FLUBV RNA SPEC QL NAA+PROBE: NOT DETECTED
SARS-COV-2 RNA RESP QL NAA+PROBE: DETECTED

## 2024-07-07 PROCEDURE — 87636 SARSCOV2 & INF A&B AMP PRB: CPT

## 2024-07-07 PROCEDURE — 99283 EMERGENCY DEPT VISIT LOW MDM: CPT

## 2024-07-07 PROCEDURE — 6360000002 HC RX W HCPCS: Performed by: EMERGENCY MEDICINE

## 2024-07-07 PROCEDURE — 6370000000 HC RX 637 (ALT 250 FOR IP): Performed by: EMERGENCY MEDICINE

## 2024-07-07 PROCEDURE — 87070 CULTURE OTHR SPECIMN AEROBIC: CPT

## 2024-07-07 PROCEDURE — 87880 STREP A ASSAY W/OPTIC: CPT

## 2024-07-07 RX ORDER — GUAIFENESIN 600 MG/1
600 TABLET, EXTENDED RELEASE ORAL
Status: COMPLETED | OUTPATIENT
Start: 2024-07-07 | End: 2024-07-07

## 2024-07-07 RX ORDER — AZITHROMYCIN 250 MG/1
TABLET, FILM COATED ORAL
Qty: 6 TABLET | Refills: 0 | Status: SHIPPED | OUTPATIENT
Start: 2024-07-07 | End: 2024-07-17

## 2024-07-07 RX ORDER — ALBUTEROL SULFATE 90 UG/1
2 AEROSOL, METERED RESPIRATORY (INHALATION) 4 TIMES DAILY PRN
Qty: 54 G | Refills: 1 | Status: SHIPPED | OUTPATIENT
Start: 2024-07-07

## 2024-07-07 RX ORDER — LIDOCAINE HYDROCHLORIDE 20 MG/ML
15 SOLUTION OROPHARYNGEAL
Status: COMPLETED | OUTPATIENT
Start: 2024-07-07 | End: 2024-07-07

## 2024-07-07 RX ORDER — PREDNISONE 5 MG/1
TABLET ORAL
Qty: 21 EACH | Refills: 0 | Status: SHIPPED | OUTPATIENT
Start: 2024-07-07

## 2024-07-07 RX ORDER — OXYMETAZOLINE HYDROCHLORIDE 0.05 G/100ML
2 SPRAY NASAL ONCE
Status: COMPLETED | OUTPATIENT
Start: 2024-07-07 | End: 2024-07-07

## 2024-07-07 RX ORDER — GUAIFENESIN 600 MG/1
600 TABLET, EXTENDED RELEASE ORAL 2 TIMES DAILY
Qty: 30 TABLET | Refills: 0 | Status: SHIPPED | OUTPATIENT
Start: 2024-07-07 | End: 2024-07-22

## 2024-07-07 RX ORDER — DEXAMETHASONE SODIUM PHOSPHATE 10 MG/ML
10 INJECTION, SOLUTION INTRAMUSCULAR; INTRAVENOUS ONCE
Status: COMPLETED | OUTPATIENT
Start: 2024-07-07 | End: 2024-07-07

## 2024-07-07 RX ADMIN — LIDOCAINE HYDROCHLORIDE 15 ML: 20 SOLUTION ORAL at 05:40

## 2024-07-07 RX ADMIN — GUAIFENESIN 600 MG: 600 TABLET ORAL at 05:40

## 2024-07-07 RX ADMIN — DEXAMETHASONE SODIUM PHOSPHATE 10 MG: 10 INJECTION, SOLUTION INTRAMUSCULAR; INTRAVENOUS at 05:40

## 2024-07-07 RX ADMIN — NASAL DECONGESTANT 2 SPRAY: 0.05 SPRAY NASAL at 05:40

## 2024-07-07 ASSESSMENT — PAIN - FUNCTIONAL ASSESSMENT: PAIN_FUNCTIONAL_ASSESSMENT: NONE - DENIES PAIN

## 2024-07-07 NOTE — ED TRIAGE NOTES
Patient to ED from home for c/o productive cough, sore throat, and sinus pressure x 3 weeks. Patient states took OTC medications yesterday with minimal relief.

## 2024-07-07 NOTE — ED NOTES
Pt presents to ED with c/o sore throat, nasal pressure, & productive cough for 3 weeks; pt reports hx of bronchitis. Pt is A&Ox4, RR even & unlabored, skin is warm & dry. Pt in NAD, updated on plan of care, call light within reach.      Emergency Department Nursing Plan of Care       The Nursing Plan of Care is developed from the Nursing assessment and Emergency Department Attending provider initial evaluation.  The plan of care may be reviewed in the “ED Provider note”.    The Plan of Care was developed with the following considerations:   Patient / Family readiness to learn indicated by:verbalized understanding  Persons(s) to be included in education: patient  Barriers to Learning/Limitations: No    Signed     Erendira White RN    7/7/2024   5:46 AM

## 2024-07-07 NOTE — ED PROVIDER NOTES
START taking these medications      albuterol sulfate  (90 Base) MCG/ACT inhaler  Commonly known as: Ventolin HFA  Inhale 2 puffs into the lungs 4 times daily as needed for Wheezing     azithromycin 250 MG tablet  Commonly known as: ZITHROMAX  500mg on day 1 followed by 250mg on days 2 - 5     guaiFENesin 600 MG extended release tablet  Commonly known as: MUCINEX  Take 1 tablet by mouth 2 times daily for 15 days     predniSONE 5 MG (21) Tbpk  Use as directed            ASK your doctor about these medications      buPROPion 150 MG extended release tablet  Commonly known as: WELLBUTRIN XL  Take 1 tablet by mouth daily     disulfiram 250 MG tablet  Commonly known as: Antabuse  Take 1 tablet by mouth daily     ferrous sulfate 325 (65 Fe) MG tablet  Commonly known as: IRON 325  Take 1 tablet by mouth daily (with breakfast)     topiramate 50 MG tablet  Commonly known as: TOPAMAX  Take 1 tablet by mouth 2 times daily               Where to Get Your Medications        These medications were sent to [facility]/pharmacy #[id number] - [place], [place] - [address] -  [phone] -  [phone]  [address] [place] [address]      Phone: [phone]   albuterol sulfate  (90 Base) MCG/ACT inhaler  azithromycin 250 MG tablet  guaiFENesin 600 MG extended release tablet  predniSONE 5 MG (21) Tbpk           3. PATIENT REFERRED TO:  [facility] EMERGENCY DEPT  [address] [facility] [address] [phone]    As needed, If symptoms worsen                            CLINICAL IMPRESSION     1. COVID-19 virus infection    2. Acute respiratory infection    3. Acute pharyngitis, unspecified etiology    4. Acute non-recurrent sinusitis, unspecified location    5. Nasal congestion      Attestation:  I am the attending of record for this patient. I personally performed the services described in this documentation on this date, 7/7/2024 for patient, Sohail Gary. I have reviewed the chart and verified that the record is

## 2024-07-07 NOTE — ED NOTES
Discharge instructions were given to the patient by GUI Krishna.     The patient left the Emergency Department alert and oriented and in no acute distress with 4 prescriptions. The patient was encouraged to call or return to the ED for worsening issues or problems and was encouraged to schedule a follow up appointment for continuing care.     Ambulation assessment completed before discharge.  Pt left Emergency Department ambulating at baseline with no ortho devices  Ortho device education: none    The patient verbalized understanding of discharge instructions and prescriptions, all questions were answered. The patient has no further concerns at this time.

## 2024-07-09 LAB
BACTERIA SPEC CULT: NORMAL
SERVICE CMNT-IMP: NORMAL

## 2024-08-08 ENCOUNTER — HOSPITAL ENCOUNTER (EMERGENCY)
Facility: HOSPITAL | Age: 39
Discharge: HOME | End: 2024-08-08
Attending: EMERGENCY MEDICINE
Payer: MEDICAID

## 2024-08-08 ENCOUNTER — APPOINTMENT (OUTPATIENT)
Facility: HOSPITAL | Age: 39
End: 2024-08-08
Payer: MEDICAID

## 2024-08-08 VITALS
BODY MASS INDEX: 23.55 KG/M2 | SYSTOLIC BLOOD PRESSURE: 114 MMHG | WEIGHT: 128 LBS | RESPIRATION RATE: 12 BRPM | TEMPERATURE: 98.3 F | HEART RATE: 68 BPM | HEIGHT: 62 IN | DIASTOLIC BLOOD PRESSURE: 88 MMHG | OXYGEN SATURATION: 100 %

## 2024-08-08 DIAGNOSIS — R42 LIGHTHEADEDNESS: Primary | ICD-10-CM

## 2024-08-08 DIAGNOSIS — R06.00 DYSPNEA, UNSPECIFIED TYPE: ICD-10-CM

## 2024-08-08 LAB
ALBUMIN SERPL-MCNC: 3.5 G/DL (ref 3.5–5)
ALBUMIN/GLOB SERPL: 0.9 (ref 1.1–2.2)
ALP SERPL-CCNC: 98 U/L (ref 45–117)
ALT SERPL-CCNC: 21 U/L (ref 12–78)
AMPHET UR QL SCN: NEGATIVE
ANION GAP SERPL CALC-SCNC: 11 MMOL/L (ref 5–15)
AST SERPL-CCNC: 50 U/L (ref 15–37)
BARBITURATES UR QL SCN: NEGATIVE
BASOPHILS # BLD: 0 K/UL (ref 0–0.1)
BASOPHILS NFR BLD: 1 % (ref 0–1)
BENZODIAZ UR QL: NEGATIVE
BILIRUB SERPL-MCNC: 0.3 MG/DL (ref 0.2–1)
BUN SERPL-MCNC: 15 MG/DL (ref 6–20)
BUN/CREAT SERPL: 19 (ref 12–20)
CALCIUM SERPL-MCNC: 8.5 MG/DL (ref 8.5–10.1)
CANNABINOIDS UR QL SCN: NEGATIVE
CHLORIDE SERPL-SCNC: 104 MMOL/L (ref 97–108)
CO2 SERPL-SCNC: 24 MMOL/L (ref 21–32)
COCAINE UR QL SCN: NEGATIVE
CREAT SERPL-MCNC: 0.77 MG/DL (ref 0.55–1.02)
D DIMER PPP FEU-MCNC: 0.64 MG/L FEU (ref 0–0.65)
DIFFERENTIAL METHOD BLD: ABNORMAL
EKG ATRIAL RATE: 59 BPM
EKG DIAGNOSIS: NORMAL
EKG P AXIS: 52 DEGREES
EKG P-R INTERVAL: 156 MS
EKG Q-T INTERVAL: 402 MS
EKG QRS DURATION: 82 MS
EKG QTC CALCULATION (BAZETT): 397 MS
EKG R AXIS: 78 DEGREES
EKG T AXIS: 72 DEGREES
EKG VENTRICULAR RATE: 59 BPM
EOSINOPHIL # BLD: 0.1 K/UL (ref 0–0.4)
EOSINOPHIL NFR BLD: 3 % (ref 0–7)
ERYTHROCYTE [DISTWIDTH] IN BLOOD BY AUTOMATED COUNT: 16.5 % (ref 11.5–14.5)
GLOBULIN SER CALC-MCNC: 3.8 G/DL (ref 2–4)
GLUCOSE SERPL-MCNC: 83 MG/DL (ref 65–100)
HCT VFR BLD AUTO: 32.4 % (ref 35–47)
HGB BLD-MCNC: 10 G/DL (ref 11.5–16)
IMM GRANULOCYTES # BLD AUTO: 0 K/UL (ref 0–0.04)
IMM GRANULOCYTES NFR BLD AUTO: 0 % (ref 0–0.5)
LIPASE SERPL-CCNC: 50 U/L (ref 13–75)
LYMPHOCYTES # BLD: 1.2 K/UL (ref 0.8–3.5)
LYMPHOCYTES NFR BLD: 31 % (ref 12–49)
Lab: NORMAL
MCH RBC QN AUTO: 27.9 PG (ref 26–34)
MCHC RBC AUTO-ENTMCNC: 30.9 G/DL (ref 30–36.5)
MCV RBC AUTO: 90.5 FL (ref 80–99)
METHADONE UR QL: NEGATIVE
MONOCYTES # BLD: 0.3 K/UL (ref 0–1)
MONOCYTES NFR BLD: 7 % (ref 5–13)
NEUTS SEG # BLD: 2.3 K/UL (ref 1.8–8)
NEUTS SEG NFR BLD: 58 % (ref 32–75)
NRBC # BLD: 0 K/UL (ref 0–0.01)
NRBC BLD-RTO: 0 PER 100 WBC
OPIATES UR QL: NEGATIVE
PCP UR QL: NEGATIVE
PLATELET # BLD AUTO: 333 K/UL (ref 150–400)
PMV BLD AUTO: 9.3 FL (ref 8.9–12.9)
POTASSIUM SERPL-SCNC: 5 MMOL/L (ref 3.5–5.1)
PROT SERPL-MCNC: 7.3 G/DL (ref 6.4–8.2)
RBC # BLD AUTO: 3.58 M/UL (ref 3.8–5.2)
SODIUM SERPL-SCNC: 139 MMOL/L (ref 136–145)
TROPONIN I SERPL HS-MCNC: <4 NG/L (ref 0–51)
WBC # BLD AUTO: 3.9 K/UL (ref 3.6–11)

## 2024-08-08 PROCEDURE — 71045 X-RAY EXAM CHEST 1 VIEW: CPT

## 2024-08-08 PROCEDURE — 96360 HYDRATION IV INFUSION INIT: CPT

## 2024-08-08 PROCEDURE — 6370000000 HC RX 637 (ALT 250 FOR IP): Performed by: EMERGENCY MEDICINE

## 2024-08-08 PROCEDURE — 85025 COMPLETE CBC W/AUTO DIFF WBC: CPT

## 2024-08-08 PROCEDURE — 96361 HYDRATE IV INFUSION ADD-ON: CPT

## 2024-08-08 PROCEDURE — 85379 FIBRIN DEGRADATION QUANT: CPT

## 2024-08-08 PROCEDURE — 83690 ASSAY OF LIPASE: CPT

## 2024-08-08 PROCEDURE — 93005 ELECTROCARDIOGRAM TRACING: CPT | Performed by: EMERGENCY MEDICINE

## 2024-08-08 PROCEDURE — 93010 ELECTROCARDIOGRAM REPORT: CPT | Performed by: SPECIALIST

## 2024-08-08 PROCEDURE — 2580000003 HC RX 258: Performed by: EMERGENCY MEDICINE

## 2024-08-08 PROCEDURE — 80307 DRUG TEST PRSMV CHEM ANLYZR: CPT

## 2024-08-08 PROCEDURE — 80053 COMPREHEN METABOLIC PANEL: CPT

## 2024-08-08 PROCEDURE — 84484 ASSAY OF TROPONIN QUANT: CPT

## 2024-08-08 PROCEDURE — 99285 EMERGENCY DEPT VISIT HI MDM: CPT

## 2024-08-08 RX ORDER — 0.9 % SODIUM CHLORIDE 0.9 %
1000 INTRAVENOUS SOLUTION INTRAVENOUS ONCE
Status: COMPLETED | OUTPATIENT
Start: 2024-08-08 | End: 2024-08-08

## 2024-08-08 RX ORDER — KETOROLAC TROMETHAMINE 30 MG/ML
15 INJECTION, SOLUTION INTRAMUSCULAR; INTRAVENOUS ONCE
Status: DISCONTINUED | OUTPATIENT
Start: 2024-08-08 | End: 2024-08-08 | Stop reason: HOSPADM

## 2024-08-08 RX ORDER — FAMOTIDINE 20 MG/1
20 TABLET, FILM COATED ORAL 2 TIMES DAILY
Qty: 20 TABLET | Refills: 0 | Status: SHIPPED | OUTPATIENT
Start: 2024-08-08 | End: 2024-08-18

## 2024-08-08 RX ADMIN — SODIUM CHLORIDE 1000 ML: 9 INJECTION, SOLUTION INTRAVENOUS at 02:45

## 2024-08-08 RX ADMIN — LIDOCAINE HYDROCHLORIDE 40 ML: 20 SOLUTION ORAL at 01:57

## 2024-08-08 RX ADMIN — SODIUM CHLORIDE 1000 ML: 9 INJECTION, SOLUTION INTRAVENOUS at 03:51

## 2024-08-08 ASSESSMENT — PAIN - FUNCTIONAL ASSESSMENT
PAIN_FUNCTIONAL_ASSESSMENT: 0-10
PAIN_FUNCTIONAL_ASSESSMENT: 0-10

## 2024-08-08 ASSESSMENT — PAIN DESCRIPTION - LOCATION
LOCATION: CHEST
LOCATION: CHEST

## 2024-08-08 ASSESSMENT — PAIN DESCRIPTION - ORIENTATION
ORIENTATION: MID
ORIENTATION: MID

## 2024-08-08 ASSESSMENT — PAIN SCALES - GENERAL
PAINLEVEL_OUTOF10: 1
PAINLEVEL_OUTOF10: 1

## 2024-08-08 ASSESSMENT — PAIN DESCRIPTION - DESCRIPTORS
DESCRIPTORS: DISCOMFORT
DESCRIPTORS: DULL

## 2024-08-08 NOTE — ED PROVIDER NOTES
[facility] EMERGENCY DEPT  EMERGENCY DEPARTMENT ENCOUNTER       Pt Name: Sohail Gary  MRN: [id number]  Birthdate 1985  Date of evaluation: 2024  Provider: Ami Alvarez MD   PCP: No primary care provider on file.  Note Started: 3:16 AM EDT 24     CHIEF COMPLAINT       Chief Complaint   Patient presents with    Shortness of Breath    Chest Pain        HISTORY OF PRESENT ILLNESS: 1 or more elements      History From: patient, History limited by: none     Sohail Gary is a 38 y.o. female who presents with a chief complaint of lightheadedness, shortness of breath, and midsternal chest pain.       Please See MDM for Additional Details of the HPI/PMH  Nursing Notes were all reviewed and agreed with or any disagreements were addressed in the HPI.     REVIEW OF SYSTEMS        Positives and Pertinent negatives as per HPI.    PAST HISTORY     Past Medical History:  Past Medical History:   Diagnosis Date    Other ill-defined conditions(669.89)     HSV       Past Surgical History:  Past Surgical History:   Procedure Laterality Date     SECTION         Family History:  History reviewed. No pertinent family history.    Social History:  Social History     Tobacco Use    Smoking status: Never    Smokeless tobacco: Never   Substance Use Topics    Alcohol use: Not Currently    Drug use: Yes     Types: Cocaine, Marijuana (Weed)     Comment: cocaine, 2024, [place]       Allergies:  Allergies   Allergen Reactions    Adhesive Tape Rash    Cabbage Itching and Nausea And Vomiting       CURRENT MEDICATIONS      Previous Medications    ALBUTEROL SULFATE HFA (VENTOLIN HFA) 108 (90 BASE) MCG/ACT INHALER    Inhale 2 puffs into the lungs 4 times daily as needed for Wheezing    BUPROPION (WELLBUTRIN XL) 150 MG EXTENDED RELEASE TABLET    Take 1 tablet by mouth daily    DISULFIRAM (ANTABUSE) 250 MG TABLET    Take 1 tablet by mouth daily    FERROUS SULFATE (IRON 325) 325 (65 FE) MG TABLET    Take 1 tablet by mouth

## 2024-08-08 NOTE — ED NOTES
Discharge instructions were given to the patient by jose reyes.     The patient left the Emergency Department alert and oriented and in no acute distress with 1 prescriptions. The patient was encouraged to call or return to the ED for worsening issues or problems and was encouraged to schedule a follow up appointment for continuing care.     Ambulation assessment completed before discharge.  Pt left Emergency Department ambulating at baseline with no ortho devices  Ortho device education: none    The patient verbalized understanding of discharge instructions and prescriptions, all questions were answered. The patient has no further concerns at this time.

## 2024-08-08 NOTE — ED TRIAGE NOTES
Pt presents to ED via EMS with c/o SOB that woke her out of her sleep around 0015 today. Pt also reports dizziness, mid chest pain, & blurry vision. Pt endorses cocaine use 2 days ago. Per EMS, pt was also c/o SI, but during triage denies SI/HI/AVH.

## 2024-08-08 NOTE — ED NOTES
See triage note. Pt is alert and oriented x 4, RR even and unlabored, skin is warm and dry. Assesment completed and pt updated on plan of care.       Emergency Department Nursing Plan of Care       The Nursing Plan of Care is developed from the Nursing assessment and Emergency Department Attending provider initial evaluation.  The plan of care may be reviewed in the “ED Provider note”.    The Plan of Care was developed with the following considerations:   Patient / Family readiness to learn indicated by:verbalized understanding  Persons(s) to be included in education: patient  Barriers to Learning/Limitations:None    Signed     Erendira White RN    8/8/2024   1:39 AM

## 2024-08-12 ENCOUNTER — HOSPITAL ENCOUNTER (INPATIENT)
Facility: HOSPITAL | Age: 39
LOS: 3 days | Discharge: HOME | DRG: 751 | End: 2024-08-15
Admitting: PSYCHIATRY & NEUROLOGY
Payer: MEDICAID

## 2024-08-12 ENCOUNTER — APPOINTMENT (OUTPATIENT)
Facility: HOSPITAL | Age: 39
DRG: 751 | End: 2024-08-12
Payer: MEDICAID

## 2024-08-12 DIAGNOSIS — R45.851 SUICIDAL IDEATIONS: ICD-10-CM

## 2024-08-12 DIAGNOSIS — R06.00 DYSPNEA, UNSPECIFIED TYPE: Primary | ICD-10-CM

## 2024-08-12 PROBLEM — F33.9 MAJOR DEPRESSIVE DISORDER, RECURRENT (HCC): Status: ACTIVE | Noted: 2024-08-12

## 2024-08-12 LAB
ALBUMIN SERPL-MCNC: 3.4 G/DL (ref 3.5–5)
ALBUMIN/GLOB SERPL: 1 (ref 1.1–2.2)
ALP SERPL-CCNC: 67 U/L (ref 45–117)
ALT SERPL-CCNC: 15 U/L (ref 12–78)
AMPHET UR QL SCN: NEGATIVE
ANION GAP SERPL CALC-SCNC: 9 MMOL/L (ref 5–15)
APPEARANCE UR: CLEAR
AST SERPL-CCNC: 14 U/L (ref 15–37)
BACTERIA URNS QL MICRO: NEGATIVE /HPF
BARBITURATES UR QL SCN: NEGATIVE
BASOPHILS # BLD: 0 K/UL (ref 0–0.1)
BASOPHILS NFR BLD: 1 % (ref 0–1)
BENZODIAZ UR QL: NEGATIVE
BILIRUB SERPL-MCNC: 0.2 MG/DL (ref 0.2–1)
BILIRUB UR QL: NEGATIVE
BUN SERPL-MCNC: 11 MG/DL (ref 6–20)
BUN/CREAT SERPL: 14 (ref 12–20)
CALCIUM SERPL-MCNC: 8.3 MG/DL (ref 8.5–10.1)
CANNABINOIDS UR QL SCN: NEGATIVE
CHLORIDE SERPL-SCNC: 108 MMOL/L (ref 97–108)
CO2 SERPL-SCNC: 27 MMOL/L (ref 21–32)
COCAINE UR QL SCN: NEGATIVE
COLOR UR: ABNORMAL
CREAT SERPL-MCNC: 0.78 MG/DL (ref 0.55–1.02)
DIFFERENTIAL METHOD BLD: ABNORMAL
EOSINOPHIL # BLD: 0.1 K/UL (ref 0–0.4)
EOSINOPHIL NFR BLD: 1 % (ref 0–7)
EPITH CASTS URNS QL MICRO: ABNORMAL /LPF
ERYTHROCYTE [DISTWIDTH] IN BLOOD BY AUTOMATED COUNT: 16 % (ref 11.5–14.5)
ETHANOL SERPL-MCNC: <10 MG/DL (ref 0–0.08)
FLUAV RNA SPEC QL NAA+PROBE: NOT DETECTED
FLUBV RNA SPEC QL NAA+PROBE: NOT DETECTED
GLOBULIN SER CALC-MCNC: 3.3 G/DL (ref 2–4)
GLUCOSE BLD STRIP.AUTO-MCNC: 184 MG/DL (ref 65–117)
GLUCOSE SERPL-MCNC: 95 MG/DL (ref 65–100)
GLUCOSE UR STRIP.AUTO-MCNC: NEGATIVE MG/DL
HCG UR QL: NEGATIVE
HCT VFR BLD AUTO: 30.6 % (ref 35–47)
HGB BLD-MCNC: 9.6 G/DL (ref 11.5–16)
HGB UR QL STRIP: NEGATIVE
IMM GRANULOCYTES # BLD AUTO: 0 K/UL (ref 0–0.04)
IMM GRANULOCYTES NFR BLD AUTO: 0 % (ref 0–0.5)
KETONES UR QL STRIP.AUTO: ABNORMAL MG/DL
LEUKOCYTE ESTERASE UR QL STRIP.AUTO: NEGATIVE
LYMPHOCYTES # BLD: 1.2 K/UL (ref 0.8–3.5)
LYMPHOCYTES NFR BLD: 22 % (ref 12–49)
Lab: NORMAL
MCH RBC QN AUTO: 28.3 PG (ref 26–34)
MCHC RBC AUTO-ENTMCNC: 31.4 G/DL (ref 30–36.5)
MCV RBC AUTO: 90.3 FL (ref 80–99)
METHADONE UR QL: NEGATIVE
MONOCYTES # BLD: 0.5 K/UL (ref 0–1)
MONOCYTES NFR BLD: 9 % (ref 5–13)
NEUTS SEG # BLD: 3.6 K/UL (ref 1.8–8)
NEUTS SEG NFR BLD: 67 % (ref 32–75)
NITRITE UR QL STRIP.AUTO: NEGATIVE
NRBC # BLD: 0 K/UL (ref 0–0.01)
NRBC BLD-RTO: 0 PER 100 WBC
OPIATES UR QL: NEGATIVE
PCP UR QL: NEGATIVE
PH UR STRIP: 8 (ref 5–8)
PLATELET # BLD AUTO: 284 K/UL (ref 150–400)
PMV BLD AUTO: 9.8 FL (ref 8.9–12.9)
POTASSIUM SERPL-SCNC: 3.9 MMOL/L (ref 3.5–5.1)
PROT SERPL-MCNC: 6.7 G/DL (ref 6.4–8.2)
PROT UR STRIP-MCNC: NEGATIVE MG/DL
RBC # BLD AUTO: 3.39 M/UL (ref 3.8–5.2)
RBC #/AREA URNS HPF: ABNORMAL /HPF (ref 0–5)
SARS-COV-2 RNA RESP QL NAA+PROBE: NOT DETECTED
SERVICE CMNT-IMP: ABNORMAL
SODIUM SERPL-SCNC: 144 MMOL/L (ref 136–145)
SP GR UR REFRACTOMETRY: 1.02
URINE CULTURE IF INDICATED: ABNORMAL
UROBILINOGEN UR QL STRIP.AUTO: 1 EU/DL (ref 0.2–1)
WBC # BLD AUTO: 5.3 K/UL (ref 3.6–11)
WBC URNS QL MICRO: ABNORMAL /HPF (ref 0–4)

## 2024-08-12 PROCEDURE — 85025 COMPLETE CBC W/AUTO DIFF WBC: CPT

## 2024-08-12 PROCEDURE — 90791 PSYCH DIAGNOSTIC EVALUATION: CPT

## 2024-08-12 PROCEDURE — 99285 EMERGENCY DEPT VISIT HI MDM: CPT

## 2024-08-12 PROCEDURE — 80307 DRUG TEST PRSMV CHEM ANLYZR: CPT

## 2024-08-12 PROCEDURE — 71045 X-RAY EXAM CHEST 1 VIEW: CPT

## 2024-08-12 PROCEDURE — 81025 URINE PREGNANCY TEST: CPT

## 2024-08-12 PROCEDURE — 87636 SARSCOV2 & INF A&B AMP PRB: CPT

## 2024-08-12 PROCEDURE — 82962 GLUCOSE BLOOD TEST: CPT

## 2024-08-12 PROCEDURE — 80053 COMPREHEN METABOLIC PANEL: CPT

## 2024-08-12 PROCEDURE — 1240000000 HC EMOTIONAL WELLNESS R&B

## 2024-08-12 PROCEDURE — 82077 ASSAY SPEC XCP UR&BREATH IA: CPT

## 2024-08-12 PROCEDURE — 36415 COLL VENOUS BLD VENIPUNCTURE: CPT

## 2024-08-12 PROCEDURE — 81001 URINALYSIS AUTO W/SCOPE: CPT

## 2024-08-12 RX ORDER — POLYETHYLENE GLYCOL 3350 17 G/17G
17 POWDER, FOR SOLUTION ORAL DAILY PRN
Status: DISCONTINUED | OUTPATIENT
Start: 2024-08-12 | End: 2024-08-15 | Stop reason: HOSPADM

## 2024-08-12 RX ORDER — ACETAMINOPHEN 325 MG/1
650 TABLET ORAL EVERY 4 HOURS PRN
Status: DISCONTINUED | OUTPATIENT
Start: 2024-08-12 | End: 2024-08-15 | Stop reason: HOSPADM

## 2024-08-12 RX ORDER — HALOPERIDOL 5 MG/ML
5 INJECTION INTRAMUSCULAR EVERY 4 HOURS PRN
Status: DISCONTINUED | OUTPATIENT
Start: 2024-08-12 | End: 2024-08-15 | Stop reason: HOSPADM

## 2024-08-12 RX ORDER — HYDROXYZINE HYDROCHLORIDE 25 MG/1
50 TABLET, FILM COATED ORAL 3 TIMES DAILY PRN
Status: DISCONTINUED | OUTPATIENT
Start: 2024-08-12 | End: 2024-08-15 | Stop reason: HOSPADM

## 2024-08-12 RX ORDER — MAGNESIUM HYDROXIDE/ALUMINUM HYDROXICE/SIMETHICONE 120; 1200; 1200 MG/30ML; MG/30ML; MG/30ML
30 SUSPENSION ORAL EVERY 6 HOURS PRN
Status: DISCONTINUED | OUTPATIENT
Start: 2024-08-12 | End: 2024-08-15 | Stop reason: HOSPADM

## 2024-08-12 RX ORDER — DIPHENHYDRAMINE HYDROCHLORIDE 50 MG/ML
50 INJECTION INTRAMUSCULAR; INTRAVENOUS EVERY 4 HOURS PRN
Status: DISCONTINUED | OUTPATIENT
Start: 2024-08-12 | End: 2024-08-15 | Stop reason: HOSPADM

## 2024-08-12 RX ORDER — TRAZODONE HYDROCHLORIDE 50 MG/1
50 TABLET ORAL NIGHTLY PRN
Status: DISCONTINUED | OUTPATIENT
Start: 2024-08-12 | End: 2024-08-15 | Stop reason: HOSPADM

## 2024-08-12 RX ORDER — HALOPERIDOL 5 MG/1
5 TABLET ORAL EVERY 4 HOURS PRN
Status: DISCONTINUED | OUTPATIENT
Start: 2024-08-12 | End: 2024-08-15 | Stop reason: HOSPADM

## 2024-08-12 ASSESSMENT — PAIN SCALES - GENERAL
PAINLEVEL_OUTOF10: 2
PAINLEVEL_OUTOF10: 0

## 2024-08-12 ASSESSMENT — LIFESTYLE VARIABLES
HOW MANY STANDARD DRINKS CONTAINING ALCOHOL DO YOU HAVE ON A TYPICAL DAY: PATIENT DOES NOT DRINK
HOW MANY STANDARD DRINKS CONTAINING ALCOHOL DO YOU HAVE ON A TYPICAL DAY: PATIENT DOES NOT DRINK
HOW OFTEN DO YOU HAVE A DRINK CONTAINING ALCOHOL: NEVER

## 2024-08-12 ASSESSMENT — PAIN DESCRIPTION - LOCATION: LOCATION: CHEST

## 2024-08-12 ASSESSMENT — SLEEP AND FATIGUE QUESTIONNAIRES
DO YOU HAVE DIFFICULTY SLEEPING: YES
DO YOU USE A SLEEP AID: YES
AVERAGE NUMBER OF SLEEP HOURS: 4

## 2024-08-12 ASSESSMENT — PAIN DESCRIPTION - ORIENTATION: ORIENTATION: MID

## 2024-08-12 ASSESSMENT — PAIN DESCRIPTION - DESCRIPTORS: DESCRIPTORS: DISCOMFORT

## 2024-08-12 ASSESSMENT — PAIN DESCRIPTION - PAIN TYPE: TYPE: DEEP SOMATIC PAIN

## 2024-08-12 ASSESSMENT — PAIN - FUNCTIONAL ASSESSMENT: PAIN_FUNCTIONAL_ASSESSMENT: ACTIVITIES ARE NOT PREVENTED

## 2024-08-12 NOTE — ED TRIAGE NOTES
Pt came via EMS. Pt was seen here on 8/8 for chest pain, SOB, etc. EMS placed IV in route. Pt has SOB and was prescribed inhaler with no relief. IV present.    Pt reports history of mental health. Pt reports SI but denies HI. Pt denies AH/VH. Pt reports having intent to go to the bridge, but her  wouldn't let her out of the house.

## 2024-08-12 NOTE — GROUP NOTE
Group Therapy Note    Date: 8/12/2024    Group Start Time: 1500  Group End Time: 1600  Group Topic: Recreational    [facility] ACUTE [facility]    Deidre Wilcox        Group Therapy Note    Attendees: 9       Patient's Goal:  To concentrate on selected task    Notes:  Pt did not attend session    Discipline Responsible: Recreational Therapist      Signature:  DEIDRE WILCOX

## 2024-08-12 NOTE — BSMART NOTE
Patient has been accepted to [facility] by Dr Syed.  
patient's vision is normal.      Daryn Wiley, LPC

## 2024-08-12 NOTE — ED NOTES
Security made aware that patient has room ready and pt is okay to be brought up to U. Awaiting additional security to assist with pt transfer.

## 2024-08-12 NOTE — ED NOTES
TRANSFER - OUT REPORT:    Verbal report given to GUI Gudino on Sohail Gary  being transferred to U for routine progression of patient care       Report consisted of patient's Situation, Background, Assessment and   Recommendations(SBAR).     Information from the following report(s) ED SBAR, Recent Results, and Event Log was reviewed with the receiving nurse.    Kenney Fall Assessment:    Presents to emergency department  because of falls (Syncope, seizure, or loss of consciousness): No  Age > 70: No  Altered Mental Status, Intoxication with alcohol or substance confusion (Disorientation, impaired judgment, poor safety awaremess, or inability to follow instructions): No  Impaired Mobility: Ambulates or transfers with assistive devices or assistance; Unable to ambulate or transer.: No  Nursing Judgement: No          Lines:   Peripheral IV 08/12/24 Left;Proximal Forearm (Active)        Opportunity for questions and clarification was provided.      Patient transported with: Belongings and Security

## 2024-08-12 NOTE — PROGRESS NOTES
BSMART assessment completed, and suicide risk level noted to be high. Primary Nurse Steffany and Physician Dr. Monique notified. Concerns not observed.  Security/Off- has not been notified.

## 2024-08-12 NOTE — ED NOTES
Pt presents ambulatory to ED complaining of SOB without cough or chest pain. Pt breathing is symmetrical and unlabored, she is speaking in full sentences. She reports being seen at [facility] ED and [facility] ED this weekend. She was prescribed an inhaler and has been using it occasionally with no relief per pt. She reports not sleeping well due to \"being afraid of dying, in front of my kids\". Pt reports SI thoughts with a plan to jump off a bridge, she has mental health hx and prescribed psychiatric medications, she reports not taking them prior to arrival. Pt denies HI thoughts, AH/VH. Pt is alert and oriented x 4, RR even and unlabored, skin is warm and dry. Assessment completed and pt updated on plan of care.        Emergency Department Nursing Plan of Care        The Nursing Plan of Care is developed from the Nursing assessment and Emergency Department Attending provider initial evaluation.  The plan of care may be reviewed in the “ED Provider note”.

## 2024-08-13 PROCEDURE — 1240000000 HC EMOTIONAL WELLNESS R&B

## 2024-08-13 RX ORDER — NAPROXEN 500 MG/1
500 TABLET ORAL 2 TIMES DAILY PRN
COMMUNITY

## 2024-08-13 RX ORDER — IBUPROFEN 400 MG/1
400 TABLET ORAL EVERY 6 HOURS PRN
Status: DISCONTINUED | OUTPATIENT
Start: 2024-08-13 | End: 2024-08-15 | Stop reason: HOSPADM

## 2024-08-13 RX ORDER — CETIRIZINE HYDROCHLORIDE 10 MG/1
10 TABLET ORAL
COMMUNITY

## 2024-08-13 ASSESSMENT — PAIN DESCRIPTION - LOCATION: LOCATION: HEAD

## 2024-08-13 ASSESSMENT — PAIN SCALES - GENERAL
PAINLEVEL_OUTOF10: 2
PAINLEVEL_OUTOF10: 0

## 2024-08-13 ASSESSMENT — PAIN - FUNCTIONAL ASSESSMENT: PAIN_FUNCTIONAL_ASSESSMENT: ACTIVITIES ARE NOT PREVENTED

## 2024-08-13 ASSESSMENT — PAIN DESCRIPTION - DESCRIPTORS: DESCRIPTORS: ACHING

## 2024-08-13 NOTE — PLAN OF CARE
Problem: Depression  Goal: Will be euthymic at discharge  Description: INTERVENTIONS:  1. Administer medication as ordered  2. Provide emotional support via 1:1 interaction with staff  3. Encourage involvement in milieu/groups/activities  4. Monitor for social isolation  8/13/2024 1148 by Cheyenne Paulino RN  Outcome: Not Progressing  8/13/2024 0448 by Julia Martinez RN  Outcome: Progressing     Problem: Behavior  Goal: Pt/Family maintain appropriate behavior and adhere to behavioral management agreement, if implemented  Description: INTERVENTIONS:  1. Assess patient/family's coping skills and  non-compliant behavior (including use of illegal substances)  2. Notify security of behavior or suspected illegal substances which indicate the need for search of the family and/or belongings  3. Encourage verbalization of thoughts and concerns in a socially appropriate manner  4. Utilize positive, consistent limit setting strategies supporting safety of patient, staff and others  5. Encourage participation in the decision making process about the behavioral management agreement  6. If a visitor's behavior poses a threat to safety call refer to organization policy.  7. Initiate consult with , Psychosocial CNS, Spiritual Care as appropriate  8/13/2024 1148 by Cheyenne Paulino RN  Outcome: Not Progressing  8/13/2024 0453 by Julia Martinez RN  Outcome: Progressing  8/13/2024 0448 by Julia Martinez RN  Outcome: Progressing  Flowsheets (Taken 8/12/2024 1654 by Shahab Del Castillo, RN)  Patient/family maintains appropriate behavior and adheres to behavioral management agreement, if implemented: Assess patient/family’s coping skills and  non-compliant behavior (including use of illegal substances)

## 2024-08-13 NOTE — PLAN OF CARE
Problem: Anxiety  Goal: Will report anxiety at manageable levels  Description: INTERVENTIONS:  1. Administer medication as ordered  2. Teach and rehearse alternative coping skills  3. Provide emotional support with 1:1 interaction with staff  8/13/2024 0453 by Julia Martinez RN  Outcome: Progressing  8/13/2024 0448 by Julia Martinez RN  Outcome: Progressing  Flowsheets (Taken 8/12/2024 1654 by Shahab Del Castillo RN)  Will report anxiety at manageable levels: Administer medication as ordered     Problem: Drug Abuse/Detox  Goal: Will have no detox symptoms and will verbalize plan for changing drug-related behavior  Description: INTERVENTIONS:  1. Administer medication as ordered  2. Monitor physical status  3. Provide emotional support with 1:1 interaction with staff  4. Encourage  recovery focused treatment   Recent Flowsheet Documentation  Taken 8/12/2024 1654 by Shahab Del Castillo RN  Will have no detox symptoms and will verbalize plan for changing drug-related behavior: Administer medication as ordered     Problem: Sleep Disturbance  Goal: Will exhibit normal sleeping pattern  Description: INTERVENTIONS:  1. Administer medication as ordered  2. Decrease environmental stimuli, including noise, as appropriate  3. Discourage social isolation and naps during the day  Outcome: Progressing     Problem: Self Care Deficit  Goal: Return ADL status to a safe level of function  Description: INTERVENTIONS:  1. Administer medication as ordered  2. Assess ADL deficits and provide assistive devices as needed  3. Obtain PT/OT consults as needed  4. Assist and instruct patient to increase activity and self care as tolerated  Outcome: Progressing     Problem: Pain  Goal: Verbalizes/displays adequate comfort level or baseline comfort level  8/13/2024 0453 by Julia Martinez RN  Outcome: Progressing  8/13/2024 0448 by Julia Martinez RN  Outcome: Progressing

## 2024-08-13 NOTE — CARE COORDINATION
08/13/24 0846   Suicidal Ideation   Wish to be Dead Lifetime - Yes;Past 1 month - Yes   Non-Specific Active Suicidal Thoughts Lifetime - Yes;Past 1 month - Yes   Suicidal Behavior Trigger Wish to be Dead;Non-Specific Active Suicidal Thoughts   Active Suicidal Ideation with Any Methods (Not Plan) without Intent to Act Lifetime - Yes;Past 1 month - Yes   Active Suicidal Ideation with Some Intent to Act, without Specific Plan Lifetime - Yes;Past 1 month - No   Active Suicidal Ideation with Specific Plan and Intent Lifetime - Yes;Past 1 month - No   Intensity of Ideation   Lifetime - Most Severe Ideation 2   Lifetime - Most Recent Ideation 4   Frequency 2-5 times in a week   Duration 1-4 hours/a lot of time   Controllability Can control thoughts with some difficulty   Deterrents Deterrents probably stopped you   Reasons for Ideation Completely to end or stop the pain   Suicidal Behavior   Actual Attempt Lifetime - Yes;Past 3 months - No   Total # of Attempts 1   Has subject engaged in Non-Suicidal Self-Injurious Behavior? Lifetime - Yes;Past 3 months - No   Interrupted Attempt Lifetime - No;Past 3 months - No   Total # of interrupted 0   Aborted or Self-Interrupted Attempt Lifetime - Yes;Past 3 months - No   Total # of aborted or self-interrupted 1   Preparatory Acts or Behavior Lifetime - Yes;Past 3 months - No   Total # of Preparatory Acts 1   Actual Lethality/Medical Damage 0   Potential Lethality 2   Most Recent Attempt Date 05/04/24     Flo Reyes, Supervisee in Social Work  [phone]  08/13/24

## 2024-08-13 NOTE — PROGRESS NOTES
RN Shift Assessment Note 7pm-7am:    Sohail was met in the hallway where she was observed to be isolative to self. Pt was observed to be isolative to her room during the majority of this shift. Pt complained of chest pain in mid chest rating the pain 2/10. Pt stated she endorse SI with a plan to jump off the bridge. Pt stated she can be safe while on the unit. Pt denies HI, AVH.  Pt rates depression 8 and anxiety 4. Pt stated that her mood is, up and down. Pt calm and cooperative during assessment. Pt will continue to be monitored for her safety. Pt slept for a total of 4.5hrs.

## 2024-08-13 NOTE — PROGRESS NOTES
Behavioral Services  Medicare Certification Upon Admission    I certify that this patient's inpatient psychiatric hospital admission is medically necessary for:    [x] (1) Treatment which could reasonably be expected to improve this patient's condition,       [x] (2) Or for diagnostic study;     AND     [x](2) The inpatient psychiatric services are provided while the individual is under the care of a physician and are included in the individualized plan of care.    Estimated length of stay/service 5 - 7 days    Plan for post-hospital care per social work    Electronically signed by Aneesh Syed MD on 8/13/2024 at 11:54 AM

## 2024-08-13 NOTE — GROUP NOTE
Group Therapy Note    Date: 8/13/2024    Group Start Time: 1500  Group End Time: 1600  Group Topic: Recreational    [facility] ACUTE [facility]    Deidre Wilcox        Group Therapy Note    Attendees: 7       Patient's Goal:  To concentrate on selected task    Notes:  Pleasant,active participant      Discipline Responsible: Recreational Therapist      Signature:  DEIDRE WILCOX

## 2024-08-13 NOTE — DISCHARGE INSTRUCTIONS
DISCHARGE SUMMARY    NAME:Sohail Gary  : 1985  MRN: [id number]    The patient Sohail Gary exhibits the ability to control behavior in a less restrictive environment.  Patient's level of functioning is improving.  No assaultive/destructive behavior has been observed for the past 24 hours.  No suicidal/homicidal threat or behavior has been observed for the past 24 hours.  There is no evidence of serious medication side effects.  Patient has not been in physical or protective restraints for at least the past 24 hours.    If weapons involved, how are they secured? None    Is patient aware of and in agreement with discharge plan? Yes    Arrangements for medication:  Prescriptions sent to pharmacy on file    Copy of discharge instructions to provider?:  yes    Arrangements for transportation home:  Family    Keep all follow up appointments as scheduled, continue to take prescribed medications per physician instructions.  Mental health crisis number:  911 or your local mental health crisis line number at [phone]      Mental Health Emergency WARM LINE      [phone] ([phone])      M-F: 9am to 9pm      Sat & Sun: 5pm - 9pm  National suicide prevention lines:                             5-445-WNNCXLX (9-808-736-3391)       7-432-699-TALK ([phone])    Crisis Text Line:  Text HOME to 488217

## 2024-08-13 NOTE — PLAN OF CARE
Problem: Discharge Planning  Goal: Discharge to home or other facility with appropriate resources  Outcome: Progressing     Problem: Self Harm/Suicidality  Goal: Will have no self-injury during hospital stay  Description: INTERVENTIONS:  1.  Ensure constant observer at bedside with Q15M safety checks  2.  Maintain a safe environment  3.  Secure patient belongings  4.  Ensure family/visitors adhere to safety recommendations  5.  Ensure safety tray has been added to patient's diet order  6.  Every shift and PRN: Re-assess suicidal risk via Frequent Screener    8/13/2024 0448 by Julia Martinez, RN  Outcome: Progressing  Flowsheets (Taken 8/12/2024 1654 by Shahab Del Castillo, RN)  Will have no self-injury during hospital stay: Maintain a safe environment  8/12/2024 1631 by Shahab Del Castillo, RN  Outcome: Progressing     Problem: Depression  Goal: Will be euthymic at discharge  Description: INTERVENTIONS:  1. Administer medication as ordered  2. Provide emotional support via 1:1 interaction with staff  3. Encourage involvement in milieu/groups/activities  4. Monitor for social isolation  Outcome: Progressing     Problem: Behavior  Goal: Pt/Family maintain appropriate behavior and adhere to behavioral management agreement, if implemented  Description: INTERVENTIONS:  1. Assess patient/family's coping skills and  non-compliant behavior (including use of illegal substances)  2. Notify security of behavior or suspected illegal substances which indicate the need for search of the family and/or belongings  3. Encourage verbalization of thoughts and concerns in a socially appropriate manner  4. Utilize positive, consistent limit setting strategies supporting safety of patient, staff and others  5. Encourage participation in the decision making process about the behavioral management agreement  6. If a visitor's behavior poses a threat to safety call refer to organization policy.  7. Initiate consult with , Psychosocial

## 2024-08-13 NOTE — GROUP NOTE
Group Therapy Note    Date: 8/13/2024    Group Start Time: 1000  Group End Time: 1115  Group Topic: Relaxation    [facility] ACUTE BE[facility]    Deidre Wilcox        Group Therapy Note    Attendees: 7       Patient's Goal:  To participate in relaxation activity    Notes:  Pt did not attend session    Discipline Responsible: Recreational Therapist      Signature:  DEIDRE WILCOX

## 2024-08-13 NOTE — PROGRESS NOTES
[facility] Admission Pharmacy Medication Reconciliation    Information obtained from:Patient  RxQuery data available1:Yes    Comments/recommendations:  Patient reports taking Plan B x 1 last week    Medication changes (since last review):  Added  Cetirizine  naproxen  Removed  Famotidine  Ferrous sulfate  Prednisone  topiramate     1RxQuery pharmacy benefit data reflects medications filled and processed through the patient's insurance, however                this data does NOT capture whether the medication was picked up or is currently being taken by the patient.       Patient allergies:   Allergies as of 08/12/2024 - Reviewed 08/12/2024   Allergen Reaction Noted    Adhesive tape Rash 02/13/2019    Cabbage Itching and Nausea And Vomiting 11/21/2012         Prior to Admission Medications   Prescriptions Last Dose Informant   albuterol sulfate HFA (VENTOLIN HFA) 108 (90 Base) MCG/ACT inhaler 8/11/2024 Self   Sig: Inhale 2 puffs into the lungs 4 times daily as needed for Wheezing   buPROPion (WELLBUTRIN XL) 150 MG extended release tablet 8/11/2024 Self   Sig: Take 1 tablet by mouth daily   cetirizine (ZYRTEC) 10 MG tablet  Self   Sig: Take 1 tablet by mouth nightly   disulfiram (ANTABUSE) 250 MG tablet 8/11/2024 Self   Sig: Take 1 tablet by mouth daily   naproxen (EC NAPROSYN) 500 MG EC tablet  Self   Sig: Take 1 tablet by mouth 2 times daily as needed for Pain Take with food         Thank you,  Kiana Ruiz, LarisaD, BCPS

## 2024-08-13 NOTE — CARE COORDINATION
08/13/24 0848   ITP   Date of Plan 08/13/24   Date of Next Review 08/20/24   Primary Diagnosis Code Major depressive disorder, recurrent (HCC)   Barriers to Treatment Need for psychoeducation   Strengths Incorporated in Plan Acknowledging need for assistance;Community supports;Natural supports;Verbal   Plan of Care   Long Term Goal (LTG) Stated in patient/guardian terms \"to get back to my goals\"   Short Term Goal 1   Short Term Goal 1 Client will reduce frequency and intensity of unsafe behavior   Baseline Functioning Client reports using cocaine and ETOH, reports her goal is to be healthy so she can go back to the career she enjoys   Target Reduction in unsafe/reckless behavior and maladaptive coping through use of healthy coping skills/mechanisms   Objectives Other (comment)  (Client will learn healthy coping skills)   Intervention 1 Group therapy   Frequency Daily to learn, practice, and implement coping skills   Measured by Staff observation;Self report   Staff Responsible Clinical staff;[facility] staff   Intervention 2 Milieu therapy and support   Frequency Daily for peer support and socialization   Measured by Staff observation;Self report   Staff Responsible Clinical staff;[facility] staff   STG Goal 1 Status: Patient Appears to be  Partially meeting treatment plan goal  (Client presents with some anxiety regarding groups, understands/verbalizes importance of healthy coping skills)   Short Term Goal 2   Short Term Goal 2 Client will maintain compliance with medication regime   Baseline Functioning Client presents with exacerbated symptoms of mental illness/distress   Target Client will comply with and feel supported by medication regimen   Objectives Other (comment)  (Client will participate in medication assessments and report effects to tx team of scheduled and PRN medication)   Intervention 1 Monitor medications   Frequency Daily to monitor for side effects and efficacy   Measured by Staff observation;Self report

## 2024-08-14 LAB
25(OH)D3 SERPL-MCNC: 14.1 NG/ML (ref 30–100)
CHOLEST SERPL-MCNC: 147 MG/DL
EST. AVERAGE GLUCOSE BLD GHB EST-MCNC: 97 MG/DL
HBA1C MFR BLD: 5 % (ref 4–5.6)
HDLC SERPL-MCNC: 76 MG/DL
HDLC SERPL: 1.9 (ref 0–5)
HIV 1+2 AB+HIV1 P24 AG SERPL QL IA: NONREACTIVE
HIV 1/2 RESULT COMMENT: NORMAL
IRON SATN MFR SERPL: 10 % (ref 20–50)
IRON SERPL-MCNC: 36 UG/DL (ref 35–150)
LDLC SERPL CALC-MCNC: 61.4 MG/DL (ref 0–100)
T4 FREE SERPL-MCNC: 0.7 NG/DL (ref 0.8–1.5)
TIBC SERPL-MCNC: 353 UG/DL (ref 250–450)
TRIGL SERPL-MCNC: 48 MG/DL
TSH SERPL DL<=0.05 MIU/L-ACNC: 1.43 UIU/ML (ref 0.36–3.74)
VLDLC SERPL CALC-MCNC: 9.6 MG/DL

## 2024-08-14 PROCEDURE — 86235 NUCLEAR ANTIGEN ANTIBODY: CPT

## 2024-08-14 PROCEDURE — 87389 HIV-1 AG W/HIV-1&-2 AB AG IA: CPT

## 2024-08-14 PROCEDURE — 84443 ASSAY THYROID STIM HORMONE: CPT

## 2024-08-14 PROCEDURE — 6370000000 HC RX 637 (ALT 250 FOR IP): Performed by: PSYCHIATRY & NEUROLOGY

## 2024-08-14 PROCEDURE — 82746 ASSAY OF FOLIC ACID SERUM: CPT

## 2024-08-14 PROCEDURE — 80061 LIPID PANEL: CPT

## 2024-08-14 PROCEDURE — 36415 COLL VENOUS BLD VENIPUNCTURE: CPT

## 2024-08-14 PROCEDURE — 83550 IRON BINDING TEST: CPT

## 2024-08-14 PROCEDURE — 83036 HEMOGLOBIN GLYCOSYLATED A1C: CPT

## 2024-08-14 PROCEDURE — 84439 ASSAY OF FREE THYROXINE: CPT

## 2024-08-14 PROCEDURE — 1240000000 HC EMOTIONAL WELLNESS R&B

## 2024-08-14 PROCEDURE — 83540 ASSAY OF IRON: CPT

## 2024-08-14 PROCEDURE — 86225 DNA ANTIBODY NATIVE: CPT

## 2024-08-14 PROCEDURE — 82306 VITAMIN D 25 HYDROXY: CPT

## 2024-08-14 PROCEDURE — 82607 VITAMIN B-12: CPT

## 2024-08-14 RX ORDER — FLUTICASONE PROPIONATE 50 MCG
1 SPRAY, SUSPENSION (ML) NASAL DAILY
Status: DISCONTINUED | OUTPATIENT
Start: 2024-08-14 | End: 2024-08-15 | Stop reason: HOSPADM

## 2024-08-14 RX ORDER — BUPROPION HYDROCHLORIDE 150 MG/1
150 TABLET ORAL DAILY
Status: DISCONTINUED | OUTPATIENT
Start: 2024-08-14 | End: 2024-08-15 | Stop reason: HOSPADM

## 2024-08-14 RX ADMIN — BUPROPION HYDROCHLORIDE 150 MG: 150 TABLET, EXTENDED RELEASE ORAL at 17:47

## 2024-08-14 RX ADMIN — HYDROXYZINE HYDROCHLORIDE 50 MG: 25 TABLET, FILM COATED ORAL at 21:00

## 2024-08-14 RX ADMIN — FLUTICASONE PROPIONATE 1 SPRAY: 50 SPRAY, METERED NASAL at 17:47

## 2024-08-14 ASSESSMENT — PAIN SCALES - GENERAL: PAINLEVEL_OUTOF10: 0

## 2024-08-14 NOTE — GROUP NOTE
Group Therapy Note    Date: 8/14/2024    Group Start Time: 1000  Group End Time: 1100  Group Topic: Relaxation    [facility] 3 ACUTE BEHAV [facility]    Deidre Wilcox        Group Therapy Note    Attendees: 10       Patient's Goal:  To participate in relaxation activity      Status After Intervention:  Improved    Participation Level: Active Listener and Interactive    Participation Quality: Appropriate, Attentive, and Sharing      Speech:  normal      Thought Process/Content: Logical      Affective Functioning: Congruent      Level of consciousness:  Alert, Oriented x4, and Attentive      Response to Learning: Progressing to goal      Endings: None Reported    Modes of Intervention: Activity      Discipline Responsible: Recreational Therapist      Signature:  DEIDRE WILCOX

## 2024-08-14 NOTE — PROGRESS NOTES
Patient actively participated Spirituality Group about utilizing coping skills once released from here in the Behavioral Health unit.  utilized music, lyrics to favorite songs, words of encouragement and affirmation, scripture, and testimony to facilitate the group discussion and enhance group dynamics.  Rev. Joseline Locke MDiv,

## 2024-08-14 NOTE — GROUP NOTE
Group Therapy Note    Date: 8/14/2024    Group Start Time: 1500  Group End Time: 1600  Group Topic: Recreational    [facility] 3 ACUTE BEHAV TH    Deidre Wilcox        Group Therapy Note    Attendees: 9       Patient's Goal:  To concentrate on selected task    Notes:  Pt attended session,pleasant,active participant    Discipline Responsible: Recreational Therapist      Signature:  DEIDRE WILCOX

## 2024-08-14 NOTE — H&P
[address]  [address]                           HISTORY & PHYSICAL      PATIENT NAME: IVANIA DE LA O          : 1985  MED REC NO: [id number]                       ROOM: 322  ACCOUNT NO: [id number]                       ADMIT DATE: 2024  PROVIDER: Aneesh Syed MD    Psychiatric Intake Note    CHIEF COMPLAINT:  Shortness of breath and suicidal ideation.    HISTORY OF PRESENT ILLNESS:  This is a 38-year-old  female with history of HSV and fatigue comes to the hospital on multiple times this week actually to the emergency room with shortness of breath.  She notes having thoughts of as if she is going to die, she rather just may get it over with but wanting to feel better being tired, overwhelmed, mild anxiety.  Denies any auditory or visual hallucinations; actually she does not want to die, but feels terrible and wanted to know what is going on and she was seen on her labs to have anemia and ketones in her urine.  She has malnutrition.  She is a vegan and so in different ER visits, she had different abnormalities associated with what seems to be her nutritional level being low.  She had potassium was too low before it was corrected.  They sent her home.  She came back still short of breath and given albuterol.  No diagnosis of asthma or COPD, but she was definitely anemic and was not diagnosed with iron-deficiency anemia, and the rest of her labs were not even worked up for like B12 deficiencies or any other deficiencies that come with malnutrition and eating a vegan diet, not having enough protein in her diet.  She was sent to Psychiatry for management of her condition.    PAST PSYCHIATRIC HISTORY:  None.    PAST MEDICAL HISTORY:  Have admitted.    ALLERGIES:  NONE KNOWN DRUG ALLERGIES.      SOCIAL HISTORY:  Stated.    MENTAL STATUS EXAM:  Adult female, calm, cooperative.  Clear coherent speech of

## 2024-08-14 NOTE — PLAN OF CARE
Problem: Self Harm/Suicidality  Goal: Will have no self-injury during hospital stay  Description: INTERVENTIONS:  1.  Ensure constant observer at bedside with Q15M safety checks  2.  Maintain a safe environment  3.  Secure patient belongings  4.  Ensure family/visitors adhere to safety recommendations  5.  Ensure safety tray has been added to patient's diet order  6.  Every shift and PRN: Re-assess suicidal risk via Frequent Screener    Outcome: Progressing     Problem: Behavior  Goal: Pt/Family maintain appropriate behavior and adhere to behavioral management agreement, if implemented  Description: INTERVENTIONS:  1. Assess patient/family's coping skills and  non-compliant behavior (including use of illegal substances)  2. Notify security of behavior or suspected illegal substances which indicate the need for search of the family and/or belongings  3. Encourage verbalization of thoughts and concerns in a socially appropriate manner  4. Utilize positive, consistent limit setting strategies supporting safety of patient, staff and others  5. Encourage participation in the decision making process about the behavioral management agreement  6. If a visitor's behavior poses a threat to safety call refer to organization policy.  7. Initiate consult with , Psychosocial CNS, Spiritual Care as appropriate  8/14/2024 0013 by Ofelia Collins, RN  Outcome: Progressing

## 2024-08-14 NOTE — PROGRESS NOTES
GROUP THERAPY PROGRESS NOTE        GROUP TIME: 45 minutes, Wednesdays 2pm    Participants: 4    PERSONAL GOAL FOR PARTICIPATION: To be present for group, participate in discussion, and answer patient-directed questions.    GOAL ORIENTATION: Personal    THERAPEUTIC INTERVENTIONS REVIEWED AND DISCUSSED: The following topics were presented: storage of medications, how to remember to refill medications and keep up with doctor appointments, relapse prevention, keeping a record of all medication including prescription and non-prescription drugs, and who to contact with medication questions. Patients were given time to ask questions regarding their current therapy.    IMPRESSION OF PARTICIPATION: Sohail Gary was not present for medication group.      Mary Ann Carvalho [facility]

## 2024-08-15 VITALS
SYSTOLIC BLOOD PRESSURE: 117 MMHG | DIASTOLIC BLOOD PRESSURE: 65 MMHG | HEIGHT: 65 IN | RESPIRATION RATE: 18 BRPM | OXYGEN SATURATION: 100 % | BODY MASS INDEX: 21.91 KG/M2 | WEIGHT: 131.5 LBS | TEMPERATURE: 98.7 F | HEART RATE: 108 BPM

## 2024-08-15 LAB
FOLATE SERPL-MCNC: 7.9 NG/ML (ref 5–21)
VIT B12 SERPL-MCNC: 573 PG/ML (ref 193–986)

## 2024-08-15 PROCEDURE — 6370000000 HC RX 637 (ALT 250 FOR IP): Performed by: PSYCHIATRY & NEUROLOGY

## 2024-08-15 RX ORDER — FLUTICASONE PROPIONATE 50 MCG
1 SPRAY, SUSPENSION (ML) NASAL DAILY
Qty: 16 G | Refills: 3 | Status: SHIPPED | OUTPATIENT
Start: 2024-08-15

## 2024-08-15 RX ORDER — HYDROXYZINE 50 MG/1
50 TABLET, FILM COATED ORAL 3 TIMES DAILY PRN
Qty: 30 TABLET | Refills: 0 | Status: SHIPPED | OUTPATIENT
Start: 2024-08-15 | End: 2024-08-25

## 2024-08-15 RX ADMIN — BUPROPION HYDROCHLORIDE 150 MG: 150 TABLET, EXTENDED RELEASE ORAL at 09:22

## 2024-08-15 RX ADMIN — FLUTICASONE PROPIONATE 1 SPRAY: 50 SPRAY, METERED NASAL at 09:32

## 2024-08-15 ASSESSMENT — PAIN SCALES - GENERAL: PAINLEVEL_OUTOF10: 0

## 2024-08-15 NOTE — PLAN OF CARE
Problem: Behavior  Goal: Pt/Family maintain appropriate behavior and adhere to behavioral management agreement, if implemented  Description: INTERVENTIONS:  1. Assess patient/family's coping skills and  non-compliant behavior (including use of illegal substances)  2. Notify security of behavior or suspected illegal substances which indicate the need for search of the family and/or belongings  3. Encourage verbalization of thoughts and concerns in a socially appropriate manner  4. Utilize positive, consistent limit setting strategies supporting safety of patient, staff and others  5. Encourage participation in the decision making process about the behavioral management agreement  6. If a visitor's behavior poses a threat to safety call refer to organization policy.  7. Initiate consult with , Psychosocial CNS, Spiritual Care as appropriate  Outcome: Progressing     Problem: Self Harm/Suicidality  Goal: Will have no self-injury during hospital stay  Description: INTERVENTIONS:  1.  Ensure constant observer at bedside with Q15M safety checks  2.  Maintain a safe environment  3.  Secure patient belongings  4.  Ensure family/visitors adhere to safety recommendations  5.  Ensure safety tray has been added to patient's diet order  6.  Every shift and PRN: Re-assess suicidal risk via Frequent Screener    Outcome: Progressing

## 2024-08-15 NOTE — PLAN OF CARE
Problem: Discharge Planning  Goal: Discharge to home or other facility with appropriate resources  Outcome: Progressing     Problem: Self Harm/Suicidality  Goal: Will have no self-injury during hospital stay  Description: INTERVENTIONS:  1.  Ensure constant observer at bedside with Q15M safety checks  2.  Maintain a safe environment  3.  Secure patient belongings  4.  Ensure family/visitors adhere to safety recommendations  5.  Ensure safety tray has been added to patient's diet order  6.  Every shift and PRN: Re-assess suicidal risk via Frequent Screener    8/15/2024 0758 by Winter Salomon RN  Outcome: Progressing  8/15/2024 0022 by Ofelia Collins RN  Outcome: Progressing     Problem: Depression  Goal: Will be euthymic at discharge  Description: INTERVENTIONS:  1. Administer medication as ordered  2. Provide emotional support via 1:1 interaction with staff  3. Encourage involvement in milieu/groups/activities  4. Monitor for social isolation  Outcome: Progressing     Problem: Behavior  Goal: Pt/Family maintain appropriate behavior and adhere to behavioral management agreement, if implemented  Description: INTERVENTIONS:  1. Assess patient/family's coping skills and  non-compliant behavior (including use of illegal substances)  2. Notify security of behavior or suspected illegal substances which indicate the need for search of the family and/or belongings  3. Encourage verbalization of thoughts and concerns in a socially appropriate manner  4. Utilize positive, consistent limit setting strategies supporting safety of patient, staff and others  5. Encourage participation in the decision making process about the behavioral management agreement  6. If a visitor's behavior poses a threat to safety call refer to organization policy.  7. Initiate consult with , Psychosocial CNS, Spiritual Care as appropriate  8/15/2024 0758 by Winter Salomon RN  Outcome: Progressing  8/15/2024 0022 by Dennis

## 2024-08-15 NOTE — PROGRESS NOTES
Pharmacist Discharge Medication Reconciliation    Discharging Provider: Kunal       Discharge Medications:   Current Discharge Medication List        START taking these medications    Details   hydrOXYzine HCl (ATARAX) 50 MG tablet Take 1 tablet by mouth 3 times daily as needed for Anxiety  Qty: 30 tablet, Refills: 0      fluticasone (FLONASE) 50 MCG/ACT nasal spray 1 spray by Each Nostril route daily  Qty: 16 g, Refills: 3           CONTINUE these medications which have NOT CHANGED    Details   cetirizine (ZYRTEC) 10 MG tablet Take 1 tablet by mouth nightly      naproxen (EC NAPROSYN) 500 MG EC tablet Take 1 tablet by mouth 2 times daily as needed for Pain Take with food      albuterol sulfate HFA (VENTOLIN HFA) 108 (90 Base) MCG/ACT inhaler Inhale 2 puffs into the lungs 4 times daily as needed for Wheezing  Qty: 54 g, Refills: 1      buPROPion (WELLBUTRIN XL) 150 MG extended release tablet Take 1 tablet by mouth daily  Qty: 30 tablet, Refills: 1      disulfiram (ANTABUSE) 250 MG tablet Take 1 tablet by mouth daily  Qty: 30 tablet, Refills: 1             The patient's chart, MAR and AVS were reviewed by Mary Ann Carvalho RPH.

## 2024-08-15 NOTE — DISCHARGE SUMMARY
PSYCHIATRIC DISCHARGE SUMMARY         IDENTIFICATION:    Patient Name  Sohail Gary   Date of Birth 1985   [facility] [id number]   Medical Record Number  [id number]      Age  38 y.o.   PCP No primary care provider on file.   Admit date:  8/12/2024    Discharge date: 8/15/2024   Room Number  322/01  @ [facility]   Date of Service  8/15/2024            TYPE OF DISCHARGE: REGULAR               CONDITION AT DISCHARGE: Improved       PROVISIONAL & DISCHARGE DIAGNOSES:        Active Hospital Problems    *Major depressive disorder, recurrent (HCC)        DISCHARGE DIAGNOSIS:   Axis I:  SEE ABOVE  Axis II: SEE ABOVE  Axis III: SEE ABOVE  Axis IV:  lack of structure  Axis V:  <50 on admission, 55+ on discharge     CC & HISTORY OF PRESENT ILLNESS:  38-year-old  female with history of HSV and fatigue comes to the hospital on multiple times this week actually to the emergency room with shortness of breath.  She notes having thoughts of as if she is going to die, she rather just may get it over with but wanting to feel better being tired, overwhelmed, mild anxiety.  Denies any auditory or visual hallucinations; actually she does not want to die, but feels terrible and wanted to know what is going on and she was seen on her labs to have anemia and ketones in her urine.  She has malnutrition.  She is a vegan and so in different ER visits, she had different abnormalities associated with what seems to be her nutritional level being low.  She had potassium was too low before it was corrected.  They sent her home.  She came back still short of breath and given albuterol.  No diagnosis of asthma or COPD, but she was definitely anemic and was not diagnosed with iron-deficiency anemia, and the rest of her labs were not even worked up for like B12 deficiencies or any other deficiencies that come with malnutrition and eating a vegan diet, not having enough protein in her diet.  She was sent to
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
## 2024-08-15 NOTE — PLAN OF CARE
Problem: Discharge Planning  Goal: Discharge to home or other facility with appropriate resources  8/15/2024 1327 by Winter Salomon RN  Outcome: Adequate for Discharge  8/15/2024 0758 by Winter Salomon RN  Outcome: Progressing     Problem: Self Harm/Suicidality  Goal: Will have no self-injury during hospital stay  Description: INTERVENTIONS:  1.  Ensure constant observer at bedside with Q15M safety checks  2.  Maintain a safe environment  3.  Secure patient belongings  4.  Ensure family/visitors adhere to safety recommendations  5.  Ensure safety tray has been added to patient's diet order  6.  Every shift and PRN: Re-assess suicidal risk via Frequent Screener    8/15/2024 1327 by Winter Salomon RN  Outcome: Adequate for Discharge  8/15/2024 0758 by Winter Salomon RN  Outcome: Progressing  8/15/2024 0022 by Ofelia Collins RN  Outcome: Progressing     Problem: Depression  Goal: Will be euthymic at discharge  Description: INTERVENTIONS:  1. Administer medication as ordered  2. Provide emotional support via 1:1 interaction with staff  3. Encourage involvement in milieu/groups/activities  4. Monitor for social isolation  8/15/2024 1327 by Winter Salomon RN  Outcome: Adequate for Discharge  8/15/2024 0758 by Winter Salomon RN  Outcome: Progressing     Problem: Behavior  Goal: Pt/Family maintain appropriate behavior and adhere to behavioral management agreement, if implemented  Description: INTERVENTIONS:  1. Assess patient/family's coping skills and  non-compliant behavior (including use of illegal substances)  2. Notify security of behavior or suspected illegal substances which indicate the need for search of the family and/or belongings  3. Encourage verbalization of thoughts and concerns in a socially appropriate manner  4. Utilize positive, consistent limit setting strategies supporting safety of patient, staff and others  5. Encourage participation in the decision making process about

## 2024-08-15 NOTE — GROUP NOTE
Group Therapy Note    Date: 8/15/2024    Group Start Time: 1000  Group End Time: 1115  Group Topic: Topic Group    [facility] 3 ACUTE BEHAV [facility]    Deidre Wilcox        Group Therapy Note    Attendees: 8       Patient's Goal:  To participate in anger management bingo game      Status After Intervention:  Improved    Participation Level: Active Listener and Interactive    Participation Quality: Appropriate, Attentive, and Sharing      Speech:  normal      Thought Process/Content: Logical      Affective Functioning: Congruent      Level of consciousness:  Alert, Oriented x4, and Attentive      Response to Learning: Progressing to goal      Endings: None Reported    Modes of Intervention: Education and Activity      Discipline Responsible: Recreational Therapist      Signature:  DEIDRE WILCOX

## 2024-08-16 LAB
CENTROMERE B AB SER-ACNC: <0.2 AI (ref 0–0.9)
CHROMATIN AB SERPL-ACNC: <0.2 AI (ref 0–0.9)
DSDNA AB SER-ACNC: <1 IU/ML (ref 0–9)
ENA JO1 AB SER-ACNC: <0.2 AI (ref 0–0.9)
ENA RNP AB SER-ACNC: <0.2 AI (ref 0–0.9)
ENA SCL70 AB SER-ACNC: <0.2 AI (ref 0–0.9)
ENA SM AB SER-ACNC: <0.2 AI (ref 0–0.9)
ENA SS-A AB SER-ACNC: <0.2 AI (ref 0–0.9)
ENA SS-B AB SER-ACNC: <0.2 AI (ref 0–0.9)
Lab: NORMAL

## 2024-08-18 ENCOUNTER — APPOINTMENT (OUTPATIENT)
Facility: HOSPITAL | Age: 39
End: 2024-08-18
Payer: MEDICAID

## 2024-08-18 ENCOUNTER — HOSPITAL ENCOUNTER (EMERGENCY)
Facility: HOSPITAL | Age: 39
Discharge: HOME | End: 2024-08-18
Attending: EMERGENCY MEDICINE
Payer: MEDICAID

## 2024-08-18 VITALS
HEART RATE: 85 BPM | DIASTOLIC BLOOD PRESSURE: 84 MMHG | SYSTOLIC BLOOD PRESSURE: 116 MMHG | RESPIRATION RATE: 16 BRPM | OXYGEN SATURATION: 100 % | TEMPERATURE: 98.6 F

## 2024-08-18 DIAGNOSIS — R00.0 TACHYCARDIA: ICD-10-CM

## 2024-08-18 DIAGNOSIS — F41.1 ANXIETY STATE: Primary | ICD-10-CM

## 2024-08-18 LAB
ALBUMIN SERPL-MCNC: 3.7 G/DL (ref 3.5–5)
ALBUMIN/GLOB SERPL: 0.9 (ref 1.1–2.2)
ALP SERPL-CCNC: 92 U/L (ref 45–117)
ALT SERPL-CCNC: 15 U/L (ref 12–78)
AMPHET UR QL SCN: POSITIVE
ANION GAP SERPL CALC-SCNC: 12 MMOL/L (ref 5–15)
APPEARANCE UR: CLEAR
AST SERPL-CCNC: 10 U/L (ref 15–37)
BACTERIA URNS QL MICRO: NEGATIVE /HPF
BARBITURATES UR QL SCN: NEGATIVE
BASOPHILS # BLD: 0 K/UL (ref 0–0.1)
BASOPHILS NFR BLD: 0 % (ref 0–1)
BENZODIAZ UR QL: NEGATIVE
BILIRUB SERPL-MCNC: 0.2 MG/DL (ref 0.2–1)
BILIRUB UR QL: NEGATIVE
BUN SERPL-MCNC: 10 MG/DL (ref 6–20)
BUN/CREAT SERPL: 14 (ref 12–20)
CALCIUM SERPL-MCNC: 8.9 MG/DL (ref 8.5–10.1)
CANNABINOIDS UR QL SCN: NEGATIVE
CHLORIDE SERPL-SCNC: 102 MMOL/L (ref 97–108)
CO2 SERPL-SCNC: 27 MMOL/L (ref 21–32)
COCAINE UR QL SCN: NEGATIVE
COLOR UR: ABNORMAL
CREAT SERPL-MCNC: 0.74 MG/DL (ref 0.55–1.02)
DIFFERENTIAL METHOD BLD: ABNORMAL
EOSINOPHIL # BLD: 0 K/UL (ref 0–0.4)
EOSINOPHIL NFR BLD: 0 % (ref 0–7)
EPITH CASTS URNS QL MICRO: ABNORMAL /LPF
ERYTHROCYTE [DISTWIDTH] IN BLOOD BY AUTOMATED COUNT: 15.1 % (ref 11.5–14.5)
ETHANOL SERPL-MCNC: <10 MG/DL (ref 0–0.08)
GLOBULIN SER CALC-MCNC: 4.1 G/DL (ref 2–4)
GLUCOSE SERPL-MCNC: 158 MG/DL (ref 65–100)
GLUCOSE UR STRIP.AUTO-MCNC: NEGATIVE MG/DL
HCG UR QL: NEGATIVE
HCT VFR BLD AUTO: 32.3 % (ref 35–47)
HGB BLD-MCNC: 10.5 G/DL (ref 11.5–16)
HGB UR QL STRIP: NEGATIVE
IMM GRANULOCYTES # BLD AUTO: 0 K/UL (ref 0–0.04)
IMM GRANULOCYTES NFR BLD AUTO: 0 % (ref 0–0.5)
KETONES UR QL STRIP.AUTO: ABNORMAL MG/DL
LEUKOCYTE ESTERASE UR QL STRIP.AUTO: NEGATIVE
LIPASE SERPL-CCNC: 45 U/L (ref 13–75)
LYMPHOCYTES # BLD: 1.4 K/UL (ref 0.8–3.5)
LYMPHOCYTES NFR BLD: 22 % (ref 12–49)
Lab: ABNORMAL
MCH RBC QN AUTO: 28 PG (ref 26–34)
MCHC RBC AUTO-ENTMCNC: 32.5 G/DL (ref 30–36.5)
MCV RBC AUTO: 86.1 FL (ref 80–99)
METHADONE UR QL: NEGATIVE
MONOCYTES # BLD: 0.5 K/UL (ref 0–1)
MONOCYTES NFR BLD: 7 % (ref 5–13)
NEUTS SEG # BLD: 4.4 K/UL (ref 1.8–8)
NEUTS SEG NFR BLD: 70 % (ref 32–75)
NITRITE UR QL STRIP.AUTO: NEGATIVE
NRBC # BLD: 0 K/UL (ref 0–0.01)
NRBC BLD-RTO: 0 PER 100 WBC
OPIATES UR QL: NEGATIVE
PCP UR QL: NEGATIVE
PH UR STRIP: 6.5 (ref 5–8)
PLATELET # BLD AUTO: 448 K/UL (ref 150–400)
PMV BLD AUTO: 9.1 FL (ref 8.9–12.9)
POTASSIUM SERPL-SCNC: 3.7 MMOL/L (ref 3.5–5.1)
PROT SERPL-MCNC: 7.8 G/DL (ref 6.4–8.2)
PROT UR STRIP-MCNC: NEGATIVE MG/DL
RBC # BLD AUTO: 3.75 M/UL (ref 3.8–5.2)
RBC #/AREA URNS HPF: ABNORMAL /HPF (ref 0–5)
SODIUM SERPL-SCNC: 141 MMOL/L (ref 136–145)
SP GR UR REFRACTOMETRY: 1.02
UROBILINOGEN UR QL STRIP.AUTO: 1 EU/DL (ref 0.2–1)
WBC # BLD AUTO: 6.3 K/UL (ref 3.6–11)
WBC URNS QL MICRO: ABNORMAL /HPF (ref 0–4)

## 2024-08-18 PROCEDURE — 6360000002 HC RX W HCPCS: Performed by: EMERGENCY MEDICINE

## 2024-08-18 PROCEDURE — 71275 CT ANGIOGRAPHY CHEST: CPT

## 2024-08-18 PROCEDURE — 82077 ASSAY SPEC XCP UR&BREATH IA: CPT

## 2024-08-18 PROCEDURE — 83690 ASSAY OF LIPASE: CPT

## 2024-08-18 PROCEDURE — 81025 URINE PREGNANCY TEST: CPT

## 2024-08-18 PROCEDURE — 6360000004 HC RX CONTRAST MEDICATION: Performed by: EMERGENCY MEDICINE

## 2024-08-18 PROCEDURE — 80053 COMPREHEN METABOLIC PANEL: CPT

## 2024-08-18 PROCEDURE — 96374 THER/PROPH/DIAG INJ IV PUSH: CPT

## 2024-08-18 PROCEDURE — 93005 ELECTROCARDIOGRAM TRACING: CPT | Performed by: EMERGENCY MEDICINE

## 2024-08-18 PROCEDURE — 80307 DRUG TEST PRSMV CHEM ANLYZR: CPT

## 2024-08-18 PROCEDURE — 36415 COLL VENOUS BLD VENIPUNCTURE: CPT

## 2024-08-18 PROCEDURE — 99285 EMERGENCY DEPT VISIT HI MDM: CPT

## 2024-08-18 PROCEDURE — 85025 COMPLETE CBC W/AUTO DIFF WBC: CPT

## 2024-08-18 PROCEDURE — 81001 URINALYSIS AUTO W/SCOPE: CPT

## 2024-08-18 PROCEDURE — 2580000003 HC RX 258: Performed by: EMERGENCY MEDICINE

## 2024-08-18 RX ORDER — LORAZEPAM 2 MG/ML
0.5 INJECTION INTRAMUSCULAR EVERY 6 HOURS PRN
Status: DISCONTINUED | OUTPATIENT
Start: 2024-08-18 | End: 2024-08-18 | Stop reason: HOSPADM

## 2024-08-18 RX ORDER — 0.9 % SODIUM CHLORIDE 0.9 %
1000 INTRAVENOUS SOLUTION INTRAVENOUS ONCE
Status: COMPLETED | OUTPATIENT
Start: 2024-08-18 | End: 2024-08-18

## 2024-08-18 RX ORDER — ALPRAZOLAM 0.25 MG
0.25 TABLET ORAL NIGHTLY PRN
Qty: 5 TABLET | Refills: 0 | Status: SHIPPED | OUTPATIENT
Start: 2024-08-18 | End: 2024-08-25

## 2024-08-18 RX ORDER — IOPAMIDOL 755 MG/ML
100 INJECTION, SOLUTION INTRAVASCULAR
Status: COMPLETED | OUTPATIENT
Start: 2024-08-18 | End: 2024-08-18

## 2024-08-18 RX ADMIN — IOPAMIDOL 100 ML: 755 INJECTION, SOLUTION INTRAVENOUS at 19:21

## 2024-08-18 RX ADMIN — LORAZEPAM 0.5 MG: 2 INJECTION INTRAMUSCULAR; INTRAVENOUS at 18:46

## 2024-08-18 RX ADMIN — SODIUM CHLORIDE 1000 ML: 9 INJECTION, SOLUTION INTRAVENOUS at 18:46

## 2024-08-18 RX ADMIN — SODIUM CHLORIDE 1000 ML: 9 INJECTION, SOLUTION INTRAVENOUS at 17:21

## 2024-08-18 ASSESSMENT — PAIN DESCRIPTION - ORIENTATION: ORIENTATION: LEFT;UPPER

## 2024-08-18 ASSESSMENT — PAIN SCALES - GENERAL
PAINLEVEL_OUTOF10: 0
PAINLEVEL_OUTOF10: 7

## 2024-08-18 ASSESSMENT — LIFESTYLE VARIABLES
HOW OFTEN DO YOU HAVE A DRINK CONTAINING ALCOHOL: NEVER
HOW MANY STANDARD DRINKS CONTAINING ALCOHOL DO YOU HAVE ON A TYPICAL DAY: PATIENT DOES NOT DRINK

## 2024-08-18 ASSESSMENT — PAIN DESCRIPTION - LOCATION: LOCATION: CHEST

## 2024-08-18 ASSESSMENT — PAIN DESCRIPTION - DESCRIPTORS: DESCRIPTORS: SHARP

## 2024-08-18 NOTE — ED TRIAGE NOTES
Feels as though heart rate has been racing since yesterday.  Highest today with exertion was 170 getting out of the car.  No cardiac history.  History or anxiety.  Sitting in chair of waiting area it was 130's.

## 2024-08-18 NOTE — ED NOTES
Patient has been instructed that they have been given Ativan* which contains opioids, benzodiazepines, or other sedating drugs. Patient is aware that they will need to refrain from driving or operating heavy machinery after taking this medication. Patient also instructed that they need to avoid drinking alcohol and using other products containing opioids, benzodiazepines, or other sedating drugs. Patient verbalized understanding.

## 2024-08-18 NOTE — ED NOTES
Pt presents ambulatory to ED complaining of CP, SOB, palpations x 1 day. Pt reports she has been monitoring her HR with her personal SpO2 monitor from home. Pt reports her HR was 170 today and was consistently elevated PTA.    Patient is A&Ox4, follows commands. Pt appears anxious, speech is rapid. Pt appears fearful. Pt RR is elevated and shallow. Skin is warm, dry, intact.     Patient is resting on stretcher. Bed in lowest locked position. Call bell at bedside. Patient and family updated on care plan.      Emergency Department Nursing Plan of Care The Nursing Plan of Care is developed from the Nursing assessment and Emergency Department Attending provider initial evaluation. The plan of care may be reviewed in the “ED Provider note”.     The Plan of Care was developed with the following considerations:  Patient / Family readiness to learn indicated by:verbalized understanding, successful return demonstration, and appropriate questions asked  Persons(s) to be included in education: patient  Barriers to Learning/Limitations:None    Signed    Flo Espinoza RN   8/18/2024   5:00 PM

## 2024-08-18 NOTE — ED PROVIDER NOTES
[facility] EMERGENCY DEPT  EMERGENCY DEPARTMENT ENCOUNTER       Pt Name: Sohail Gary  MRN: [id number]  Birthdate 1985  Date of evaluation: 2024  Provider: Marixa Cyr MD   PCP: No primary care provider on file.  Note Started: 8:29 AM 24     (Please note that parts of this dictation were completed with voice recognition software. Quite often unanticipated grammatical, syntax, homophones, and other interpretive errors are inadvertently transcribed by the computer software. Please disregards these errors. Please excuse any errors that have escaped final proofreading.)    CHIEF COMPLAINT       Chief Complaint   Patient presents with    Tachycardia        HISTORY OF PRESENT ILLNESS: 1 or more elements       History From: patient, History limited by:  none     Sohail Gary is a 38 y.o. female who presents ambulatory to the ED complaining of fast heart rate.  She noticed it 2 days ago, yesterday, again today, but today it seemed to be getting faster.  States she went to 170s when she was getting out of the car to walk to the ED.  Describes associated mild chest pain and shortness of breath.  Feels dyspneic when she talks.  Patient denies cigarette smoking, recent travel, recent surgery, recent trauma, prior PE or DVT, known cancer, known pregnancy.  She was recently admitted to the psych unit.  She does states she was diagnosed with a pulmonary nodule last week at [facility].  Evidently she had an elevated D-dimer and a chest CT then, but she was not tachycardic at that point.     Nursing Notes were all reviewed and agreed with or any disagreements were addressed in the HPI.     REVIEW OF SYSTEMS        Positives and Pertinent negatives as per HPI.    PAST HISTORY     Past Medical History:  Past Medical History:   Diagnosis Date    Other ill-defined conditions(799.89)     HSV       Past Surgical History:  Past Surgical History:   Procedure Laterality Date     SECTION         Family History:  No  ill-appearing, toxic-appearing or diaphoretic.   Cardiovascular:      Rate and Rhythm: Tachycardia present.   Pulmonary:      Effort: Pulmonary effort is normal.   Musculoskeletal:         General: Normal range of motion.   Skin:     General: Skin is warm and dry.   Neurological:      General: No focal deficit present.   Psychiatric:         Mood and Affect: Mood is anxious.          DIAGNOSTIC RESULTS   LABS:    No results found for this or any previous visit (from the past 24 hour(s)).      EKG: If performed, independent interpretation documented below in the ED course or MDM section     RADIOLOGY:  Non-plain film images such as CT, Ultrasound and MRI are read by the radiologist. Plain radiographic images are visualized and preliminarily interpreted by the ED Provider with the findings documented in the MDM section.     Interpretation per the Radiologist below, if available at the time of this note:     CTA CHEST W WO CONTRAST PE Eval   Final Result   There is no pulmonary embolism.   There is no aortic aneurysm or dissection.   No acute intrathoracic process is identified. Incidental findings are as   described above.              Electronically signed by RANDALL SR             PROCEDURES   Unless otherwise noted below, none  Procedures       EMERGENCY DEPARTMENT COURSE and DIFFERENTIAL DIAGNOSIS/MDM   Vitals:    Vitals:    08/18/24 1906 08/18/24 2038 08/18/24 2039 08/18/24 2045   BP:    116/84   Pulse: (!) 105 94 94 85   Resp: 18 16 17 16   Temp:    98.6 °F (37 °C)   TempSrc:    Oral   SpO2: 100% 100% 100% 100%        Patient was given the following medications:  Medications   sodium chloride 0.9 % bolus 1,000 mL (0 mLs IntraVENous Stopped 8/18/24 2059)   sodium chloride 0.9 % bolus 1,000 mL (0 mLs IntraVENous Stopped 8/18/24 2056)   iopamidol (ISOVUE-370) 76 % injection 100 mL (100 mLs IntraVENous Given 8/18/24 1921)       Medical Decision Making  Needs to be ruled out for PE given dyspnea and tachycardia.   by mouth daily     fluticasone 50 MCG/ACT nasal spray  Commonly known as: FLONASE  1 spray by Each Nostril route daily     hydrOXYzine HCl 50 MG tablet  Commonly known as: ATARAX  Take 1 tablet by mouth 3 times daily as needed for Anxiety     naproxen 500 MG EC tablet  Commonly known as: EC NAPROSYN               Where to Get Your Medications        These medications were sent to [facility]/pharmacy #[id number] - [place], [place] - [address] - P [phone] - F [phone] [address] [address]      Phone: [phone]   ALPRAZolam 0.25 MG tablet           DISCONTINUED MEDICATIONS:  Discharge Medication List as of 8/18/2024  8:41 PM          I am the Primary Clinician of Record.   Marixa Cyr MD (electronically signed)              Marixa Cyr MD  08/25/24 0180

## 2024-08-18 NOTE — ED NOTES
Verbal shift change report given to Kirti RN (oncoming nurse) by Flo RN (offgoing nurse). Report included the following information Nurse Handoff Report, ED Encounter Summary, ED SBAR, Adult Overview, Intake/Output, MAR, Recent Results, Cardiac Rhythm Sinus Tach, and Event Log.

## 2024-08-19 LAB
EKG ATRIAL RATE: 127 BPM
EKG DIAGNOSIS: NORMAL
EKG P AXIS: 77 DEGREES
EKG P-R INTERVAL: 160 MS
EKG Q-T INTERVAL: 298 MS
EKG QRS DURATION: 78 MS
EKG QTC CALCULATION (BAZETT): 433 MS
EKG R AXIS: 72 DEGREES
EKG T AXIS: 68 DEGREES
EKG VENTRICULAR RATE: 127 BPM

## 2024-08-19 PROCEDURE — 93010 ELECTROCARDIOGRAM REPORT: CPT | Performed by: INTERNAL MEDICINE

## 2024-08-19 NOTE — ED NOTES
Pt alert, oriented, and ambulated without difficulty. Pt verbalizes understanding of DC instructions. All belongings with patient at this time.

## 2024-08-30 ENCOUNTER — HOSPITAL ENCOUNTER (EMERGENCY)
Facility: HOSPITAL | Age: 39
Discharge: HOME | End: 2024-08-30
Attending: EMERGENCY MEDICINE
Payer: MEDICAID

## 2024-08-30 VITALS
HEIGHT: 65 IN | HEART RATE: 81 BPM | RESPIRATION RATE: 18 BRPM | SYSTOLIC BLOOD PRESSURE: 114 MMHG | DIASTOLIC BLOOD PRESSURE: 72 MMHG | OXYGEN SATURATION: 100 % | WEIGHT: 135 LBS | BODY MASS INDEX: 22.49 KG/M2 | TEMPERATURE: 98.5 F

## 2024-08-30 DIAGNOSIS — R07.89 ATYPICAL CHEST PAIN: Primary | ICD-10-CM

## 2024-08-30 LAB
ALBUMIN SERPL-MCNC: 3.7 G/DL (ref 3.5–5)
ALBUMIN/GLOB SERPL: 0.9 (ref 1.1–2.2)
ALP SERPL-CCNC: 72 U/L (ref 45–117)
ALT SERPL-CCNC: 22 U/L (ref 12–78)
ANION GAP SERPL CALC-SCNC: 10 MMOL/L (ref 5–15)
AST SERPL-CCNC: 13 U/L (ref 15–37)
BASOPHILS # BLD: 0 K/UL (ref 0–0.1)
BASOPHILS NFR BLD: 0 % (ref 0–1)
BILIRUB SERPL-MCNC: 0.2 MG/DL (ref 0.2–1)
BUN SERPL-MCNC: 12 MG/DL (ref 6–20)
BUN/CREAT SERPL: 17 (ref 12–20)
CALCIUM SERPL-MCNC: 9.1 MG/DL (ref 8.5–10.1)
CHLORIDE SERPL-SCNC: 103 MMOL/L (ref 97–108)
CO2 SERPL-SCNC: 25 MMOL/L (ref 21–32)
CREAT SERPL-MCNC: 0.7 MG/DL (ref 0.55–1.02)
DIFFERENTIAL METHOD BLD: ABNORMAL
EKG ATRIAL RATE: 77 BPM
EKG DIAGNOSIS: NORMAL
EKG P AXIS: 61 DEGREES
EKG P-R INTERVAL: 150 MS
EKG Q-T INTERVAL: 372 MS
EKG QRS DURATION: 80 MS
EKG QTC CALCULATION (BAZETT): 420 MS
EKG R AXIS: 70 DEGREES
EKG T AXIS: 70 DEGREES
EKG VENTRICULAR RATE: 77 BPM
EOSINOPHIL # BLD: 0 K/UL (ref 0–0.4)
EOSINOPHIL NFR BLD: 0 % (ref 0–7)
ERYTHROCYTE [DISTWIDTH] IN BLOOD BY AUTOMATED COUNT: 15.8 % (ref 11.5–14.5)
GLOBULIN SER CALC-MCNC: 4 G/DL (ref 2–4)
GLUCOSE SERPL-MCNC: 99 MG/DL (ref 65–100)
HCT VFR BLD AUTO: 30.7 % (ref 35–47)
HGB BLD-MCNC: 9.5 G/DL (ref 11.5–16)
IMM GRANULOCYTES # BLD AUTO: 0 K/UL (ref 0–0.04)
IMM GRANULOCYTES NFR BLD AUTO: 0 % (ref 0–0.5)
LYMPHOCYTES # BLD: 1.1 K/UL (ref 0.8–3.5)
LYMPHOCYTES NFR BLD: 9 % (ref 12–49)
MCH RBC QN AUTO: 28.1 PG (ref 26–34)
MCHC RBC AUTO-ENTMCNC: 30.9 G/DL (ref 30–36.5)
MCV RBC AUTO: 90.8 FL (ref 80–99)
MONOCYTES # BLD: 0.6 K/UL (ref 0–1)
MONOCYTES NFR BLD: 5 % (ref 5–13)
NEUTS SEG # BLD: 10.2 K/UL (ref 1.8–8)
NEUTS SEG NFR BLD: 85 % (ref 32–75)
NRBC # BLD: 0 K/UL (ref 0–0.01)
NRBC BLD-RTO: 0 PER 100 WBC
PLATELET # BLD AUTO: 364 K/UL (ref 150–400)
PMV BLD AUTO: 9.4 FL (ref 8.9–12.9)
POTASSIUM SERPL-SCNC: 3.8 MMOL/L (ref 3.5–5.1)
PROT SERPL-MCNC: 7.7 G/DL (ref 6.4–8.2)
RBC # BLD AUTO: 3.38 M/UL (ref 3.8–5.2)
SODIUM SERPL-SCNC: 138 MMOL/L (ref 136–145)
TROPONIN I SERPL HS-MCNC: <4 NG/L (ref 0–51)
WBC # BLD AUTO: 12 K/UL (ref 3.6–11)

## 2024-08-30 PROCEDURE — 36415 COLL VENOUS BLD VENIPUNCTURE: CPT

## 2024-08-30 PROCEDURE — 93005 ELECTROCARDIOGRAM TRACING: CPT

## 2024-08-30 PROCEDURE — 85025 COMPLETE CBC W/AUTO DIFF WBC: CPT

## 2024-08-30 PROCEDURE — 99284 EMERGENCY DEPT VISIT MOD MDM: CPT

## 2024-08-30 PROCEDURE — 80053 COMPREHEN METABOLIC PANEL: CPT

## 2024-08-30 PROCEDURE — 84484 ASSAY OF TROPONIN QUANT: CPT

## 2024-08-30 ASSESSMENT — HEART SCORE: ECG: NORMAL

## 2024-08-30 ASSESSMENT — PAIN - FUNCTIONAL ASSESSMENT: PAIN_FUNCTIONAL_ASSESSMENT: 0-10

## 2024-08-30 ASSESSMENT — PAIN SCALES - GENERAL: PAINLEVEL_OUTOF10: 4

## 2024-08-30 NOTE — ED PROVIDER NOTES
[facility] EMERGENCY DEPT  EMERGENCY DEPARTMENT ENCOUNTER       Pt Name: Sohail Gary  MRN: [id number]  Birthdate 1985  Date of evaluation: 2024  Provider: Ami Alvarez MD   PCP: No primary care provider on file.  Note Started: 1:55 AM EDT 24     CHIEF COMPLAINT       Chief Complaint   Patient presents with    Chest Pain        HISTORY OF PRESENT ILLNESS: 1 or more elements      History From: patient, History limited by: none     Sohail Gary is a 38 y.o. female who presents with a cc of chest pain       Please See MDM for Additional Details of the HPI/PMH  Nursing Notes were all reviewed and agreed with or any disagreements were addressed in the HPI.     REVIEW OF SYSTEMS        Positives and Pertinent negatives as per HPI.    PAST HISTORY     Past Medical History:  Past Medical History:   Diagnosis Date    Other ill-defined conditions(799.89)     HSV       Past Surgical History:  Past Surgical History:   Procedure Laterality Date     SECTION         Family History:  No family history on file.    Social History:  Social History     Tobacco Use    Smoking status: Never    Smokeless tobacco: Never   Substance Use Topics    Alcohol use: Not Currently    Drug use: Yes     Types: Cocaine, Marijuana (Weed)     Comment: cocaine, 2024, [place]       Allergies:  Allergies   Allergen Reactions    Adhesive Tape Rash    Cabbage Itching and Nausea And Vomiting       CURRENT MEDICATIONS      Discharge Medication List as of 2024  1:59 AM        CONTINUE these medications which have NOT CHANGED    Details   fluticasone (FLONASE) 50 MCG/ACT nasal spray 1 spray by Each Nostril route daily, Disp-16 g, R-3Normal      cetirizine (ZYRTEC) 10 MG tablet Take 1 tablet by mouth nightlyHistorical Med      naproxen (EC NAPROSYN) 500 MG EC tablet Take 1 tablet by mouth 2 times daily as needed for Pain Take with foodHistorical Med      albuterol sulfate HFA (VENTOLIN HFA) 108 (90 Base) MCG/ACT inhaler

## 2024-08-30 NOTE — ED NOTES
Discharge instructions were given to the patient by jose reyes.     The patient left the Emergency Department alert and oriented and in no acute distress with 0 prescriptions. The patient was encouraged to call or return to the ED for worsening issues or problems and was encouraged to schedule a follow up appointment for continuing care.     Ambulation assessment completed before discharge.  Pt left Emergency Department ambulating at baseline with no ortho devices  Ortho device education: none    The patient verbalized understanding of discharge instructions and prescriptions, all questions were answered. The patient has no further concerns at this time.

## 2024-08-30 NOTE — ED TRIAGE NOTES
Pt reports to ED w. Complaints dull intermittent chest pain x today.  Pt states \"feels like my heart skips a beat.''  Pt has cardiology appt tomorrow.  Pt has on an MCOT monitor given to her from [facility].  Pt also states has a HX of anxiety and taking meds.  Pt reports also being prescribed propranolol but not compliant.  Pt states her HR gets high when she does activity.  Pt states has also been to [facility] 2 days ago for same symptoms.      Pt HR 81 during triage

## 2024-08-30 NOTE — ED NOTES
Pt presents to the ED with c/o mid sternal chest pain x 3 weeks. Pt has been in and out of hospitals x 3 weeks for same thing. Pt has a heart monitor on. Reports her heart rate has been elevated and feeling fatigued.  Reports being seen at [facility] 2 days ago for same and had blood work, scans and echo done. Reports not taking her beta blocker as prescribed due to it giving her \"weird side effects.\" Pt has follow up appointment with [facility] today.     Pt is alert, oriented and appropriate.     Emergency Department Nursing Plan of Care      The Nursing Plan of Care is developed from the Nursing assessment and Emergency Department Attending provider initial evaluation.  The plan of care may be reviewed in the “ED Provider note”.    The Plan of Care was developed with the following considerations:  Patient / Family readiness to learn indicated by:verbalized understanding  Persons(s) to be included in education: patient  Barriers to Learning/Limitations:None

## 2024-09-01 ENCOUNTER — HOSPITAL ENCOUNTER (EMERGENCY)
Facility: HOSPITAL | Age: 39
Discharge: HOME | End: 2024-09-01
Attending: EMERGENCY MEDICINE
Payer: MEDICAID

## 2024-09-01 VITALS
RESPIRATION RATE: 20 BRPM | HEART RATE: 90 BPM | HEIGHT: 67 IN | TEMPERATURE: 98.7 F | SYSTOLIC BLOOD PRESSURE: 116 MMHG | OXYGEN SATURATION: 96 % | DIASTOLIC BLOOD PRESSURE: 72 MMHG | BODY MASS INDEX: 21.35 KG/M2 | WEIGHT: 136 LBS

## 2024-09-01 DIAGNOSIS — G51.0 BELL'S PALSY: Primary | ICD-10-CM

## 2024-09-01 PROCEDURE — 99283 EMERGENCY DEPT VISIT LOW MDM: CPT

## 2024-09-01 RX ORDER — DIPHENHYDRAMINE HCL 25 MG
1 CAPSULE ORAL EVERY 4 HOURS PRN
Qty: 15 ML | Refills: 0 | Status: SHIPPED | OUTPATIENT
Start: 2024-09-01 | End: 2024-09-06

## 2024-09-01 RX ORDER — PREDNISONE 20 MG/1
60 TABLET ORAL DAILY
Qty: 21 TABLET | Refills: 0 | Status: SHIPPED | OUTPATIENT
Start: 2024-09-01 | End: 2024-09-06

## 2024-09-01 RX ORDER — VALACYCLOVIR HYDROCHLORIDE 1 G/1
1000 TABLET, FILM COATED ORAL 3 TIMES DAILY
Qty: 21 TABLET | Refills: 0 | Status: SHIPPED | OUTPATIENT
Start: 2024-09-01 | End: 2024-09-06

## 2024-09-01 ASSESSMENT — PAIN DESCRIPTION - PAIN TYPE: TYPE: ACUTE PAIN

## 2024-09-01 ASSESSMENT — PAIN SCALES - GENERAL: PAINLEVEL_OUTOF10: 1

## 2024-09-01 ASSESSMENT — PAIN DESCRIPTION - FREQUENCY: FREQUENCY: INTERMITTENT

## 2024-09-01 ASSESSMENT — PAIN - FUNCTIONAL ASSESSMENT: PAIN_FUNCTIONAL_ASSESSMENT: 0-10

## 2024-09-01 NOTE — ED NOTES
Pt presents to ED ambulatory complaining of left sided numbness and oral numbness an hour prior to arrival in ED room. Pt states she feels like her tongue is swollen and reports SOB. Pt denies pain in tooth. Pt is alert and oriented x 4, RR even and unlabored, skin is warm and dry. Assessment completed and pt updated on plan of care.  Call bell in reach.        Emergency Department Nursing Plan of Care       The Nursing Plan of Care is developed from the Nursing assessment and Emergency Department Attending provider initial evaluation.  The plan of care may be reviewed in the “ED Provider note”.    The Plan of Care was developed with the following considerations:   Patient / Family readiness to learn indicated by:verbalized understanding  Persons(s) to be included in education: patient  Barriers to Learning/Limitations:None    Signed

## 2024-09-01 NOTE — ED PROVIDER NOTES
[facility] EMERGENCY DEPT  EMERGENCY DEPARTMENT ENCOUNTER       Pt Name: Sohail Gary  MRN: [id number]  Birthdate 1985  Date of evaluation: 2024  Provider: Marixa Cyr MD   PCP: No primary care provider on file.  Note Started: 6:01 PM 24     (Please note that parts of this dictation were completed with voice recognition software. Quite often unanticipated grammatical, syntax, homophones, and other interpretive errors are inadvertently transcribed by the computer software. Please disregards these errors. Please excuse any errors that have escaped final proofreading.)    CHIEF COMPLAINT       Chief Complaint   Patient presents with   • Numbness   • Oral Swelling        HISTORY OF PRESENT ILLNESS: 1 or more elements      History From: ***, History limited by: ***none     Sohail Gary is a 38 y.o. female who presents with numbness of her left infraorbital area and base of her mouth and sensation that her tongue is not moving appropriately.  She states she has had recent sensation that her nasal passages feel blocked.  And also unusual eye tearing.  She was seen at [facility] recently treated with antibiotics for a sinus infection and also given a course of steroids.  When she finished the steroid she started to have these facial symptoms.     Nursing Notes were all reviewed and agreed with or any disagreements were addressed in the HPI.     REVIEW OF SYSTEMS        Positives and Pertinent negatives as per HPI.    PAST HISTORY     Past Medical History:  Past Medical History:   Diagnosis Date   • Other ill-defined conditions(799.89)     HSV       Past Surgical History:  Past Surgical History:   Procedure Laterality Date   •  SECTION         Family History:  History reviewed. No pertinent family history.    Social History:  Social History     Tobacco Use   • Smoking status: Never   • Smokeless tobacco: Never   Vaping Use   • Vaping status: Never Used   Substance Use Topics   • Alcohol use: Not

## 2024-09-01 NOTE — ED NOTES
Discharge instructions were given to the patient by Lroraine MESSER. The patient left the Emergency Department ambulatory, alert and oriented and in no acute distress with 3 prescriptions. The patient was encouraged to call or return to the ED for worsening issues or problems and was encouraged to schedule a follow up appointment for continuing care. The patient verbalized understanding of discharge instructions and prescriptions, all questions were answered. The patient has no further concerns at this time.

## 2024-09-01 NOTE — ED TRIAGE NOTES
Left side facial numbness starting about one hour ago.  Feels as though her tongue is swelling and her throat is closing.  Feels short of breath.  Was seen recently for chest pain and shortness of breath and high heart rate.

## 2024-09-03 ENCOUNTER — HOSPITAL ENCOUNTER (INPATIENT)
Facility: HOSPITAL | Age: 39
LOS: 1 days | Discharge: LEFT BEFORE DISCHARGE | DRG: 751 | End: 2024-09-05
Attending: STUDENT IN AN ORGANIZED HEALTH CARE EDUCATION/TRAINING PROGRAM | Admitting: PSYCHIATRY & NEUROLOGY
Payer: MEDICAID

## 2024-09-03 DIAGNOSIS — R06.00 DYSPNEA AND RESPIRATORY ABNORMALITIES: ICD-10-CM

## 2024-09-03 DIAGNOSIS — Z71.1 MENTAL HEALTH-RELATED COMPLAINT: Primary | ICD-10-CM

## 2024-09-03 DIAGNOSIS — R20.2 FACIAL TINGLING: ICD-10-CM

## 2024-09-03 DIAGNOSIS — R06.89 DYSPNEA AND RESPIRATORY ABNORMALITIES: ICD-10-CM

## 2024-09-03 LAB
BASOPHILS # BLD: 0 K/UL (ref 0–0.1)
BASOPHILS NFR BLD: 1 % (ref 0–1)
D DIMER PPP FEU-MCNC: 0.27 MG/L FEU (ref 0–0.65)
DIFFERENTIAL METHOD BLD: ABNORMAL
EKG ATRIAL RATE: 77 BPM
EKG DIAGNOSIS: NORMAL
EKG P AXIS: 61 DEGREES
EKG P-R INTERVAL: 150 MS
EKG Q-T INTERVAL: 372 MS
EKG QRS DURATION: 80 MS
EKG QTC CALCULATION (BAZETT): 420 MS
EKG R AXIS: 70 DEGREES
EKG T AXIS: 70 DEGREES
EKG VENTRICULAR RATE: 77 BPM
EOSINOPHIL # BLD: 0 K/UL (ref 0–0.4)
EOSINOPHIL NFR BLD: 0 % (ref 0–7)
ERYTHROCYTE [DISTWIDTH] IN BLOOD BY AUTOMATED COUNT: 15.2 % (ref 11.5–14.5)
HCG UR QL: NEGATIVE
HCG, URINE, POC: NEGATIVE
HCT VFR BLD AUTO: 29.4 % (ref 35–47)
HGB BLD-MCNC: 9.3 G/DL (ref 11.5–16)
IMM GRANULOCYTES # BLD AUTO: 0 K/UL (ref 0–0.04)
IMM GRANULOCYTES NFR BLD AUTO: 1 % (ref 0–0.5)
LYMPHOCYTES # BLD: 1.9 K/UL (ref 0.8–3.5)
LYMPHOCYTES NFR BLD: 22 % (ref 12–49)
Lab: NORMAL
MCH RBC QN AUTO: 28.2 PG (ref 26–34)
MCHC RBC AUTO-ENTMCNC: 31.6 G/DL (ref 30–36.5)
MCV RBC AUTO: 89.1 FL (ref 80–99)
MONOCYTES # BLD: 0.8 K/UL (ref 0–1)
MONOCYTES NFR BLD: 10 % (ref 5–13)
NEGATIVE QC PASS/FAIL: NORMAL
NEUTS SEG # BLD: 5.8 K/UL (ref 1.8–8)
NEUTS SEG NFR BLD: 66 % (ref 32–75)
NRBC # BLD: 0 K/UL (ref 0–0.01)
NRBC BLD-RTO: 0 PER 100 WBC
PLATELET # BLD AUTO: 371 K/UL (ref 150–400)
PMV BLD AUTO: 9.6 FL (ref 8.9–12.9)
POSITIVE QC PASS/FAIL: NORMAL
RBC # BLD AUTO: 3.3 M/UL (ref 3.8–5.2)
WBC # BLD AUTO: 8.7 K/UL (ref 3.6–11)

## 2024-09-03 PROCEDURE — 80179 DRUG ASSAY SALICYLATE: CPT

## 2024-09-03 PROCEDURE — 93005 ELECTROCARDIOGRAM TRACING: CPT | Performed by: EMERGENCY MEDICINE

## 2024-09-03 PROCEDURE — 85025 COMPLETE CBC W/AUTO DIFF WBC: CPT

## 2024-09-03 PROCEDURE — 83735 ASSAY OF MAGNESIUM: CPT

## 2024-09-03 PROCEDURE — 90791 PSYCH DIAGNOSTIC EVALUATION: CPT

## 2024-09-03 PROCEDURE — 85379 FIBRIN DEGRADATION QUANT: CPT

## 2024-09-03 PROCEDURE — 84484 ASSAY OF TROPONIN QUANT: CPT

## 2024-09-03 PROCEDURE — 82077 ASSAY SPEC XCP UR&BREATH IA: CPT

## 2024-09-03 PROCEDURE — 99285 EMERGENCY DEPT VISIT HI MDM: CPT

## 2024-09-03 PROCEDURE — 36415 COLL VENOUS BLD VENIPUNCTURE: CPT

## 2024-09-03 PROCEDURE — 81001 URINALYSIS AUTO W/SCOPE: CPT

## 2024-09-03 PROCEDURE — 80053 COMPREHEN METABOLIC PANEL: CPT

## 2024-09-03 PROCEDURE — 80143 DRUG ASSAY ACETAMINOPHEN: CPT

## 2024-09-03 PROCEDURE — 81025 URINE PREGNANCY TEST: CPT

## 2024-09-03 PROCEDURE — 80307 DRUG TEST PRSMV CHEM ANLYZR: CPT

## 2024-09-03 ASSESSMENT — PAIN SCALES - GENERAL: PAINLEVEL_OUTOF10: 2

## 2024-09-03 ASSESSMENT — PAIN DESCRIPTION - LOCATION: LOCATION: CHEST

## 2024-09-03 ASSESSMENT — PAIN - FUNCTIONAL ASSESSMENT: PAIN_FUNCTIONAL_ASSESSMENT: 0-10

## 2024-09-04 ENCOUNTER — APPOINTMENT (OUTPATIENT)
Facility: HOSPITAL | Age: 39
DRG: 751 | End: 2024-09-04
Payer: MEDICAID

## 2024-09-04 PROBLEM — F33.0 MDD (MAJOR DEPRESSIVE DISORDER), RECURRENT EPISODE, MILD (HCC): Status: ACTIVE | Noted: 2024-09-04

## 2024-09-04 LAB
ALBUMIN SERPL-MCNC: 3.6 G/DL (ref 3.5–5)
ALBUMIN/GLOB SERPL: 1 (ref 1.1–2.2)
ALP SERPL-CCNC: 59 U/L (ref 45–117)
ALT SERPL-CCNC: 17 U/L (ref 12–78)
AMPHET UR QL SCN: NEGATIVE
ANION GAP SERPL CALC-SCNC: 5 MMOL/L (ref 5–15)
APAP SERPL-MCNC: 3 UG/ML (ref 10–30)
APPEARANCE UR: CLEAR
AST SERPL-CCNC: <3 U/L (ref 15–37)
BACTERIA URNS QL MICRO: NEGATIVE /HPF
BARBITURATES UR QL SCN: NEGATIVE
BENZODIAZ UR QL: NEGATIVE
BILIRUB SERPL-MCNC: 0.4 MG/DL (ref 0.2–1)
BILIRUB UR QL: NEGATIVE
BUN SERPL-MCNC: 7 MG/DL (ref 6–20)
BUN/CREAT SERPL: 8 (ref 12–20)
CALCIUM SERPL-MCNC: 9.3 MG/DL (ref 8.5–10.1)
CANNABINOIDS UR QL SCN: NEGATIVE
CHLORIDE SERPL-SCNC: 111 MMOL/L (ref 97–108)
CO2 SERPL-SCNC: 26 MMOL/L (ref 21–32)
COCAINE UR QL SCN: NEGATIVE
COLOR UR: ABNORMAL
CREAT SERPL-MCNC: 0.85 MG/DL (ref 0.55–1.02)
EKG ATRIAL RATE: 62 BPM
EKG DIAGNOSIS: NORMAL
EKG P AXIS: 15 DEGREES
EKG P-R INTERVAL: 136 MS
EKG Q-T INTERVAL: 378 MS
EKG QRS DURATION: 80 MS
EKG QTC CALCULATION (BAZETT): 383 MS
EKG R AXIS: 64 DEGREES
EKG T AXIS: 63 DEGREES
EKG VENTRICULAR RATE: 62 BPM
EPITH CASTS URNS QL MICRO: ABNORMAL /LPF
ETHANOL SERPL-MCNC: <10 MG/DL (ref 0–0.08)
GLOBULIN SER CALC-MCNC: 3.7 G/DL (ref 2–4)
GLUCOSE SERPL-MCNC: 84 MG/DL (ref 65–100)
GLUCOSE UR STRIP.AUTO-MCNC: NEGATIVE MG/DL
HGB UR QL STRIP: ABNORMAL
HYALINE CASTS URNS QL MICRO: ABNORMAL /LPF (ref 0–5)
KETONES UR QL STRIP.AUTO: ABNORMAL MG/DL
LEUKOCYTE ESTERASE UR QL STRIP.AUTO: NEGATIVE
Lab: NORMAL
MAGNESIUM SERPL-MCNC: 2 MG/DL (ref 1.6–2.4)
METHADONE UR QL: NEGATIVE
NITRITE UR QL STRIP.AUTO: NEGATIVE
OPIATES UR QL: NEGATIVE
PCP UR QL: NEGATIVE
PH UR STRIP: 7 (ref 5–8)
POTASSIUM SERPL-SCNC: 4 MMOL/L (ref 3.5–5.1)
PROT SERPL-MCNC: 7.3 G/DL (ref 6.4–8.2)
PROT UR STRIP-MCNC: NEGATIVE MG/DL
RBC #/AREA URNS HPF: ABNORMAL /HPF (ref 0–5)
SALICYLATES SERPL-MCNC: <1.7 MG/DL (ref 2.8–20)
SODIUM SERPL-SCNC: 142 MMOL/L (ref 136–145)
SP GR UR REFRACTOMETRY: 1.02 (ref 1–1.03)
TROPONIN I SERPL HS-MCNC: <4 NG/L (ref 0–51)
UROBILINOGEN UR QL STRIP.AUTO: 1 EU/DL (ref 0.2–1)
WBC URNS QL MICRO: ABNORMAL /HPF (ref 0–4)

## 2024-09-04 PROCEDURE — 6370000000 HC RX 637 (ALT 250 FOR IP)

## 2024-09-04 PROCEDURE — 70450 CT HEAD/BRAIN W/O DYE: CPT

## 2024-09-04 PROCEDURE — 1240000000 HC EMOTIONAL WELLNESS R&B

## 2024-09-04 PROCEDURE — 6370000000 HC RX 637 (ALT 250 FOR IP): Performed by: STUDENT IN AN ORGANIZED HEALTH CARE EDUCATION/TRAINING PROGRAM

## 2024-09-04 PROCEDURE — 6370000000 HC RX 637 (ALT 250 FOR IP): Performed by: PSYCHIATRY & NEUROLOGY

## 2024-09-04 RX ORDER — POLYETHYLENE GLYCOL 3350 17 G/17G
17 POWDER, FOR SOLUTION ORAL DAILY PRN
Status: DISCONTINUED | OUTPATIENT
Start: 2024-09-04 | End: 2024-09-05 | Stop reason: HOSPADM

## 2024-09-04 RX ORDER — ASPIRIN 81 MG/1
81 TABLET ORAL DAILY
Qty: 30 TABLET | Refills: 0 | Status: SHIPPED | OUTPATIENT
Start: 2024-09-04

## 2024-09-04 RX ORDER — HALOPERIDOL 5 MG/1
5 TABLET ORAL EVERY 4 HOURS PRN
Status: DISCONTINUED | OUTPATIENT
Start: 2024-09-04 | End: 2024-09-05 | Stop reason: HOSPADM

## 2024-09-04 RX ORDER — HYDROXYZINE HYDROCHLORIDE 50 MG/1
50 TABLET, FILM COATED ORAL 3 TIMES DAILY PRN
Status: DISCONTINUED | OUTPATIENT
Start: 2024-09-04 | End: 2024-09-05 | Stop reason: HOSPADM

## 2024-09-04 RX ORDER — ACETAMINOPHEN 325 MG/1
650 TABLET ORAL EVERY 4 HOURS PRN
Status: DISCONTINUED | OUTPATIENT
Start: 2024-09-04 | End: 2024-09-05 | Stop reason: HOSPADM

## 2024-09-04 RX ORDER — TRAZODONE HYDROCHLORIDE 50 MG/1
50 TABLET, FILM COATED ORAL NIGHTLY PRN
Status: DISCONTINUED | OUTPATIENT
Start: 2024-09-04 | End: 2024-09-05 | Stop reason: HOSPADM

## 2024-09-04 RX ORDER — DIPHENHYDRAMINE HYDROCHLORIDE 50 MG/ML
50 INJECTION INTRAMUSCULAR; INTRAVENOUS EVERY 4 HOURS PRN
Status: DISCONTINUED | OUTPATIENT
Start: 2024-09-04 | End: 2024-09-05 | Stop reason: HOSPADM

## 2024-09-04 RX ORDER — SENNOSIDES A AND B 8.6 MG/1
1 TABLET, FILM COATED ORAL DAILY PRN
Status: DISCONTINUED | OUTPATIENT
Start: 2024-09-04 | End: 2024-09-05 | Stop reason: HOSPADM

## 2024-09-04 RX ORDER — HALOPERIDOL 5 MG/ML
5 INJECTION INTRAMUSCULAR EVERY 4 HOURS PRN
Status: DISCONTINUED | OUTPATIENT
Start: 2024-09-04 | End: 2024-09-05 | Stop reason: HOSPADM

## 2024-09-04 RX ORDER — ASPIRIN 81 MG/1
81 TABLET, CHEWABLE ORAL ONCE
Status: COMPLETED | OUTPATIENT
Start: 2024-09-04 | End: 2024-09-04

## 2024-09-04 RX ORDER — ESCITALOPRAM OXALATE 10 MG/1
5 TABLET ORAL DAILY
Status: DISCONTINUED | OUTPATIENT
Start: 2024-09-04 | End: 2024-09-05 | Stop reason: HOSPADM

## 2024-09-04 RX ORDER — MAGNESIUM HYDROXIDE/ALUMINUM HYDROXICE/SIMETHICONE 120; 1200; 1200 MG/30ML; MG/30ML; MG/30ML
30 SUSPENSION ORAL EVERY 6 HOURS PRN
Status: DISCONTINUED | OUTPATIENT
Start: 2024-09-04 | End: 2024-09-05 | Stop reason: HOSPADM

## 2024-09-04 RX ADMIN — ESCITALOPRAM OXALATE 5 MG: 10 TABLET ORAL at 11:49

## 2024-09-04 RX ADMIN — HYDROXYZINE HYDROCHLORIDE 50 MG: 50 TABLET, FILM COATED ORAL at 16:41

## 2024-09-04 RX ADMIN — ASPIRIN 81 MG CHEWABLE TABLET 81 MG: 81 TABLET CHEWABLE at 00:51

## 2024-09-04 RX ADMIN — HYDROXYZINE HYDROCHLORIDE 50 MG: 50 TABLET, FILM COATED ORAL at 09:08

## 2024-09-04 ASSESSMENT — SLEEP AND FATIGUE QUESTIONNAIRES
DO YOU USE A SLEEP AID: YES
AVERAGE NUMBER OF SLEEP HOURS: 4
DO YOU HAVE DIFFICULTY SLEEPING: YES
SLEEP PATTERN: RESTLESSNESS

## 2024-09-04 ASSESSMENT — PAIN SCALES - GENERAL
PAINLEVEL_OUTOF10: 0
PAINLEVEL_OUTOF10: 0
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
## 2024-09-04 NOTE — BSMART NOTE
BSMART assessment completed, and suicide risk level noted to be Moderate Risk . Sitter at bedside . Charge Nurse Karin and Physician Sindi notified. Concerns not observed. Pt reporting passive SI without plan or intent due to on going medical concerns. Pt provided verbal consent to speak with her  who reported no concerns for safety with Pt returning home

## 2024-09-04 NOTE — BH NOTE
PSYCHOSOCIAL ASSESSMENT    Patient identifying info:     Sohail Gary is a 38 y.o., female admitted 9/3/2024 11:40 PM     Presenting problem and precipitating factors: Patient reported to ED with chest pain and shortness of breath, as well as right sided facial numbness. Patient reported a recent diagnosis of [place] Palsy. Patient reported trouble with sleep and appetite disturbances with sleeping 2-3 hours per night and eating one meal a day. Patient was discharged from [facility] on 9/3/2024. Patient denies HI, V. Patient has a history of 2 attempts with the most recent in May 2024 in the form of OD on alcohol and cocaine that resulted in admission to [facility].     Mental status assessment: Patient is alert and oriented. Patient's tone is even and soft and eye contact is regular. Patient seems withdrawn. Patient is concerned with physical health, specifically shortness of breath.    Strengths/Recreation/Coping Skills: Patient reported that her children, raymond, family, and career allow her to be more future oriented and rational in her decision making.     Collateral information:     Current psychiatric /substance abuse providers and contact info: [facility]  is Sam Ruiz, [phone] and unknown psychiatrist at [facility]    Previous psychiatric/substance abuse providers and response to treatment: Two previous admissions to [facility] earlier this year after OD attempt    Family history of mental illness or substance abuse: Unknown because of foster care at a young age. Patient has no contact with birth parents.     Substance abuse history:  Alcohol, cocaine  Social History     Tobacco Use    Smoking status: Never    Smokeless tobacco: Never   Substance Use Topics    Alcohol use: Not Currently       History of biomedical complications associated with substance abuse: History of experiencing withdrawal from alcohol    Patient's current acceptance of treatment or motivation for change: Admitted voluntarily    Family  yes

## 2024-09-04 NOTE — CARE COORDINATION
09/04/24 0947   Suicidal Ideation   Wish to be Dead Lifetime - Yes;Past 1 month - Yes   Non-Specific Active Suicidal Thoughts Lifetime - Yes;Past 1 month - Yes   Suicidal Behavior Trigger Wish to be Dead;Non-Specific Active Suicidal Thoughts   Active Suicidal Ideation with Any Methods (Not Plan) without Intent to Act Lifetime - Yes;Past 1 month - Yes   Active Suicidal Ideation with Some Intent to Act, without Specific Plan Lifetime - Yes;Past 1 month - No   Active Suicidal Ideation with Specific Plan and Intent Lifetime - Yes;Past 1 month - No   Intensity of Ideation   Lifetime - Most Severe Ideation 2   Lifetime - Most Recent Ideation 4   Frequency 2-5 times in a week   Duration 1-4 hours/a lot of time   Controllability Can control thoughts with some difficulty   Deterrents Deterrents probably stopped you   Reasons for Ideation Completely to end or stop the pain   Suicidal Behavior   Actual Attempt Lifetime - Yes;Past 3 months - No   Total # of Attempts 1   Has subject engaged in Non-Suicidal Self-Injurious Behavior? Lifetime - Yes;Past 3 months - No   Interrupted Attempt Lifetime - No;Past 3 months - No   Total # of interrupted 0   Total # of aborted or self-interrupted 1   Preparatory Acts or Behavior Lifetime - Yes;Past 3 months - No   Total # of Preparatory Acts 1   Actual Lethality/Medical Damage 0   Potential Lethality 2   Most Recent Attempt Date 05/04/24

## 2024-09-04 NOTE — BH NOTE
Patient asking to use phone so she can look at her heart monitor kameron. Patient's provider notified, provider told this writer that patient can use phone for cardiac kameron during treatment team tomorrow. Patient also says she needs her home medications restarted, says she was taking valacyclovir for Bell's Palsy and was taking amoxicillin for an upper respiratory infection. Provider notified, who told this writer that those medications would not be restarted tonight and these medications would be discussed tomorrow in treatment team.    Patient is very somatic. Says she is nauseous from the veggie burger she got for dinner, didn't want to order something else for dinner due to her nausea. Patient is vegan.    2025: Patient transferred to general unit per provider orders. Report given to Bibi AGUILA RN.

## 2024-09-04 NOTE — ED NOTES
TRANSFER - OUT REPORT:    Verbal report given to amy Muñoz  on Sohail Gary  being transferred to  732 for routine progression of patient care       Report consisted of patient's Situation, Background, Assessment and   Recommendations(SBAR).     Information from the following report(s) Nurse Handoff Report, ED Encounter Summary, Adult Overview, Intake/Output, MAR, Recent Results, Cardiac Rhythm NSR, and Neuro Assessment was reviewed with the receiving nurse.    [place] Assessment:    Presents to emergency department  because of falls (Syncope, seizure, or loss of consciousness): No  Age > 70: No  Altered Mental Status, Intoxication with alcohol or substance confusion (Disorientation, impaired judgment, poor safety awaremess, or inability to follow instructions): No  Impaired Mobility: Ambulates or transfers with assistive devices or assistance; Unable to ambulate or transer.: No  Nursing Judgement: No          Lines:       Opportunity for questions and clarification was provided.      Patient transported with:  Registered Nurse and Security

## 2024-09-04 NOTE — PROGRESS NOTES
Spiritual Health Assessment/Progress Note  [facility]    Behavioral Health, Attempted Encounter,  ,  ,      Name: Sohail Gary MRN: [id number]    Age: 38 y.o.     Sex: female   Language: English   Gnosticism: Religion   MDD (major depressive disorder), recurrent episode, mild (HCC)     Date: 9/4/2024            Total Time Calculated: 10 min              Spiritual Assessment began in [facility] 7W ACUTE [facility]        Referral/Consult From: Rounding   Encounter Overview/Reason: Behavioral Health, Attempted Encounter  Service Provided For: Patient not available    Winter, Belief, Meaning:   Patient Other: Unable to assess; pt was asleep  Family/Friends No family/friends present      Importance and Influence:  Patient Other: Unable to assess; pt was asleep  Family/Friends no family/friends present    Community:  Patient Other: Unable to assess; pt was asleep  Family/Friends Other: NA    Assessment and Plan of Care:     Patient Interventions include: Other: NA  Family/Friends Interventions include: Other: NA    Patient Plan of Care: Spiritual Care available upon further referral  Family/Friends Plan of Care: Other: NA    Electronically signed by CARLA Zhu on 9/4/2024 at 3:12 PM  For additional spiritual care, please contact the  on-call at [phone]).    Hannah Dutton MDiv, MS, BCC  Staff   Spiritual Health Services

## 2024-09-04 NOTE — BSMART NOTE
Bsmart Progress Note:    Pt accepted to [facility] by Aubrey COMBS / Dr Moses  room 732/02, ext [phone]     Ed made aware
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
## 2024-09-04 NOTE — PROGRESS NOTES
Behavioral Services  Medicare Certification Upon Admission    I certify that this patient's inpatient psychiatric hospital admission is medically necessary for:    [x] (1) Treatment which could reasonably be expected to improve this patient's condition,       [] (2) Or for diagnostic study;     AND     [x](2) The inpatient psychiatric services are provided while the individual is under the care of a physician and are included in the individualized plan of care.    Estimated length of stay/service 3-5 days.    Plan for post-hospital care Outpatient Psychiatry.    Electronically signed by Junior Hill MD on 9/4/2024 at 10:43 AM

## 2024-09-04 NOTE — ED NOTES
10:50 PM    37 yo F presenting with numbness and tingling to her face. Left sided started 3 days ago and right side today while in the ED at [facility] prior to arrival. Pt also endorses chest pain, shortness of breath x 3 weeks. Has had numerous ED visits since onset for similar complaints. Also reports SI with a plan.     I have evaluated the patient as the Provider in Rapid Medical Evaluation (RME). I have reviewed her vital signs and the triage nurse assessment. I have talked with the patient and any available family and advised that I am the provider in triage and have ordered the appropriate study to initiate their work up based on the clinical presentation during my assessment. I have advised that the patient will be accommodated in the Main ED as soon as possible. I have also requested to contact the triage nurse or myself immediately if the patient experiences any changes in their condition during this brief waiting period.  BALTAZAR Farr Emily A, PA  09/03/24 [id number]

## 2024-09-04 NOTE — TRANSITION OF CARE
TRANSFER - IN REPORT:    Verbal report received from Evelia SUNG RN on Sohail Gary  being received from [facility] ED for routine progression of patient care      Report consisted of patient's Situation, Background, Assessment and   Recommendations(SBAR).     Information from the following report(s) Nurse Handoff Report, ED Encounter Summary, ED SBAR, Recent Results, and Med Rec Status was reviewed with the receiving nurse.    Opportunity for questions and clarification was provided.      Assessment completed upon patient's arrival to unit and care assumed.

## 2024-09-04 NOTE — BH NOTE
Behavioral Health Interdisciplinary Rounds     Patient Name: Sohail Gary  Age: 38 y.o.  Room/Bed:  2/  Primary Diagnosis: MDD (major depressive disorder), recurrent episode, mild (HCC)   Admission Status:  Voluntary     Readmission within 30 days:   Power of  in place: no  Patient requires a blocked bed:   no        Reason for blocked bed:   Sleep hours: 1 hour - new admission        Participation in Care/Groups:  new admission   Medication Compliant?:   PRNS (last 24 hours): no    Restraints (last 24 hours):  no  ___________________________________  OQ Admission Analysis Survey completed:no  OQ Admission Analysis Survey score:    __________________________________________________     Alcohol screening (AUDIT) completed -  yes    SCORE 0   Tobacco :  no  Illegal Drugs use:  no  CSSRS Lifetime - completed     24 hour chart check complete: new admission      _______________________________________________    Patient goal(s) for today: meet with treatment team   Treatment team focus/goals: Plan to assess for medications and and discharge needs   Progress note: She denies SI today, very anxious and somatic concerns, willing to change medications - plan to start Lexapro      Spiritual Care Consult: no  Financial concerns/prescription coverage:  medicaid   Family contact:     - she signed a ULISES -  Sw spoke to her  and updated him on th treatment plan                    Family requesting physician contact today:    Discharge plan: she will return home   Access to weapons : no                                                             Outpatient provider(s): [facility]     LOS:  0  Expected LOS: TBD     Participating treatment team members: Sohail NILES Cookie, ROBYN Dorantes- Dr. Saji Chand,RN Maya HERNANDEZ, MSW Student

## 2024-09-04 NOTE — DISCHARGE INSTRUCTIONS
DISCHARGE SUMMARY    NAME:Sohail Gary  : 1985  MRN: [id number]    The patient Sohail Gary exhibits the ability to control behavior in a less restrictive environment.  Patient's level of functioning is improving.  No assaultive/destructive behavior has been observed for the past 24 hours.  No suicidal/homicidal threat or behavior has been observed for the past 24 hours.  There is no evidence of serious medication side effects.  Patient has not been in physical or protective restraints for at least the past 24 hours.    If weapons involved, how are they secured? No weapons involved , but there are weapons at home (  has secured )     Is patient aware of and in agreement with discharge plan? She is aware of discharge and is requesting discharge.    Arrangements for medication:  No prescriptions given     Copy of discharge instructions to provider?:  Yes    Arrangements for transportation home:  Family to     Keep all follow up appointments as scheduled, continue to take prescribed medications per physician instructions.  Mental health crisis number:  911 or your local mental health crisis line number at [phone]      Mental Health Emergency WARM LINE      2-585-123-[facility] (6428)      M-F: 9am to 9pm      Sat & Sun: 5pm - 9pm  National suicide prevention lines:                             3-628-JLHCFHU (1-605.770.6542)       4-133-912-TALK (1-725.838.5531)    Crisis Text Line:  Text HOME to 133895            Take the prescribed medication aspirin which should help protect against strokes.  Return to the emergency department if symptoms change or worsen or you have any other concerns.  As scheduled follow-up with pulmonology and cardiology for further care and evaluation.

## 2024-09-04 NOTE — BSMART NOTE
Comprehensive Assessment Form Part 1      Section I - Disposition    Primary Diagnosis: Major Depressive Disorder   Secondary Diagnosis:   Past Medical History:   Diagnosis Date    Other ill-defined conditions(009.89)     HSV      The Medical Doctor to Psychiatrist conference was notcompleted.  The Medical Doctor is in agreement with [facility] Clinician's disposition .  The plan is to admit into BHU.  The on-call Psychiatrist consulted was GARRET Alcantara.  The admitting Psychiatrist will be Dr. BELLO.  The admitting Diagnosis is MDD.  The Payor source is [facility] [employer]   This writer reviewed the [place] Suicide Severity Rating Scale in nursing flowsheet and the risk level assigned is High risk.  Based on this assessment, the risk of suicide is Moderate risk and the plan is to admit into BHU .    Section II - Integrated Summary  Summary:    Per Triage Note:  Pt ambulatory to triage with c/o of left sided facial numbness x 3 days. Pt was diagnosed with [place] Palsy.  Pt discharged from [facility] 20 min PTA. Pt states that she had chest pain  and right sided facial numbness there had lab work.  Pt states that she has had chest pain and shortness of breath, has a monitor in place and pulse ox.  States that pulse ox dipped to 50% while at [facility].  Pt also states that she is SI, did [facility] today, recommended that pt stay     Writer met with Pt at 11:50pm with hx of  Major Depressive d/o, recurrent, severe, without psychotic features , Cocaine use Disorder , Alcohol use Disorder. Writer  assessed Pt face-to-face at bedside who appeared A&Ox4 endorsing SI without plan or intent, initially after flagging HIGH Risk by triage Domi Alexander RN at 10:46pm. Pt reported to been having thoughts prior to coming into [facility] after [facility] , although now denying plan and intent.     Per [facility] ED note dated 9/3/24 \". In last few weeks she has been having suicide with possible plan  to overdose on cocaine. Last cocaine and alcohol use was 3 weeks ago \" . Pt also reported having fleeting thoughts of jumping off [place]  bridge and \"She states she does cut herself, last was a week ago due to excessive emotions and as a coping mechanism, and not as a suicide attempt \".      Tonight at [facility] ED, Pt shared that she has considered her children, raymond, family , and career which allows her now to be more future oriented and rational in her decision making. Pt appeared to be anxious and in physical distress from medical concerns she reported to have recently been diagnosed [place] Palsy and to be experiencing numbness in her face and shortness of breath. Per Chart review Pt was cleared and discharged from [facility] 9/3/24 around 9:30pm after being seen for the same CC. Per chart review Pt reported to have been recommended to stay psychiatrically but safety planned home instead with .     Tonight, Pt is denying HI/AH/VH and perceptual disturbances. Pt reported to sleep 2-3 hours daily and struggle with sleep disturbance along with appetite. Pt shared that she has been eating one meal a day and feeling \"like I can't think straight\". Pt also does not appear to be responding to any internal stimuli or experiencing any delusional thoughts, but experiencing heightened anxiety related to her medical concerns. Pt currently wears a heart monitor and reports to struggle with movement although does not require any mobility equipment. Pt dressed appropriately for the weather and appeared groomed. Pt's speech clear without any impairments. Pt requested to discharge with safety plan after being recommended to stay , although requested writer to return to her room at 2:55am requesting to stay voluntarily.     During this time Pt observed vacillating over staying and became more anxious and irritable with writer by abruptly threatening to leave ED without discharge paper work. Pt appeared fixated on medical concerns

## 2024-09-04 NOTE — PLAN OF CARE
Problem: Depression  Goal: Will be euthymic at discharge  Description: INTERVENTIONS:  1. Administer medication as ordered  2. Provide emotional support via 1:1 interaction with staff  3. Encourage involvement in milieu/groups/activities  4. Monitor for social isolation  Outcome: Not Progressing     Problem: Anxiety  Goal: Will report anxiety at manageable levels  Description: INTERVENTIONS:  1. Administer medication as ordered  2. Teach and rehearse alternative coping skills  3. Provide emotional support with 1:1 interaction with staff  Outcome: Not Progressing

## 2024-09-04 NOTE — ED TRIAGE NOTES
Pt ambulatory to triage with c/o of left sided facial numbness x 3 days. Pt was diagnosed with [place] Palsy.  Pt discharged from [facility] 20 min PTA. Pt states that she had chest pain  and right sided facial numbness there had lab work.  Pt states that she has had chest pain and shortness of breath, has a monitor in place and pulse ox.  States that pulse ox dipped to 50% while at [facility].  Pt also states that she is SI, did see a counselor at [facility] today, recommended that pt stay.

## 2024-09-04 NOTE — ED PROVIDER NOTES
MD Vinnie       CONSULTS:  IP CONSULT TO BSMART    PROCEDURES:  Procedures    FINAL IMPRESSION      1. Mental health-related complaint    2. Dyspnea and respiratory abnormalities    3. Facial tingling          DISPOSITION/PLAN   DISPOSITION  09/04/2024 03:18:51 AM    (Please note that portions of this note were completed with a voice recognition program.  Efforts were made to edit the dictations but occasionally words are mis-transcribed.)    Vinnie Delong MD (electronically signed)  Emergency Attending Physician      Vinnie Delong MD  09/04/24 [id number]

## 2024-09-04 NOTE — ED NOTES
Pt was placed in a green gown and belonging are placed in a bag. Bag is in tote 7. Security came and wanded pt.

## 2024-09-04 NOTE — CARE COORDINATION
09/04/24 0823   ITP   Date of Plan 09/04/24   Date of Next Review 09/11/24   Primary Diagnosis Code Major depressive disorder, recurrent (HCC)   Barriers to Treatment Need for psychoeducation   Strengths Incorporated in Plan Acknowledging need for assistance;Community supports;Natural supports;Verbal   Short Term Goal 1   Baseline Functioning Client reports using cocaine and ETOH, reports her goal is to be healthy so she can go back to the career she enjoys   Target Reduction in unsafe/reckless behavior and maladaptive coping through use of healthy coping skills/mechanisms   Objectives Client will participate in group therapy   Intervention 1 Group therapy   Frequency daily   Measured by Staff observation;Self report;Behavioral data   Staff Responsible [facility] staff;Clinical staff   Intervention 2 Milieu therapy and support   Frequency daily   Measured by Behavioral data;Self report;Staff observation   Staff Responsible [facility] staff;Clinical staff   Intervention 3 Assess safety   Frequency daily   Measured by Behavioral data;Self report;Staff observation   Short Term Goal 2   Short Term Goal 2 Client will maintain compliance with medication regime   Baseline Functioning Client presents with exacerbated symptoms of mental illness/distress   Target Client will comply with and feel supported by medication regimen   Objectives Other (comment)  (medication compliance)   Intervention 1 Monitor medications   Frequency daily   Measured by Behavioral data;Self report;Staff observation   Staff Responsible Clinical staff   Crisis/Safety/Discharge Plan   Crisis/Safety Plan Standard program interventions and protocol   Comprehensive Assessment Completion Date 09/04/24   Discharge Plan She will return home with behavioral health follow up

## 2024-09-04 NOTE — BH NOTE
4 Eyes Skin Assessment     NAME:  Sohail Gary  YOB: 1985  MEDICAL RECORD NUMBER:  [id number]    The patient is being assessed for  Admission    I agree that at least one RN has performed a thorough Head to Toe Skin Assessment on the patient. ALL assessment sites listed below have been assessed.      Areas assessed by both nurses:    Head, Face, Ears, Shoulders, Back, Chest, Arms, Elbows, Hands, Sacrum. Buttock, Coccyx, Ischium, Legs. Feet and Heels, and Under Medical Devices         Does the Patient have a Wound? No noted wound(s)       [place] Prevention initiated by RN: No  Wound Care Orders initiated by RN: No    Pressure Injury (Stage 3,4, Unstageable, DTI, NWPT, and Complex wounds) if present, place Wound referral order by RN under : No    New Ostomies, if present place, Ostomy referral order under : No     Nurse 1 eSignature: Electronically signed by GUI Mohan on 9/4/24 at 6:41 AM EDT    **SHARE this note so that the co-signing nurse can place an eSignature**    Nurse 2 eSignature: Electronically signed by GUI Cagle on 9/4/24 at 6:41 AM EDT

## 2024-09-05 VITALS
WEIGHT: 136.91 LBS | HEIGHT: 67 IN | DIASTOLIC BLOOD PRESSURE: 82 MMHG | RESPIRATION RATE: 18 BRPM | HEART RATE: 105 BPM | BODY MASS INDEX: 21.49 KG/M2 | SYSTOLIC BLOOD PRESSURE: 118 MMHG | OXYGEN SATURATION: 100 % | TEMPERATURE: 98.6 F

## 2024-09-05 NOTE — BH NOTE
Patient is anxious, expressing inability to catch breath, feeling panic  Vital signs are   /82    R - 18  O2 - 100 %    Offer scheduled Lexapro 10 PO  Atarax 50 mg PO  Patient refuses medications. \"I just need to get the fuck out of here\" Yall aren't helping me at all. Offer therapeutic communication, deep breathing slowly, medication..Patient refuses again.  Provider notified

## 2024-09-05 NOTE — BH NOTE
Behavioral Health Transition Record    Patient Name: Sohail Gary  YOB: 1985   Medical Record Number: [id number]  Date of Admission: 9/3/2024 11:40 PM   Date of Discharge: 9/5/24     Attending Provider: Junior Hill MD   Discharging Provider: Dr. Hill   To contact this individual call [phone]  and ask the  to page.  If unavailable, ask to be transferred to Behavioral Health Provider on call.  A Behavioral Health Provider will be available on call 24/7 and during holidays.    Primary Care Provider: Kurt Victoria MD    Allergies   Allergen Reactions    Adhesive Tape Rash    Cabbage Itching and Nausea And Vomiting       Reason for Admission:   ADMISSION EVALUATION:  CHIEF COMPLAINT: \"the last weeks have been pure hell.\"        HISTORY OF PRESENTING COMPLAINT:  Sohail Gary is a 38 y.o. Black /  female who is currently admitted to the acute side of 7th floor behavioral health Unit at [facility].      Ms Gary reports that she's been feeling quite depressed. She's been trying to fight the thought of death. She shares that she doesn't want any of her babies to find her unresponsive. She recalls that the last 3 weeks she's been struggling with many somatic symptoms for which she feels nobody is giving her any answers. She has a passive death wish. She says that this is all affecting her whole body. She feels that she is sleeping any where between 2-5 hours. She forces herself to eat. She describes having mood lability. Review of psychiatric symptoms is negative for hallucinations.  keeps fire-arms put away.     She opens up about significant past trauma. She experiences flashbacks, and has 'weird' nightmares. Sohail reports many somatic symptoms including tingling, numbness in one side of face that started on the other side, heart beating faster, and shallow breathing.     PAST PSYCHIATRIC HISTORY     Admission Diagnosis: Dyspnea and  degrees    Diagnosis       Normal sinus rhythm  Normal ECG  When compared with ECG of 30-AUG-2024 00:58,  No significant change was found  Confirmed by Domingo Garvey ([id number]) on 9/4/2024 8:12:56 AM         Immunizations administered during this encounter:   There is no immunization history on file for this patient.  Influenza Vaccination Status: None of the above/Not documented/Unable to determine from medical record documentation    Screening for Metabolic Disorders for Patients on Antipsychotic Medications  (Data obtained from the EMR)    Estimated Body Mass Index  Body mass index is 21.44 kg/m².      Vital Signs/Blood Pressure  /82   Pulse (!) 105   Temp 98.6 °F (37 °C) (Oral)   Resp 18   Ht 1.702 m (5' 7\")   Wt 62.1 kg (136 lb 14.5 oz)   LMP 09/01/2024 (Exact Date)   SpO2 100%   BMI 21.44 kg/m²      Fasting Blood Glucose or Hemoglobin A1c  No results found for: \"GLU\", \"GLUCPOC\"    Hemoglobin A1C   Date Value Ref Range Status   08/14/2024 5.0 4.0 - 5.6 % Final     Comment:     (NOTE)  HbA1C Interpretive Ranges  <5.7              Normal  5.7 - 6.4         Consider Prediabetes  >6.5              Consider Diabetes         Discharge Diagnosis: MDD, recurrent, severe, without psychotic features, with somatization  Alcohol use d/o    Discharge Plan/Destination: She was discharge AMA - denies SI- continues to feel she is her medical issues are not being addressed     The patient Sohail Gary exhibits the ability to control behavior in a less restrictive environment.  Patient's level of functioning is improving.  No assaultive/destructive behavior has been observed for the past 24 hours.  No suicidal/homicidal threat or behavior has been observed for the past 24 hours.  There is no evidence of serious medication side effects.  Patient has not been in physical or protective restraints for at least the past 24 hours.    If weapons involved, how are they secured? No weapons involved , but there are

## 2024-09-05 NOTE — DISCHARGE SUMMARY
features, with somatization  Alcohol use d/o     PLAN:  Recommend inpatient psychiatric admission to mitigate the risk of further decompensation.  Will obtain pertinent labs and collateral information.Encourage patient to participate in programming and group activities.  Medication Regimen As follows:  D/c wellbutrin.  D/c disulfram  Recommend starting ssri, lexapro 5mg po qday.  Patient wants to follow up with her pulmonologist because she feels an urgent need to check.  No indication for continuing previously prescribed steroid, antibiotic, or valtrex.    COURSE IN THE HOSPITAL:    Patient was admitted to the inpatient psychiatry unit for acute psychiatric stabilization in regards to symptomatology as described in the HPI above and placed on Q15 minute checks and withdrawal precautions.     Patient reports that she is experiencing many distressing symptoms that she doesn't feel like we are attending to them. She denied having SI or HI.  No AVH.    DISCHARGE DIAGNOSIS:  MDD, recurrent, severe, without psychotic features, with somatization  Alcohol use d/o         Medication List        START taking these medications      aspirin 81 MG EC tablet  Take 1 tablet by mouth daily            ASK your doctor about these medications      albuterol sulfate  (90 Base) MCG/ACT inhaler  Commonly known as: Ventolin HFA  Inhale 2 puffs into the lungs 4 times daily as needed for Wheezing     artificial tears 0.1-0.3 % Soln ophthalmic solution  Generic drug: dextran 70-hypromellose  Place 1 drop into the left eye every 4 hours as needed (eye dryness)     buPROPion 150 MG extended release tablet  Commonly known as: WELLBUTRIN XL  Take 1 tablet by mouth daily     cetirizine 10 MG tablet  Commonly known as: ZYRTEC     disulfiram 250 MG tablet  Commonly known as: Antabuse  Take 1 tablet by mouth daily     fluticasone 50 MCG/ACT nasal spray  Commonly known as: FLONASE  1 spray by Each Nostril route daily     naproxen 500 MG EC

## 2024-09-05 NOTE — PLAN OF CARE
Problem: Depression  Goal: Will be euthymic at discharge  Description: INTERVENTIONS:  1. Administer medication as ordered  2. Provide emotional support via 1:1 interaction with staff  3. Encourage involvement in milieu/groups/activities  4. Monitor for social isolation  9/5/2024 0838 by Christa Cr, RN  Outcome: Progressing    Problem: Anxiety  Goal: Will report anxiety at manageable levels  Description: INTERVENTIONS:  1. Administer medication as ordered  2. Teach and rehearse alternative coping skills  3. Provide emotional support with 1:1 interaction with staff  9/5/2024 0838 by Christa Cr, RN  Outcome: Progressing    Patient seen in treatment team, increased anxiety r/t heart monitor.  Patient feeling like she is having a medical emergency.  Reiterated by Dr. Hill that anxiety and past trauma is playing part in increased anxiety.    Patient asking to leave AMA  Patient will d/c without mediations, patient denies SI.  Patient states she is feeling safe to go home.    Minimla interaction with staff and peers.   Med and meal compliant.    Will continue to monitor q15 minutes for safety checks.

## 2024-09-05 NOTE — PLAN OF CARE
Problem: Safety - Adult  Goal: Free from fall injury  Outcome: Progressing   Patient received resting quietly in bed. No signs of distress. Even and unlabored breathing. Staff will continue to monitor safety q15 and provide support.

## 2024-09-06 ENCOUNTER — HOSPITAL ENCOUNTER (EMERGENCY)
Facility: HOSPITAL | Age: 39
Discharge: HOME | End: 2024-09-06
Payer: MEDICAID

## 2024-09-06 ENCOUNTER — APPOINTMENT (OUTPATIENT)
Facility: HOSPITAL | Age: 39
End: 2024-09-06
Payer: MEDICAID

## 2024-09-06 VITALS
HEART RATE: 89 BPM | HEIGHT: 67 IN | TEMPERATURE: 99.4 F | SYSTOLIC BLOOD PRESSURE: 111 MMHG | OXYGEN SATURATION: 100 % | WEIGHT: 135 LBS | DIASTOLIC BLOOD PRESSURE: 70 MMHG | RESPIRATION RATE: 16 BRPM | BODY MASS INDEX: 21.19 KG/M2

## 2024-09-06 DIAGNOSIS — R07.89 ATYPICAL CHEST PAIN: Primary | ICD-10-CM

## 2024-09-06 DIAGNOSIS — F41.1 ANXIETY STATE: ICD-10-CM

## 2024-09-06 LAB
ALBUMIN SERPL-MCNC: 4.4 G/DL (ref 3.5–5)
ALBUMIN/GLOB SERPL: 0.9 (ref 1.1–2.2)
ALP SERPL-CCNC: 87 U/L (ref 45–117)
ALT SERPL-CCNC: 19 U/L (ref 12–78)
AMPHET UR QL SCN: NEGATIVE
ANION GAP SERPL CALC-SCNC: 11 MMOL/L (ref 2–12)
AST SERPL-CCNC: 11 U/L (ref 15–37)
BARBITURATES UR QL SCN: NEGATIVE
BASOPHILS # BLD: 0 K/UL (ref 0–0.1)
BASOPHILS NFR BLD: 0 % (ref 0–1)
BENZODIAZ UR QL: NEGATIVE
BILIRUB SERPL-MCNC: 0.3 MG/DL (ref 0.2–1)
BUN SERPL-MCNC: 9 MG/DL (ref 6–20)
BUN/CREAT SERPL: 12 (ref 12–20)
CALCIUM SERPL-MCNC: 9.8 MG/DL (ref 8.5–10.1)
CANNABINOIDS UR QL SCN: NEGATIVE
CHLORIDE SERPL-SCNC: 99 MMOL/L (ref 97–108)
CO2 SERPL-SCNC: 28 MMOL/L (ref 21–32)
COCAINE UR QL SCN: NEGATIVE
CREAT SERPL-MCNC: 0.76 MG/DL (ref 0.55–1.02)
D DIMER PPP FEU-MCNC: 0.35 MG/L FEU (ref 0–0.65)
DIFFERENTIAL METHOD BLD: ABNORMAL
EKG ATRIAL RATE: 92 BPM
EKG DIAGNOSIS: NORMAL
EKG P AXIS: 80 DEGREES
EKG P-R INTERVAL: 128 MS
EKG Q-T INTERVAL: 330 MS
EKG QRS DURATION: 76 MS
EKG QTC CALCULATION (BAZETT): 408 MS
EKG R AXIS: 72 DEGREES
EKG T AXIS: 74 DEGREES
EKG VENTRICULAR RATE: 92 BPM
EOSINOPHIL # BLD: 0 K/UL (ref 0–0.4)
EOSINOPHIL NFR BLD: 0 % (ref 0–7)
ERYTHROCYTE [DISTWIDTH] IN BLOOD BY AUTOMATED COUNT: 15.2 % (ref 11.5–14.5)
GLOBULIN SER CALC-MCNC: 4.8 G/DL (ref 2–4)
GLUCOSE SERPL-MCNC: 104 MG/DL (ref 65–100)
HCG UR QL: NEGATIVE
HCT VFR BLD AUTO: 35.1 % (ref 35–47)
HGB BLD-MCNC: 11.5 G/DL (ref 11.5–16)
IMM GRANULOCYTES # BLD AUTO: 0.2 K/UL (ref 0–0.04)
IMM GRANULOCYTES NFR BLD AUTO: 1 % (ref 0–0.5)
LYMPHOCYTES # BLD: 0.7 K/UL (ref 0.8–3.5)
LYMPHOCYTES NFR BLD: 4 % (ref 12–49)
Lab: NORMAL
MAGNESIUM SERPL-MCNC: 1.9 MG/DL (ref 1.6–2.4)
MCH RBC QN AUTO: 28.6 PG (ref 26–34)
MCHC RBC AUTO-ENTMCNC: 32.8 G/DL (ref 30–36.5)
MCV RBC AUTO: 87.3 FL (ref 80–99)
METHADONE UR QL: NEGATIVE
MONOCYTES # BLD: 0.2 K/UL (ref 0–1)
MONOCYTES NFR BLD: 1 % (ref 5–13)
NEUTS SEG # BLD: 16 K/UL (ref 1.8–8)
NEUTS SEG NFR BLD: 94 % (ref 32–75)
NRBC # BLD: 0 K/UL (ref 0–0.01)
NRBC BLD-RTO: 0 PER 100 WBC
NT PRO BNP: 8 PG/ML (ref 0–125)
OPIATES UR QL: NEGATIVE
PCP UR QL: NEGATIVE
PLATELET # BLD AUTO: 618 K/UL (ref 150–400)
PMV BLD AUTO: 9.5 FL (ref 8.9–12.9)
POTASSIUM SERPL-SCNC: 4.1 MMOL/L (ref 3.5–5.1)
PROT SERPL-MCNC: 9.2 G/DL (ref 6.4–8.2)
RBC # BLD AUTO: 4.02 M/UL (ref 3.8–5.2)
RBC MORPH BLD: ABNORMAL
SODIUM SERPL-SCNC: 138 MMOL/L (ref 136–145)
TROPONIN I SERPL HS-MCNC: <4 NG/L (ref 0–51)
WBC # BLD AUTO: 17.1 K/UL (ref 3.6–11)

## 2024-09-06 PROCEDURE — 71275 CT ANGIOGRAPHY CHEST: CPT

## 2024-09-06 PROCEDURE — 6370000000 HC RX 637 (ALT 250 FOR IP): Performed by: PHYSICIAN ASSISTANT

## 2024-09-06 PROCEDURE — 6360000004 HC RX CONTRAST MEDICATION: Performed by: PHYSICIAN ASSISTANT

## 2024-09-06 PROCEDURE — 99285 EMERGENCY DEPT VISIT HI MDM: CPT

## 2024-09-06 PROCEDURE — 80053 COMPREHEN METABOLIC PANEL: CPT

## 2024-09-06 PROCEDURE — 85379 FIBRIN DEGRADATION QUANT: CPT

## 2024-09-06 PROCEDURE — 80307 DRUG TEST PRSMV CHEM ANLYZR: CPT

## 2024-09-06 PROCEDURE — 81025 URINE PREGNANCY TEST: CPT

## 2024-09-06 PROCEDURE — 85025 COMPLETE CBC W/AUTO DIFF WBC: CPT

## 2024-09-06 PROCEDURE — 84484 ASSAY OF TROPONIN QUANT: CPT

## 2024-09-06 PROCEDURE — 36415 COLL VENOUS BLD VENIPUNCTURE: CPT

## 2024-09-06 PROCEDURE — 83880 ASSAY OF NATRIURETIC PEPTIDE: CPT

## 2024-09-06 PROCEDURE — 71045 X-RAY EXAM CHEST 1 VIEW: CPT

## 2024-09-06 PROCEDURE — 83735 ASSAY OF MAGNESIUM: CPT

## 2024-09-06 RX ORDER — ACETAMINOPHEN 500 MG
1000 TABLET ORAL EVERY 6 HOURS PRN
Qty: 20 TABLET | Refills: 0 | Status: SHIPPED | OUTPATIENT
Start: 2024-09-06

## 2024-09-06 RX ORDER — MORPHINE SULFATE 2 MG/ML
1 INJECTION, SOLUTION INTRAMUSCULAR; INTRAVENOUS
Status: DISCONTINUED | OUTPATIENT
Start: 2024-09-06 | End: 2024-09-06 | Stop reason: HOSPADM

## 2024-09-06 RX ORDER — IBUPROFEN 800 MG/1
800 TABLET, FILM COATED ORAL EVERY 8 HOURS PRN
Qty: 20 TABLET | Refills: 0 | Status: SHIPPED | OUTPATIENT
Start: 2024-09-06

## 2024-09-06 RX ORDER — LORAZEPAM 1 MG/1
1 TABLET ORAL
Status: COMPLETED | OUTPATIENT
Start: 2024-09-06 | End: 2024-09-06

## 2024-09-06 RX ORDER — IOPAMIDOL 755 MG/ML
100 INJECTION, SOLUTION INTRAVASCULAR
Status: COMPLETED | OUTPATIENT
Start: 2024-09-06 | End: 2024-09-06

## 2024-09-06 RX ADMIN — IOPAMIDOL 100 ML: 755 INJECTION, SOLUTION INTRAVENOUS at 18:26

## 2024-09-06 RX ADMIN — LORAZEPAM 1 MG: 1 TABLET ORAL at 17:52

## 2024-09-06 ASSESSMENT — PAIN DESCRIPTION - PAIN TYPE: TYPE: ACUTE PAIN

## 2024-09-06 ASSESSMENT — ENCOUNTER SYMPTOMS
SHORTNESS OF BREATH: 1
NAUSEA: 0
ABDOMINAL PAIN: 0
CHEST TIGHTNESS: 0
EYE PAIN: 0
DIARRHEA: 0
VOMITING: 0
BACK PAIN: 0
RHINORRHEA: 0
SORE THROAT: 0
WHEEZING: 0
COUGH: 0

## 2024-09-06 ASSESSMENT — PAIN - FUNCTIONAL ASSESSMENT: PAIN_FUNCTIONAL_ASSESSMENT: 0-10

## 2024-09-06 ASSESSMENT — PAIN DESCRIPTION - DESCRIPTORS: DESCRIPTORS: PRESSURE

## 2024-09-06 ASSESSMENT — PAIN DESCRIPTION - LOCATION: LOCATION: CHEST

## 2024-09-06 ASSESSMENT — PAIN SCALES - GENERAL: PAINLEVEL_OUTOF10: 3

## 2024-09-06 NOTE — ED PROVIDER NOTES
[facility] EMERGENCY DEPT  EMERGENCY DEPARTMENT ENCOUNTER         Pt Name: Sohail Gary  MRN: [id number]  Birthdate 1985  Date of evaluation: 9/6/2024  Provider: Narcisa Jones PA-C   PCP: Kurt Victoria MD  Note Started: 3:12 PM EDT on 9/6/24     CHIEF COMPLAINT       Chief Complaint   Patient presents with    Chest Pain     Concern for rapid heart rate, recent a-flutter at another facility        HISTORY OF PRESENT ILLNESS: 1 or more elements      History From: Patient  None     Sohail Gary is a 38 y.o. female with medical history significant for substance abuse disorder, cocaine abuse, alcohol use disorder, chronic anemia, Bell's palsy, depression who presents via self with complaints of acute moderate recurrent substernal chest tightness/pain with associated shortness of breath that has been intermittent over the last 3 weeks but persistent to get over the last couple of days.  Denies any associated fever, chills, nausea, vomiting, diarrhea, syncope, seizure, numbness, tingling, focal weakness, lightheadedness or dizziness.  She does have a history of anxiety as well as right-sided facial palsy that also radiates to the left (?).  She was previously prescribed steroids as well as propranolol, but these have been discontinued.  She has been to multiple ED's for her symptoms over the last couple of weeks.  She does have a history of cocaine abuse, but has not been using any illicit substance of recent.  Patient also appears to have been diagnosed with COVID-19 in July 2024.  Since this diagnosis, she has had multiple ED visits.  She did not have a workup for bradycardia on [facility] at [facility] and then was evaluated again on 9/3/2024 and had an episode of atrial flutter.  She currently has a cardiac monitor in place and is followed by cardiology outpatient, but she has not talked to them last couple of days.  Per chart review, patient did have a cardiac monitor placed on 8/23/2024 and has a follow-up

## 2024-09-06 NOTE — ED NOTES
Pt presents to ED complaining of intermittent tightness and mid-sternal chest pain that radiates to the back x3 weeks. Pt denies any N/V/D. Pt endorses hx of anxiety and right sided facial palsy that radiates to the left. Pt reports having taken their prescribed baby aspirin, amoxicillin, and prednisone despite the medication being discontinued prior to their arrival today.      Pt is alert and oriented x 4, RR even and unlabored, skin is warm and dry. Pt appears in NAD at this time. Assessment completed and pt updated on plan of care.  Call bell in reach.  Emergency Department Nursing Plan of Care  The Nursing Plan of Care is developed from the Nursing assessment and Emergency Department Attending provider initial evaluation.  The plan of care may be reviewed in the “ED Provider note”.  The Plan of Care was developed with the following considerations:  Patient / Family readiness to learn indicated by:Refer to Medical chart in [facility]  Persons(s) to be included in education: Refer to Medical chart in [facility]  Barriers to Learning/Limitations:Normal    Baby aspirin, prednisone (not supposed to), Amoxicillin,

## 2024-09-06 NOTE — ED NOTES
Discharge instructions were given to the patient by López Locke Student RN.  The patient left the Emergency Department alert and oriented and in no acute distress with 2 prescription(s). The patient was encouraged to call or return to the ED for worsening issues or problems and was encouraged to schedule a follow up appointment for continuing care.  The patient verbalized understanding of discharge instructions and prescriptions; all questions were answered. The patient has no further concerns at this time.

## 2024-09-11 ENCOUNTER — HOSPITAL ENCOUNTER (EMERGENCY)
Facility: HOSPITAL | Age: 39
Discharge: HOME | End: 2024-09-11
Attending: EMERGENCY MEDICINE
Payer: MEDICAID

## 2024-09-11 VITALS
BODY MASS INDEX: 21.35 KG/M2 | HEART RATE: 85 BPM | RESPIRATION RATE: 18 BRPM | TEMPERATURE: 98.2 F | DIASTOLIC BLOOD PRESSURE: 73 MMHG | HEIGHT: 67 IN | WEIGHT: 136 LBS | SYSTOLIC BLOOD PRESSURE: 110 MMHG | OXYGEN SATURATION: 100 %

## 2024-09-11 DIAGNOSIS — R07.89 CHEST WALL PAIN: ICD-10-CM

## 2024-09-11 DIAGNOSIS — R07.89 ATYPICAL CHEST PAIN: Primary | ICD-10-CM

## 2024-09-11 LAB
EKG ATRIAL RATE: 80 BPM
EKG DIAGNOSIS: NORMAL
EKG P AXIS: 71 DEGREES
EKG P-R INTERVAL: 172 MS
EKG Q-T INTERVAL: 370 MS
EKG QRS DURATION: 82 MS
EKG QTC CALCULATION (BAZETT): 426 MS
EKG R AXIS: 67 DEGREES
EKG T AXIS: 63 DEGREES
EKG VENTRICULAR RATE: 80 BPM

## 2024-09-11 PROCEDURE — 93005 ELECTROCARDIOGRAM TRACING: CPT | Performed by: EMERGENCY MEDICINE

## 2024-09-11 PROCEDURE — 99283 EMERGENCY DEPT VISIT LOW MDM: CPT

## 2024-09-11 PROCEDURE — 93010 ELECTROCARDIOGRAM REPORT: CPT | Performed by: SPECIALIST

## 2024-09-11 ASSESSMENT — PAIN SCALES - GENERAL: PAINLEVEL_OUTOF10: 3

## 2024-09-11 ASSESSMENT — PAIN DESCRIPTION - DESCRIPTORS: DESCRIPTORS: SHARP;SQUEEZING

## 2024-09-11 ASSESSMENT — PAIN - FUNCTIONAL ASSESSMENT: PAIN_FUNCTIONAL_ASSESSMENT: 0-10

## 2024-09-11 ASSESSMENT — PAIN DESCRIPTION - ORIENTATION: ORIENTATION: MID

## 2024-09-11 ASSESSMENT — PAIN DESCRIPTION - LOCATION: LOCATION: CHEST

## 2024-09-12 ENCOUNTER — OFFICE VISIT (OUTPATIENT)
Age: 39
End: 2024-09-12

## 2024-09-12 VITALS
SYSTOLIC BLOOD PRESSURE: 115 MMHG | OXYGEN SATURATION: 99 % | HEART RATE: 106 BPM | RESPIRATION RATE: 17 BRPM | BODY MASS INDEX: 21.3 KG/M2 | WEIGHT: 136 LBS | DIASTOLIC BLOOD PRESSURE: 76 MMHG | TEMPERATURE: 98.5 F

## 2024-09-12 DIAGNOSIS — G57.02 SCIATIC NEUROPATHY, LEFT: Primary | ICD-10-CM

## 2024-09-12 DIAGNOSIS — R03.0 ELEVATED BLOOD PRESSURE READING: ICD-10-CM

## 2024-09-12 RX ORDER — NAPROXEN 500 MG/1
500 TABLET ORAL 2 TIMES DAILY PRN
Qty: 60 TABLET | Refills: 0 | Status: SHIPPED | OUTPATIENT
Start: 2024-09-12

## 2024-09-12 ASSESSMENT — ENCOUNTER SYMPTOMS
ALLERGIC/IMMUNOLOGIC NEGATIVE: 1
RESPIRATORY NEGATIVE: 1
EYES NEGATIVE: 1
GASTROINTESTINAL NEGATIVE: 1

## 2024-09-20 ENCOUNTER — OFFICE VISIT (OUTPATIENT)
Age: 39
End: 2024-09-20

## 2024-09-20 ENCOUNTER — TELEPHONE (OUTPATIENT)
Age: 39
End: 2024-09-20

## 2024-09-20 VITALS
TEMPERATURE: 98.6 F | SYSTOLIC BLOOD PRESSURE: 107 MMHG | RESPIRATION RATE: 18 BRPM | HEART RATE: 86 BPM | DIASTOLIC BLOOD PRESSURE: 71 MMHG | OXYGEN SATURATION: 98 % | BODY MASS INDEX: 21.14 KG/M2 | WEIGHT: 135 LBS

## 2024-09-20 DIAGNOSIS — L20.9 ATOPIC DERMATITIS OF FACE: Primary | ICD-10-CM

## 2024-09-20 DIAGNOSIS — R06.02 SOB (SHORTNESS OF BREATH): ICD-10-CM

## 2024-09-20 RX ORDER — HYDRALAZINE HYDROCHLORIDE 50 MG/1
50 TABLET, FILM COATED ORAL 3 TIMES DAILY
COMMUNITY

## 2024-09-20 RX ORDER — DILTIAZEM HYDROCHLORIDE 180 MG/1
180 CAPSULE, EXTENDED RELEASE ORAL DAILY
COMMUNITY

## 2024-09-20 RX ORDER — TRIAMCINOLONE ACETONIDE 0.25 MG/G
OINTMENT TOPICAL 2 TIMES DAILY
Qty: 15 G | Refills: 1 | Status: SHIPPED | OUTPATIENT
Start: 2024-09-20

## 2024-09-20 ASSESSMENT — ENCOUNTER SYMPTOMS
COUGH: 1
EYES NEGATIVE: 1
GASTROINTESTINAL NEGATIVE: 1
ALLERGIC/IMMUNOLOGIC NEGATIVE: 1
SHORTNESS OF BREATH: 1

## 2024-09-21 ENCOUNTER — HOSPITAL ENCOUNTER (EMERGENCY)
Facility: HOSPITAL | Age: 39
Discharge: HOME | End: 2024-09-21
Payer: MEDICAID

## 2024-09-21 VITALS
SYSTOLIC BLOOD PRESSURE: 114 MMHG | WEIGHT: 135 LBS | DIASTOLIC BLOOD PRESSURE: 77 MMHG | TEMPERATURE: 98.9 F | RESPIRATION RATE: 16 BRPM | BODY MASS INDEX: 21.14 KG/M2 | OXYGEN SATURATION: 99 % | HEART RATE: 93 BPM

## 2024-09-21 DIAGNOSIS — Z86.39 HISTORY OF VITAMIN D DEFICIENCY: ICD-10-CM

## 2024-09-21 DIAGNOSIS — D50.9 IRON DEFICIENCY ANEMIA, UNSPECIFIED IRON DEFICIENCY ANEMIA TYPE: Primary | ICD-10-CM

## 2024-09-21 LAB
ALBUMIN SERPL-MCNC: 3.7 G/DL (ref 3.5–5)
ALBUMIN/GLOB SERPL: 0.9 (ref 1.1–2.2)
ALP SERPL-CCNC: 81 U/L (ref 45–117)
ALT SERPL-CCNC: 16 U/L (ref 12–78)
ANION GAP SERPL CALC-SCNC: 10 MMOL/L (ref 2–12)
AST SERPL-CCNC: 12 U/L (ref 15–37)
BASOPHILS # BLD: 0 K/UL (ref 0–0.1)
BASOPHILS NFR BLD: 0 % (ref 0–1)
BILIRUB SERPL-MCNC: 0.3 MG/DL (ref 0.2–1)
BUN SERPL-MCNC: 12 MG/DL (ref 6–20)
BUN/CREAT SERPL: 17 (ref 12–20)
CALCIUM SERPL-MCNC: 8.9 MG/DL (ref 8.5–10.1)
CHLORIDE SERPL-SCNC: 106 MMOL/L (ref 97–108)
CO2 SERPL-SCNC: 27 MMOL/L (ref 21–32)
CREAT SERPL-MCNC: 0.7 MG/DL (ref 0.55–1.02)
DIFFERENTIAL METHOD BLD: ABNORMAL
EOSINOPHIL # BLD: 0 K/UL (ref 0–0.4)
EOSINOPHIL NFR BLD: 1 % (ref 0–7)
ERYTHROCYTE [DISTWIDTH] IN BLOOD BY AUTOMATED COUNT: 15.2 % (ref 11.5–14.5)
GLOBULIN SER CALC-MCNC: 4 G/DL (ref 2–4)
GLUCOSE SERPL-MCNC: 136 MG/DL (ref 65–100)
HCT VFR BLD AUTO: 30.2 % (ref 35–47)
HGB BLD-MCNC: 9.3 G/DL (ref 11.5–16)
IMM GRANULOCYTES # BLD AUTO: 0 K/UL (ref 0–0.04)
IMM GRANULOCYTES NFR BLD AUTO: 0 % (ref 0–0.5)
LYMPHOCYTES # BLD: 1.8 K/UL (ref 0.8–3.5)
LYMPHOCYTES NFR BLD: 24 % (ref 12–49)
MCH RBC QN AUTO: 27.9 PG (ref 26–34)
MCHC RBC AUTO-ENTMCNC: 30.8 G/DL (ref 30–36.5)
MCV RBC AUTO: 90.7 FL (ref 80–99)
MONOCYTES # BLD: 0.6 K/UL (ref 0–1)
MONOCYTES NFR BLD: 8 % (ref 5–13)
NEUTS SEG # BLD: 5.2 K/UL (ref 1.8–8)
NEUTS SEG NFR BLD: 67 % (ref 32–75)
NRBC # BLD: 0 K/UL (ref 0–0.01)
NRBC BLD-RTO: 0 PER 100 WBC
PLATELET # BLD AUTO: 335 K/UL (ref 150–400)
PMV BLD AUTO: 8.5 FL (ref 8.9–12.9)
POTASSIUM SERPL-SCNC: 3.5 MMOL/L (ref 3.5–5.1)
PROT SERPL-MCNC: 7.7 G/DL (ref 6.4–8.2)
RBC # BLD AUTO: 3.33 M/UL (ref 3.8–5.2)
SODIUM SERPL-SCNC: 143 MMOL/L (ref 136–145)
WBC # BLD AUTO: 7.7 K/UL (ref 3.6–11)

## 2024-09-21 PROCEDURE — 85025 COMPLETE CBC W/AUTO DIFF WBC: CPT

## 2024-09-21 PROCEDURE — 82306 VITAMIN D 25 HYDROXY: CPT

## 2024-09-21 PROCEDURE — 80053 COMPREHEN METABOLIC PANEL: CPT

## 2024-09-21 PROCEDURE — 36415 COLL VENOUS BLD VENIPUNCTURE: CPT

## 2024-09-21 PROCEDURE — 99283 EMERGENCY DEPT VISIT LOW MDM: CPT

## 2024-09-21 RX ORDER — FERROUS SULFATE 325(65) MG
325 TABLET ORAL 2 TIMES DAILY
Qty: 60 TABLET | Refills: 0 | Status: SHIPPED | OUTPATIENT
Start: 2024-09-21

## 2024-09-21 RX ORDER — DOCUSATE SODIUM 100 MG/1
100 CAPSULE, LIQUID FILLED ORAL 2 TIMES DAILY
Qty: 60 CAPSULE | Refills: 0 | Status: SHIPPED | OUTPATIENT
Start: 2024-09-21 | End: 2024-10-21

## 2024-09-21 RX ORDER — ERGOCALCIFEROL 1.25 MG/1
50000 CAPSULE, LIQUID FILLED ORAL WEEKLY
Qty: 12 CAPSULE | Refills: 0 | Status: SHIPPED | OUTPATIENT
Start: 2024-09-21

## 2024-09-21 ASSESSMENT — PAIN DESCRIPTION - DESCRIPTORS: DESCRIPTORS: DULL

## 2024-09-21 ASSESSMENT — PAIN - FUNCTIONAL ASSESSMENT: PAIN_FUNCTIONAL_ASSESSMENT: 0-10

## 2024-09-21 ASSESSMENT — PAIN DESCRIPTION - PAIN TYPE: TYPE: ACUTE PAIN

## 2024-09-21 ASSESSMENT — PAIN SCALES - GENERAL: PAINLEVEL_OUTOF10: 3

## 2024-09-21 ASSESSMENT — PAIN DESCRIPTION - ORIENTATION: ORIENTATION: RIGHT

## 2024-09-21 ASSESSMENT — PAIN DESCRIPTION - LOCATION: LOCATION: SHOULDER

## 2024-09-22 LAB — 25(OH)D3 SERPL-MCNC: <9 NG/ML (ref 30–100)

## 2024-11-18 RX ORDER — HYDROXYZINE HYDROCHLORIDE 50 MG/1
50 TABLET, FILM COATED ORAL 3 TIMES DAILY PRN
COMMUNITY

## 2024-11-18 RX ORDER — MONTELUKAST SODIUM 10 MG/1
10 TABLET ORAL NIGHTLY
COMMUNITY

## 2024-11-18 RX ORDER — FLUTICASONE PROPIONATE 50 MCG
1 SPRAY, SUSPENSION (ML) NASAL DAILY PRN
COMMUNITY

## 2024-11-18 RX ORDER — ESCITALOPRAM OXALATE 5 MG/1
5 TABLET ORAL DAILY
COMMUNITY

## 2024-11-18 NOTE — PERIOP NOTE
[facility]  Preoperative Instructions        Surgery Date 11/22/24          Time of Arrival:  To Be Called  Contact # [phone]    On the day of your surgery, please report to Surgical Services Registration Desk and sign in at your designated time.  The Surgery Center is located to the right of the Emergency Room.     2. You must have someone with you to drive you home. You should not drive a car for 24 hours following surgery. Please make arrangements for a friend or family member to stay with you for the first 24 hours after your surgery.    3. Do not have anything to eat or drink (including water, gum, mints, coffee, juice) after midnight ?11/21/24?      .?This may not apply to medications prescribed by your physician. ?(Please note below the special instructions with medications to take the morning of your procedure.)    4. We recommend you do not drink any alcoholic beverages for 24 hours before and after your surgery.    5. Contact your surgeon's office for instructions on the following medications: non-steroidal anti-inflammatory drugs (i.e. Advil, Aleve), vitamins, and supplements. (Some surgeon's will want you to stop these medications prior to surgery and others may allow you to take them)  **If you are currently taking Plavix, Coumadin, Aspirin and/or other blood-thinning agents, contact your surgeon for instructions.** Your surgeon will partner with the physician prescribing these medications to determine if it is safe to stop or if you need to continue taking.  Please do not stop taking these medications without instructions from your surgeon    6. Wear comfortable clothes.  Wear glasses instead of contacts.  Do not bring any money or jewelry. Please bring picture ID, insurance card, and any prearranged co-payment or hospital payment.  Do not wear make-up, particularly mascara the morning of your surgery.  Do not wear nail polish, particularly if you are having foot /hand 
surgery:  Shower again thoroughly with an antibacterial soap, such as Dial or the soap provided at your preassessment appointment. If needed, ask someone for help to reach all areas of your body. Don’t forget to clean your belly button! Rinse.  With bottle of Hibiclens/Chlorhexidine in hand, turn water off.  Apply Hibiclens antiseptic skin cleanser with a clean, freshly washed washcloth.  Gently apply to your body from chin to toes (except the genital area) and especially the area(s) where your incision(s) will be.  Leave Hibiclens/Chlorhexidine on your skin for at least 20 seconds.     CAUTION: If needed, Hibiclens/chlorhexidine may be used to clean the folds of skin of the legs (such as in the area of the groin) and on your buttocks and hips. However, do not use Hibiclens/Chlorhexidine above the neck or in the genital area (your bottom) or put inside any area of your body.  Turn the water back on and rinse.  Dry gently with a clean, freshly washed towel.  After your shower, do not use any powder, deodorant, perfumes or lotion prior to surgery.  Put on clean, freshly washed clothing.    Tips to help prevent infections after your surgery:  Protect your surgical wound from germs:  Hand washing is the most important thing you and your caregivers can do to prevent infections.  Keep your bandage clean and dry!  Do not touch your surgical wound.  Use clean, freshly washed towels and washcloths every time you shower; do not share bath linens with others.  Until your surgical wound is healed, wear clothing and sleep on bed linens each day that are clean and freshly washed.  Do not allow pets to sleep in your bed with you or touch your surgical wound.  Do not smoke - smoking delays wound healing. This may be a good time to stop smoking.  If you have diabetes, it is important for you to manage your blood sugar levels properly before your surgery as well as after your surgery. Poorly managed blood sugar levels slow down wound

## 2024-11-22 ENCOUNTER — ANESTHESIA EVENT (OUTPATIENT)
Facility: HOSPITAL | Age: 39
End: 2024-11-22
Payer: MEDICAID

## 2024-11-22 ENCOUNTER — HOSPITAL ENCOUNTER (OUTPATIENT)
Facility: HOSPITAL | Age: 39
Setting detail: OUTPATIENT SURGERY
Discharge: HOME | End: 2024-11-22
Attending: OBSTETRICS & GYNECOLOGY | Admitting: OBSTETRICS & GYNECOLOGY
Payer: MEDICAID

## 2024-11-22 ENCOUNTER — ANESTHESIA (OUTPATIENT)
Facility: HOSPITAL | Age: 39
End: 2024-11-22
Payer: MEDICAID

## 2024-11-22 VITALS
SYSTOLIC BLOOD PRESSURE: 116 MMHG | OXYGEN SATURATION: 100 % | HEART RATE: 66 BPM | RESPIRATION RATE: 16 BRPM | DIASTOLIC BLOOD PRESSURE: 77 MMHG | HEIGHT: 67 IN | WEIGHT: 142.64 LBS | BODY MASS INDEX: 22.39 KG/M2 | TEMPERATURE: 97.9 F

## 2024-11-22 DIAGNOSIS — Z98.890 S/P D&C (STATUS POST DILATION AND CURETTAGE): Primary | ICD-10-CM

## 2024-11-22 LAB
AMPHET UR QL SCN: NEGATIVE
BARBITURATES UR QL SCN: NEGATIVE
BENZODIAZ UR QL: NEGATIVE
CANNABINOIDS UR QL SCN: NEGATIVE
COCAINE UR QL SCN: NEGATIVE
HCG UR QL: NEGATIVE
Lab: NORMAL
METHADONE UR QL: NEGATIVE
OPIATES UR QL: NEGATIVE
PCP UR QL: NEGATIVE

## 2024-11-22 PROCEDURE — 80307 DRUG TEST PRSMV CHEM ANLYZR: CPT

## 2024-11-22 PROCEDURE — 7100000010 HC PHASE II RECOVERY - FIRST 15 MIN: Performed by: OBSTETRICS & GYNECOLOGY

## 2024-11-22 PROCEDURE — 6370000000 HC RX 637 (ALT 250 FOR IP): Performed by: ANESTHESIOLOGY

## 2024-11-22 PROCEDURE — 3600000004 HC SURGERY LEVEL 4 BASE: Performed by: OBSTETRICS & GYNECOLOGY

## 2024-11-22 PROCEDURE — 2709999900 HC NON-CHARGEABLE SUPPLY: Performed by: OBSTETRICS & GYNECOLOGY

## 2024-11-22 PROCEDURE — 6370000000 HC RX 637 (ALT 250 FOR IP): Performed by: OBSTETRICS & GYNECOLOGY

## 2024-11-22 PROCEDURE — 81025 URINE PREGNANCY TEST: CPT

## 2024-11-22 PROCEDURE — 3700000000 HC ANESTHESIA ATTENDED CARE: Performed by: OBSTETRICS & GYNECOLOGY

## 2024-11-22 PROCEDURE — 88305 TISSUE EXAM BY PATHOLOGIST: CPT

## 2024-11-22 PROCEDURE — 85018 HEMOGLOBIN: CPT

## 2024-11-22 PROCEDURE — 3600000014 HC SURGERY LEVEL 4 ADDTL 15MIN: Performed by: OBSTETRICS & GYNECOLOGY

## 2024-11-22 PROCEDURE — 7100000001 HC PACU RECOVERY - ADDTL 15 MIN: Performed by: OBSTETRICS & GYNECOLOGY

## 2024-11-22 PROCEDURE — 6360000002 HC RX W HCPCS: Performed by: REGISTERED NURSE

## 2024-11-22 PROCEDURE — 6360000002 HC RX W HCPCS: Performed by: OBSTETRICS & GYNECOLOGY

## 2024-11-22 PROCEDURE — 7100000011 HC PHASE II RECOVERY - ADDTL 15 MIN: Performed by: OBSTETRICS & GYNECOLOGY

## 2024-11-22 PROCEDURE — 2580000003 HC RX 258: Performed by: STUDENT IN AN ORGANIZED HEALTH CARE EDUCATION/TRAINING PROGRAM

## 2024-11-22 PROCEDURE — 2580000003 HC RX 258: Performed by: REGISTERED NURSE

## 2024-11-22 PROCEDURE — 3700000001 HC ADD 15 MINUTES (ANESTHESIA): Performed by: OBSTETRICS & GYNECOLOGY

## 2024-11-22 PROCEDURE — 2720000010 HC SURG SUPPLY STERILE: Performed by: OBSTETRICS & GYNECOLOGY

## 2024-11-22 PROCEDURE — 7100000000 HC PACU RECOVERY - FIRST 15 MIN: Performed by: OBSTETRICS & GYNECOLOGY

## 2024-11-22 RX ORDER — FENTANYL CITRATE 50 UG/ML
INJECTION, SOLUTION INTRAMUSCULAR; INTRAVENOUS
Status: DISCONTINUED | OUTPATIENT
Start: 2024-11-22 | End: 2024-11-22 | Stop reason: SDUPTHER

## 2024-11-22 RX ORDER — NALOXONE HYDROCHLORIDE 0.4 MG/ML
INJECTION, SOLUTION INTRAMUSCULAR; INTRAVENOUS; SUBCUTANEOUS PRN
Status: DISCONTINUED | OUTPATIENT
Start: 2024-11-22 | End: 2024-11-22 | Stop reason: HOSPADM

## 2024-11-22 RX ORDER — LIDOCAINE HYDROCHLORIDE 10 MG/ML
INJECTION, SOLUTION INFILTRATION; PERINEURAL PRN
Status: DISCONTINUED | OUTPATIENT
Start: 2024-11-22 | End: 2024-11-22 | Stop reason: ALTCHOICE

## 2024-11-22 RX ORDER — PROPOFOL 10 MG/ML
INJECTION, EMULSION INTRAVENOUS
Status: DISCONTINUED | OUTPATIENT
Start: 2024-11-22 | End: 2024-11-22 | Stop reason: SDUPTHER

## 2024-11-22 RX ORDER — SODIUM CHLORIDE 0.9 % (FLUSH) 0.9 %
5-40 SYRINGE (ML) INJECTION EVERY 12 HOURS SCHEDULED
Status: DISCONTINUED | OUTPATIENT
Start: 2024-11-22 | End: 2024-11-22 | Stop reason: HOSPADM

## 2024-11-22 RX ORDER — MEPERIDINE HYDROCHLORIDE 25 MG/ML
12.5 INJECTION INTRAMUSCULAR; INTRAVENOUS; SUBCUTANEOUS EVERY 5 MIN PRN
Status: DISCONTINUED | OUTPATIENT
Start: 2024-11-22 | End: 2024-11-22 | Stop reason: HOSPADM

## 2024-11-22 RX ORDER — ONDANSETRON 2 MG/ML
INJECTION INTRAMUSCULAR; INTRAVENOUS
Status: DISCONTINUED | OUTPATIENT
Start: 2024-11-22 | End: 2024-11-22 | Stop reason: SDUPTHER

## 2024-11-22 RX ORDER — DEXAMETHASONE SODIUM PHOSPHATE 4 MG/ML
INJECTION, SOLUTION INTRA-ARTICULAR; INTRALESIONAL; INTRAMUSCULAR; INTRAVENOUS; SOFT TISSUE
Status: DISCONTINUED | OUTPATIENT
Start: 2024-11-22 | End: 2024-11-22 | Stop reason: SDUPTHER

## 2024-11-22 RX ORDER — KETOROLAC TROMETHAMINE 30 MG/ML
INJECTION, SOLUTION INTRAMUSCULAR; INTRAVENOUS
Status: DISCONTINUED | OUTPATIENT
Start: 2024-11-22 | End: 2024-11-22 | Stop reason: SDUPTHER

## 2024-11-22 RX ORDER — OXYCODONE AND ACETAMINOPHEN 5; 325 MG/1; MG/1
1 TABLET ORAL EVERY 6 HOURS PRN
Qty: 12 TABLET | Refills: 0 | Status: SHIPPED | OUTPATIENT
Start: 2024-11-22 | End: 2024-11-25

## 2024-11-22 RX ORDER — SODIUM CHLORIDE, SODIUM LACTATE, POTASSIUM CHLORIDE, CALCIUM CHLORIDE 600; 310; 30; 20 MG/100ML; MG/100ML; MG/100ML; MG/100ML
INJECTION, SOLUTION INTRAVENOUS ONCE
Status: COMPLETED | OUTPATIENT
Start: 2024-11-22 | End: 2024-11-22

## 2024-11-22 RX ORDER — PROCHLORPERAZINE EDISYLATE 5 MG/ML
5 INJECTION INTRAMUSCULAR; INTRAVENOUS
Status: DISCONTINUED | OUTPATIENT
Start: 2024-11-22 | End: 2024-11-22 | Stop reason: HOSPADM

## 2024-11-22 RX ORDER — ONDANSETRON 2 MG/ML
4 INJECTION INTRAMUSCULAR; INTRAVENOUS
Status: DISCONTINUED | OUTPATIENT
Start: 2024-11-22 | End: 2024-11-22 | Stop reason: HOSPADM

## 2024-11-22 RX ORDER — SODIUM CHLORIDE 9 MG/ML
INJECTION, SOLUTION INTRAVENOUS PRN
Status: DISCONTINUED | OUTPATIENT
Start: 2024-11-22 | End: 2024-11-22 | Stop reason: HOSPADM

## 2024-11-22 RX ORDER — SODIUM CHLORIDE, SODIUM LACTATE, POTASSIUM CHLORIDE, CALCIUM CHLORIDE 600; 310; 30; 20 MG/100ML; MG/100ML; MG/100ML; MG/100ML
INJECTION, SOLUTION INTRAVENOUS
Status: DISCONTINUED | OUTPATIENT
Start: 2024-11-22 | End: 2024-11-22 | Stop reason: SDUPTHER

## 2024-11-22 RX ORDER — MIDAZOLAM HYDROCHLORIDE 1 MG/ML
INJECTION, SOLUTION INTRAMUSCULAR; INTRAVENOUS
Status: DISCONTINUED | OUTPATIENT
Start: 2024-11-22 | End: 2024-11-22 | Stop reason: SDUPTHER

## 2024-11-22 RX ORDER — IBUPROFEN 800 MG/1
800 TABLET, FILM COATED ORAL 3 TIMES DAILY PRN
Qty: 270 TABLET | Refills: 1 | Status: SHIPPED | OUTPATIENT
Start: 2024-11-22

## 2024-11-22 RX ORDER — FENTANYL CITRATE 50 UG/ML
50 INJECTION, SOLUTION INTRAMUSCULAR; INTRAVENOUS EVERY 5 MIN PRN
Status: DISCONTINUED | OUTPATIENT
Start: 2024-11-22 | End: 2024-11-22 | Stop reason: HOSPADM

## 2024-11-22 RX ORDER — SODIUM CHLORIDE 0.9 % (FLUSH) 0.9 %
5-40 SYRINGE (ML) INJECTION PRN
Status: DISCONTINUED | OUTPATIENT
Start: 2024-11-22 | End: 2024-11-22 | Stop reason: HOSPADM

## 2024-11-22 RX ORDER — HYDROMORPHONE HYDROCHLORIDE 1 MG/ML
0.5 INJECTION, SOLUTION INTRAMUSCULAR; INTRAVENOUS; SUBCUTANEOUS EVERY 5 MIN PRN
Status: DISCONTINUED | OUTPATIENT
Start: 2024-11-22 | End: 2024-11-22 | Stop reason: HOSPADM

## 2024-11-22 RX ORDER — OXYCODONE HYDROCHLORIDE 5 MG/1
10 TABLET ORAL ONCE
Status: COMPLETED | OUTPATIENT
Start: 2024-11-22 | End: 2024-11-22

## 2024-11-22 RX ADMIN — FENTANYL CITRATE 50 MCG: 50 INJECTION, SOLUTION INTRAMUSCULAR; INTRAVENOUS at 12:13

## 2024-11-22 RX ADMIN — FENTANYL CITRATE 25 MCG: 50 INJECTION, SOLUTION INTRAMUSCULAR; INTRAVENOUS at 12:59

## 2024-11-22 RX ADMIN — MIDAZOLAM HYDROCHLORIDE 2 MG: 1 INJECTION, SOLUTION INTRAMUSCULAR; INTRAVENOUS at 12:09

## 2024-11-22 RX ADMIN — PROPOFOL 50 MG: 10 INJECTION, EMULSION INTRAVENOUS at 12:16

## 2024-11-22 RX ADMIN — LIDOCAINE HYDROCHLORIDE 60 MG: 20 INJECTION, SOLUTION EPIDURAL; INFILTRATION; INTRACAUDAL; PERINEURAL at 12:13

## 2024-11-22 RX ADMIN — Medication 3 AMPULE: at 11:08

## 2024-11-22 RX ADMIN — DEXAMETHASONE SODIUM PHOSPHATE 4 MG: 4 INJECTION INTRA-ARTICULAR; INTRALESIONAL; INTRAMUSCULAR; INTRAVENOUS; SOFT TISSUE at 12:27

## 2024-11-22 RX ADMIN — PROPOFOL 200 MG: 10 INJECTION, EMULSION INTRAVENOUS at 12:13

## 2024-11-22 RX ADMIN — KETOROLAC TROMETHAMINE 30 MG: 30 INJECTION, SOLUTION INTRAMUSCULAR at 12:46

## 2024-11-22 RX ADMIN — SODIUM CHLORIDE, POTASSIUM CHLORIDE, SODIUM LACTATE AND CALCIUM CHLORIDE: 600; 310; 30; 20 INJECTION, SOLUTION INTRAVENOUS at 11:57

## 2024-11-22 RX ADMIN — SODIUM CHLORIDE, POTASSIUM CHLORIDE, SODIUM LACTATE AND CALCIUM CHLORIDE: 600; 310; 30; 20 INJECTION, SOLUTION INTRAVENOUS at 11:08

## 2024-11-22 RX ADMIN — FENTANYL CITRATE 25 MCG: 50 INJECTION, SOLUTION INTRAMUSCULAR; INTRAVENOUS at 12:42

## 2024-11-22 RX ADMIN — OXYCODONE 10 MG: 5 TABLET ORAL at 13:29

## 2024-11-22 RX ADMIN — ONDANSETRON 4 MG: 2 INJECTION INTRAMUSCULAR; INTRAVENOUS at 12:27

## 2024-11-22 ASSESSMENT — PAIN SCALES - GENERAL: PAINLEVEL_OUTOF10: 0

## 2024-11-22 NOTE — ANESTHESIA POSTPROCEDURE EVALUATION
Department of Anesthesiology  Postprocedure Note    Patient: Sohail Gary  MRN: [id number]  YOB: 1985  Date of evaluation: 11/22/2024    Procedure Summary       Date: 11/22/24 Room / Location: \[facility]\"" MAIN OR M3 / [facility] MAIN OR    Anesthesia Start: 1209 Anesthesia Stop: 1308    Procedure: HYSTEROSCOPY, DILATATION AND CURETTAGE (Vagina ) Diagnosis:       Menorrhagia with regular cycle      (Menorrhagia with regular cycle [N92.0])    Providers: Kellie Hernandez MD Responsible Provider: Domingo Barry MD    Anesthesia Type: General ASA Status: 2            Anesthesia Type: General    Maurizio Phase I: Maurizio Score: 10    Maurizio Phase II:      Anesthesia Post Evaluation    Patient location during evaluation: PACU  Patient participation: complete - patient participated  Level of consciousness: sleepy but conscious and responsive to verbal stimuli  Airway patency: patent  Nausea & Vomiting: no vomiting and no nausea  Cardiovascular status: blood pressure returned to baseline and hemodynamically stable  Respiratory status: acceptable  Hydration status: stable    No notable events documented.

## 2024-11-22 NOTE — OP NOTE
[facility]              [address] [place]  [address]                            OPERATIVE REPORT      PATIENT NAME: IVANIA DE LA O          : 1985  MED REC NO: [id number]                       ROOM: OR  ACCOUNT NO: [id number]                       ADMIT DATE: 2024  PROVIDER: Kellie Hernandez MD    DATE OF SERVICE:  2024    PREOPERATIVE DIAGNOSES:  Menorrhagia.    POSTOPERATIVE DIAGNOSES:       1. Menorrhagia.     2. Status post hysteroscopy, dilation and curettage with MyoSure.    PROCEDURES PERFORMED:  Hysteroscopy, dilation and curettage with MyoSure.    SURGEON:  Kellie Hernandez MD    ASSISTANT:  None.    ANESTHESIA:  General.    ESTIMATED BLOOD LOSS:  20 mL.    SPECIMENS REMOVED:  Pathology:  Endometrial curettings.    INTRAOPERATIVE FINDINGS:  Uterus sounded to 8 cm.  Moderate endometrial thickening posteriorly.     COMPLICATIONS:  None.    IMPLANTS:  None.    INDICATIONS:  The patient is a pleasant 38-year-old female, who came to the attention of Gyn services secondary to complaints of menorrhagia.  The patient had medical management and surgical management options discussed with her.  The patient desired definitive surgical management with hysterectomy.  The patient has a requirement; prior to hysterectomy, needed endometrial sampling.  Attempts in the office failed at retrieval of endometrial biopsy secondary to the patient's discomfort.  Therefore, in order to obtain adequate sampling of the endometrial lining, the patient was scheduled for hysteroscopy, dilation and curettage.  The patient was in agreement with the procedure.  The patient was counseled on the risks and benefits of the procedure.  She understood the risks could include bleeding, infection, risk of damage to nearby pelvic structures, risk of anesthesia, risk of venous thrombolic events, risk of continued symptoms, risk of need for subsequent procedures.  The patient  scheduled for the desired hysterectomy procedure.    IV FLUIDS:  700.    URINE OUTPUT:  50 mL.        FOSTER GOODRICH MD      RBR/AQS  D:  11/22/2024 13:22:28  T:  11/22/2024 13:53:49  JOB #:  [id number]

## 2024-11-22 NOTE — PROGRESS NOTES
Patient meets discharge criteria, instructions reviewed with  and patient, written instructions provided, allowed time for questions, iv removed, patient assisted with dressing and patient transported to car in wheelchair

## 2024-11-22 NOTE — BRIEF OP NOTE
BRIEF OPERATIVE NOTE    Date of Procedure: Nov 22, 2024   Preoperative Diagnosis: Menorrhagia with regular cycle [N92.0]  Postoperative Diagnosis: Menorrhagia    Procedure: Procedure(s):  HYSTEROSCOPY, DILATATION AND CURETTAGE  Surgeons and Role:     * Kellie Hernandez MD - Primary  Anesthesia: General   Estimated Blood Loss: 20  Specimens: endometrial thickening posteriorly  ID Type Source Tests Collected by Time Destination   1 : ENDOMETRIAL CURETTINGS Tissue Tissue SURGICAL PATHOLOGY Kellie Hernandez MD 11/22/2024 1125       Findings: 50   Complications: none  Implants: * No implants in log *

## 2024-11-22 NOTE — ANESTHESIA PRE PROCEDURE
Date of last liquid consumption: 11/21/24                        Date of last solid food consumption: 11/21/24    BMI:   Wt Readings from Last 3 Encounters:   11/22/24 64.7 kg (142 lb 10.2 oz)   09/21/24 61.2 kg (135 lb)   09/20/24 61.2 kg (135 lb)     Body mass index is 22.33 kg/m².    CBC:   Lab Results   Component Value Date/Time    WBC 7.7 09/21/2024 05:16 PM    RBC 3.33 09/21/2024 05:16 PM    HGB 9.3 09/21/2024 05:16 PM    HCT 30.2 09/21/2024 05:16 PM    MCV 90.7 09/21/2024 05:16 PM    RDW 15.2 09/21/2024 05:16 PM     09/21/2024 05:16 PM       CMP:   Lab Results   Component Value Date/Time     09/21/2024 05:16 PM    K 3.5 09/21/2024 05:16 PM     09/21/2024 05:16 PM    CO2 27 09/21/2024 05:16 PM    BUN 12 09/21/2024 05:16 PM    CREATININE 0.70 09/21/2024 05:16 PM    GFRAA >60 08/27/2020 08:40 PM    AGRATIO 1.0 08/27/2020 08:40 PM    LABGLOM >90 09/21/2024 05:16 PM    GLUCOSE 136 09/21/2024 05:16 PM    CALCIUM 8.9 09/21/2024 05:16 PM    BILITOT 0.3 09/21/2024 05:16 PM    ALKPHOS 81 09/21/2024 05:16 PM    ALKPHOS 55 08/27/2020 08:40 PM    AST 12 09/21/2024 05:16 PM    ALT 16 09/21/2024 05:16 PM       POC Tests: No results for input(s): \"POCGLU\", \"POCNA\", \"POCK\", \"POCCL\", \"POCBUN\", \"POCHEMO\", \"POCHCT\" in the last 72 hours.    Coags: No results found for: \"PROTIME\", \"INR\", \"APTT\"    HCG (If Applicable):   Lab Results   Component Value Date    PREGTESTUR Negative 09/06/2024    HCGQUANT [id number] (H) 02/19/2015        ABGs: No results found for: \"PHART\", \"PO2ART\", \"VPI3ENQ\", \"LOW4SAL\", \"BEART\", \"H0GKEGOT\"     Type & Screen (If Applicable):  Lab Results   Component Value Date    ABORH O POSITIVE 02/19/2015       Drug/Infectious Status (If Applicable):  No results found for: \"HIV\", \"HEPCAB\"    COVID-19 Screening (If Applicable):   Lab Results   Component Value Date/Time    COVID19 Not detected 08/12/2024 08:50 AM           Anesthesia Evaluation  Patient summary reviewed  Airway: Mallampati:

## 2024-11-27 LAB — HGB BLD-MCNC: 10.6 G/DL (ref 11.5–16)

## 2024-12-27 NOTE — PERIOP NOTE
Phone interview completed with patient.  Pre-op instructions reviewed and discussed.  Medications reviewed and instructions given on when/if to stop/take prior to surgery.  Opportunity given for patient to ask questions, and questions answered.      Patient instructed to purchase CHG soap or Hibiclens OTC and follow directions as listed; advised to use night before surgery and day of surgery.      FAX request sent to [facility] cardiology for cardiac clearance note, recent EKG and office note.  Patient states she received clearance from Dr. Shu Lal when she was seen at their office back in November.  Patient states she has tachycardia and palpitations.  There is a note in Epic for office visit but clearance is not stated.

## 2025-01-02 ENCOUNTER — OFFICE VISIT (OUTPATIENT)
Age: 40
End: 2025-01-02

## 2025-01-02 VITALS
SYSTOLIC BLOOD PRESSURE: 112 MMHG | DIASTOLIC BLOOD PRESSURE: 62 MMHG | OXYGEN SATURATION: 100 % | TEMPERATURE: 98 F | RESPIRATION RATE: 16 BRPM | HEART RATE: 68 BPM

## 2025-01-02 DIAGNOSIS — J03.80 ACUTE BACTERIAL TONSILLITIS: ICD-10-CM

## 2025-01-02 DIAGNOSIS — R07.0 THROAT PAIN IN ADULT: ICD-10-CM

## 2025-01-02 DIAGNOSIS — J03.80 ACUTE BACTERIAL TONSILLITIS: Primary | ICD-10-CM

## 2025-01-02 DIAGNOSIS — B96.89 ACUTE BACTERIAL TONSILLITIS: Primary | ICD-10-CM

## 2025-01-02 DIAGNOSIS — B96.89 ACUTE BACTERIAL TONSILLITIS: ICD-10-CM

## 2025-01-02 LAB
GROUP A STREP ANTIGEN, POC: NEGATIVE
VALID INTERNAL CONTROL, POC: NORMAL

## 2025-01-02 NOTE — PROGRESS NOTES
Patient Name: Sohail Gary   YOB: 1985   Patient Status: Established patient,   Chief Complaint: Pharyngitis (Throat pain, frequently gets strep, mostly left side x 1 week)      ____________________________________________________________________________________________    External Records Reviewed: None    Limitation to History: None    Outside Historian: None    SUBJECTIVE/OBJECTIVE:  Sohail Gary is a 39 y.o. female presents with complaint of sore throat worse to the left side with history of strep and tonsillitis.  Symptoms began 1 week(s) ago and are worsening since onset.  She reports she has surgery on the  and needs to make sure any infection is gone. The patient denies fever, difficulty breathing or swallowing, or drooling. She reports she is tolerating oral fluids well.  Patient reports taking no remedies or medications for symptoms  No other acute symptoms reported at this time.          PAST MEDICAL HISTORY:   Medical: Pt  has a past medical history of Arthritis, Other ill-defined conditions(799.89), Palpitations, and Tachycardia.  Surgical: Pt  has a past surgical history that includes  section; Tooth Extraction; and hysteroscopy (N/A, 2024).  Family: Pt family history is not on file.  Social: Pt   Social History     Socioeconomic History    Marital status:      Spouse name: Not on file    Number of children: Not on file    Years of education: Not on file    Highest education level: Not on file   Occupational History    Not on file   Tobacco Use    Smoking status: Former     Types: Cigarettes     Passive exposure: Never    Smokeless tobacco: Never    Tobacco comments:     Black and milds - does not remember how long she smoked or when she quit   Vaping Use    Vaping status: Never Used   Substance and Sexual Activity    Alcohol use: Not Currently     Alcohol/week: 21.0 standard drinks of alcohol     Types: 7 Glasses of wine, 14 Shots of liquor per

## 2025-01-06 ENCOUNTER — ANESTHESIA EVENT (OUTPATIENT)
Facility: HOSPITAL | Age: 40
End: 2025-01-06
Payer: MEDICAID

## 2025-01-07 ENCOUNTER — ANESTHESIA (OUTPATIENT)
Facility: HOSPITAL | Age: 40
End: 2025-01-07
Payer: MEDICAID

## 2025-01-07 ENCOUNTER — HOSPITAL ENCOUNTER (OUTPATIENT)
Facility: HOSPITAL | Age: 40
Setting detail: OBSERVATION
Discharge: HOME | End: 2025-01-08
Attending: OBSTETRICS & GYNECOLOGY | Admitting: OBSTETRICS & GYNECOLOGY
Payer: MEDICAID

## 2025-01-07 DIAGNOSIS — B96.89 ACUTE BACTERIAL TONSILLITIS: ICD-10-CM

## 2025-01-07 DIAGNOSIS — J03.80 ACUTE BACTERIAL TONSILLITIS: ICD-10-CM

## 2025-01-07 DIAGNOSIS — Z90.710 S/P HYSTERECTOMY: Primary | ICD-10-CM

## 2025-01-07 LAB — HCG UR QL: NEGATIVE

## 2025-01-07 PROCEDURE — 2580000003 HC RX 258: Performed by: NURSE PRACTITIONER

## 2025-01-07 PROCEDURE — 2500000003 HC RX 250 WO HCPCS: Performed by: OBSTETRICS & GYNECOLOGY

## 2025-01-07 PROCEDURE — 2720000010 HC SURG SUPPLY STERILE: Performed by: OBSTETRICS & GYNECOLOGY

## 2025-01-07 PROCEDURE — 2500000003 HC RX 250 WO HCPCS: Performed by: NURSE PRACTITIONER

## 2025-01-07 PROCEDURE — 6360000002 HC RX W HCPCS: Performed by: OBSTETRICS & GYNECOLOGY

## 2025-01-07 PROCEDURE — 81025 URINE PREGNANCY TEST: CPT

## 2025-01-07 PROCEDURE — 3600000019 HC SURGERY ROBOT ADDTL 15MIN: Performed by: OBSTETRICS & GYNECOLOGY

## 2025-01-07 PROCEDURE — 96372 THER/PROPH/DIAG INJ SC/IM: CPT

## 2025-01-07 PROCEDURE — 3700000001 HC ADD 15 MINUTES (ANESTHESIA): Performed by: OBSTETRICS & GYNECOLOGY

## 2025-01-07 PROCEDURE — 2709999900 HC NON-CHARGEABLE SUPPLY: Performed by: OBSTETRICS & GYNECOLOGY

## 2025-01-07 PROCEDURE — G0378 HOSPITAL OBSERVATION PER HR: HCPCS

## 2025-01-07 PROCEDURE — S2900 ROBOTIC SURGICAL SYSTEM: HCPCS | Performed by: OBSTETRICS & GYNECOLOGY

## 2025-01-07 PROCEDURE — 6360000002 HC RX W HCPCS: Performed by: NURSE PRACTITIONER

## 2025-01-07 PROCEDURE — 88307 TISSUE EXAM BY PATHOLOGIST: CPT

## 2025-01-07 PROCEDURE — 3700000000 HC ANESTHESIA ATTENDED CARE: Performed by: OBSTETRICS & GYNECOLOGY

## 2025-01-07 PROCEDURE — 7100000001 HC PACU RECOVERY - ADDTL 15 MIN: Performed by: OBSTETRICS & GYNECOLOGY

## 2025-01-07 PROCEDURE — 3600000009 HC SURGERY ROBOT BASE: Performed by: OBSTETRICS & GYNECOLOGY

## 2025-01-07 PROCEDURE — 7100000000 HC PACU RECOVERY - FIRST 15 MIN: Performed by: OBSTETRICS & GYNECOLOGY

## 2025-01-07 PROCEDURE — 6360000002 HC RX W HCPCS: Performed by: ANESTHESIOLOGY

## 2025-01-07 PROCEDURE — 6370000000 HC RX 637 (ALT 250 FOR IP): Performed by: OBSTETRICS & GYNECOLOGY

## 2025-01-07 PROCEDURE — 6370000000 HC RX 637 (ALT 250 FOR IP): Performed by: ANESTHESIOLOGY

## 2025-01-07 PROCEDURE — 6370000000 HC RX 637 (ALT 250 FOR IP): Performed by: NURSE PRACTITIONER

## 2025-01-07 RX ORDER — SODIUM CHLORIDE, SODIUM LACTATE, POTASSIUM CHLORIDE, CALCIUM CHLORIDE 600; 310; 30; 20 MG/100ML; MG/100ML; MG/100ML; MG/100ML
INJECTION, SOLUTION INTRAVENOUS
Status: DISCONTINUED | OUTPATIENT
Start: 2025-01-07 | End: 2025-01-07 | Stop reason: SDUPTHER

## 2025-01-07 RX ORDER — OXYCODONE HYDROCHLORIDE 5 MG/1
5 TABLET ORAL
Status: DISCONTINUED | OUTPATIENT
Start: 2025-01-07 | End: 2025-01-07 | Stop reason: HOSPADM

## 2025-01-07 RX ORDER — KETOROLAC TROMETHAMINE 30 MG/ML
INJECTION, SOLUTION INTRAMUSCULAR; INTRAVENOUS
Status: DISCONTINUED | OUTPATIENT
Start: 2025-01-07 | End: 2025-01-07 | Stop reason: SDUPTHER

## 2025-01-07 RX ORDER — SODIUM CHLORIDE 9 MG/ML
INJECTION, SOLUTION INTRAVENOUS PRN
Status: DISCONTINUED | OUTPATIENT
Start: 2025-01-07 | End: 2025-01-07 | Stop reason: HOSPADM

## 2025-01-07 RX ORDER — SUCCINYLCHOLINE/SOD CL,ISO/PF 200MG/10ML
SYRINGE (ML) INTRAVENOUS
Status: DISCONTINUED | OUTPATIENT
Start: 2025-01-07 | End: 2025-01-07 | Stop reason: SDUPTHER

## 2025-01-07 RX ORDER — ONDANSETRON 2 MG/ML
INJECTION INTRAMUSCULAR; INTRAVENOUS
Status: DISCONTINUED | OUTPATIENT
Start: 2025-01-07 | End: 2025-01-07 | Stop reason: SDUPTHER

## 2025-01-07 RX ORDER — SODIUM CHLORIDE, SODIUM LACTATE, POTASSIUM CHLORIDE, CALCIUM CHLORIDE 600; 310; 30; 20 MG/100ML; MG/100ML; MG/100ML; MG/100ML
INJECTION, SOLUTION INTRAVENOUS CONTINUOUS
Status: DISCONTINUED | OUTPATIENT
Start: 2025-01-07 | End: 2025-01-08 | Stop reason: HOSPADM

## 2025-01-07 RX ORDER — LIDOCAINE HYDROCHLORIDE 10 MG/ML
1 INJECTION, SOLUTION EPIDURAL; INFILTRATION; INTRACAUDAL; PERINEURAL
Status: DISCONTINUED | OUTPATIENT
Start: 2025-01-07 | End: 2025-01-07 | Stop reason: HOSPADM

## 2025-01-07 RX ORDER — METRONIDAZOLE 500 MG/100ML
500 INJECTION, SOLUTION INTRAVENOUS ONCE
Status: COMPLETED | OUTPATIENT
Start: 2025-01-07 | End: 2025-01-07

## 2025-01-07 RX ORDER — HYDROMORPHONE HYDROCHLORIDE 1 MG/ML
0.5 INJECTION, SOLUTION INTRAMUSCULAR; INTRAVENOUS; SUBCUTANEOUS EVERY 5 MIN PRN
Status: DISCONTINUED | OUTPATIENT
Start: 2025-01-07 | End: 2025-01-07 | Stop reason: HOSPADM

## 2025-01-07 RX ORDER — SODIUM CHLORIDE 9 MG/ML
INJECTION, SOLUTION INTRAVENOUS PRN
Status: DISCONTINUED | OUTPATIENT
Start: 2025-01-07 | End: 2025-01-08 | Stop reason: HOSPADM

## 2025-01-07 RX ORDER — FENTANYL CITRATE 50 UG/ML
100 INJECTION, SOLUTION INTRAMUSCULAR; INTRAVENOUS
Status: DISCONTINUED | OUTPATIENT
Start: 2025-01-07 | End: 2025-01-07 | Stop reason: HOSPADM

## 2025-01-07 RX ORDER — SODIUM CHLORIDE, SODIUM LACTATE, POTASSIUM CHLORIDE, CALCIUM CHLORIDE 600; 310; 30; 20 MG/100ML; MG/100ML; MG/100ML; MG/100ML
INJECTION, SOLUTION INTRAVENOUS CONTINUOUS
Status: DISCONTINUED | OUTPATIENT
Start: 2025-01-07 | End: 2025-01-07 | Stop reason: HOSPADM

## 2025-01-07 RX ORDER — ROCURONIUM BROMIDE 10 MG/ML
INJECTION, SOLUTION INTRAVENOUS
Status: DISCONTINUED | OUTPATIENT
Start: 2025-01-07 | End: 2025-01-07 | Stop reason: SDUPTHER

## 2025-01-07 RX ORDER — DOCUSATE SODIUM 100 MG/1
100 CAPSULE, LIQUID FILLED ORAL 2 TIMES DAILY
Status: DISCONTINUED | OUTPATIENT
Start: 2025-01-07 | End: 2025-01-08 | Stop reason: HOSPADM

## 2025-01-07 RX ORDER — KETOROLAC TROMETHAMINE 30 MG/ML
30 INJECTION, SOLUTION INTRAMUSCULAR; INTRAVENOUS EVERY 6 HOURS
Status: DISCONTINUED | OUTPATIENT
Start: 2025-01-07 | End: 2025-01-08

## 2025-01-07 RX ORDER — ONDANSETRON 2 MG/ML
4 INJECTION INTRAMUSCULAR; INTRAVENOUS EVERY 6 HOURS PRN
Status: DISCONTINUED | OUTPATIENT
Start: 2025-01-07 | End: 2025-01-08 | Stop reason: HOSPADM

## 2025-01-07 RX ORDER — MIDAZOLAM HYDROCHLORIDE 1 MG/ML
INJECTION, SOLUTION INTRAMUSCULAR; INTRAVENOUS
Status: DISCONTINUED | OUTPATIENT
Start: 2025-01-07 | End: 2025-01-07 | Stop reason: SDUPTHER

## 2025-01-07 RX ORDER — FENTANYL CITRATE 50 UG/ML
25 INJECTION, SOLUTION INTRAMUSCULAR; INTRAVENOUS EVERY 5 MIN PRN
Status: DISCONTINUED | OUTPATIENT
Start: 2025-01-07 | End: 2025-01-07 | Stop reason: HOSPADM

## 2025-01-07 RX ORDER — ACETAMINOPHEN 500 MG
1000 TABLET ORAL ONCE
Status: COMPLETED | OUTPATIENT
Start: 2025-01-07 | End: 2025-01-07

## 2025-01-07 RX ORDER — HYDRALAZINE HYDROCHLORIDE 20 MG/ML
10 INJECTION INTRAMUSCULAR; INTRAVENOUS ONCE
Status: DISCONTINUED | OUTPATIENT
Start: 2025-01-07 | End: 2025-01-07 | Stop reason: HOSPADM

## 2025-01-07 RX ORDER — ONDANSETRON 4 MG/1
4 TABLET, ORALLY DISINTEGRATING ORAL EVERY 8 HOURS PRN
Status: DISCONTINUED | OUTPATIENT
Start: 2025-01-07 | End: 2025-01-08 | Stop reason: HOSPADM

## 2025-01-07 RX ORDER — BUPIVACAINE HYDROCHLORIDE 5 MG/ML
INJECTION, SOLUTION EPIDURAL; INTRACAUDAL PRN
Status: DISCONTINUED | OUTPATIENT
Start: 2025-01-07 | End: 2025-01-07 | Stop reason: HOSPADM

## 2025-01-07 RX ORDER — DEXAMETHASONE SODIUM PHOSPHATE 4 MG/ML
INJECTION, SOLUTION INTRA-ARTICULAR; INTRALESIONAL; INTRAMUSCULAR; INTRAVENOUS; SOFT TISSUE
Status: DISCONTINUED | OUTPATIENT
Start: 2025-01-07 | End: 2025-01-07 | Stop reason: SDUPTHER

## 2025-01-07 RX ORDER — PROCHLORPERAZINE EDISYLATE 5 MG/ML
10 INJECTION INTRAMUSCULAR; INTRAVENOUS EVERY 6 HOURS PRN
Status: DISCONTINUED | OUTPATIENT
Start: 2025-01-07 | End: 2025-01-08 | Stop reason: HOSPADM

## 2025-01-07 RX ORDER — ONDANSETRON 2 MG/ML
4 INJECTION INTRAMUSCULAR; INTRAVENOUS
Status: DISCONTINUED | OUTPATIENT
Start: 2025-01-07 | End: 2025-01-07 | Stop reason: HOSPADM

## 2025-01-07 RX ORDER — MIDAZOLAM HYDROCHLORIDE 2 MG/2ML
2 INJECTION, SOLUTION INTRAMUSCULAR; INTRAVENOUS PRN
Status: DISCONTINUED | OUTPATIENT
Start: 2025-01-07 | End: 2025-01-07 | Stop reason: HOSPADM

## 2025-01-07 RX ORDER — NALOXONE HYDROCHLORIDE 0.4 MG/ML
INJECTION, SOLUTION INTRAMUSCULAR; INTRAVENOUS; SUBCUTANEOUS PRN
Status: DISCONTINUED | OUTPATIENT
Start: 2025-01-07 | End: 2025-01-07 | Stop reason: HOSPADM

## 2025-01-07 RX ORDER — SODIUM CHLORIDE 0.9 % (FLUSH) 0.9 %
5-40 SYRINGE (ML) INJECTION EVERY 12 HOURS SCHEDULED
Status: DISCONTINUED | OUTPATIENT
Start: 2025-01-07 | End: 2025-01-07 | Stop reason: HOSPADM

## 2025-01-07 RX ORDER — FENTANYL CITRATE 50 UG/ML
INJECTION, SOLUTION INTRAMUSCULAR; INTRAVENOUS
Status: DISCONTINUED | OUTPATIENT
Start: 2025-01-07 | End: 2025-01-07 | Stop reason: SDUPTHER

## 2025-01-07 RX ORDER — PROPOFOL 10 MG/ML
INJECTION, EMULSION INTRAVENOUS
Status: DISCONTINUED | OUTPATIENT
Start: 2025-01-07 | End: 2025-01-07 | Stop reason: SDUPTHER

## 2025-01-07 RX ORDER — ENOXAPARIN SODIUM 100 MG/ML
40 INJECTION SUBCUTANEOUS ONCE
Status: COMPLETED | OUTPATIENT
Start: 2025-01-07 | End: 2025-01-07

## 2025-01-07 RX ORDER — IBUPROFEN 400 MG/1
800 TABLET, FILM COATED ORAL EVERY 8 HOURS
Status: DISCONTINUED | OUTPATIENT
Start: 2025-01-08 | End: 2025-01-08

## 2025-01-07 RX ORDER — SCOPOLAMINE 1 MG/3D
PATCH, EXTENDED RELEASE TRANSDERMAL
Status: DISCONTINUED | OUTPATIENT
Start: 2025-01-07 | End: 2025-01-07 | Stop reason: SDUPTHER

## 2025-01-07 RX ORDER — FAMOTIDINE 20 MG/1
20 TABLET, FILM COATED ORAL 2 TIMES DAILY
Status: DISCONTINUED | OUTPATIENT
Start: 2025-01-07 | End: 2025-01-08 | Stop reason: HOSPADM

## 2025-01-07 RX ORDER — MORPHINE SULFATE 4 MG/ML
4 INJECTION, SOLUTION INTRAMUSCULAR; INTRAVENOUS
Status: DISCONTINUED | OUTPATIENT
Start: 2025-01-07 | End: 2025-01-08 | Stop reason: HOSPADM

## 2025-01-07 RX ORDER — PROCHLORPERAZINE EDISYLATE 5 MG/ML
5 INJECTION INTRAMUSCULAR; INTRAVENOUS
Status: DISCONTINUED | OUTPATIENT
Start: 2025-01-07 | End: 2025-01-07 | Stop reason: HOSPADM

## 2025-01-07 RX ORDER — LIDOCAINE HYDROCHLORIDE 20 MG/ML
INJECTION, SOLUTION EPIDURAL; INFILTRATION; INTRACAUDAL; PERINEURAL
Status: DISCONTINUED | OUTPATIENT
Start: 2025-01-07 | End: 2025-01-07 | Stop reason: SDUPTHER

## 2025-01-07 RX ORDER — SODIUM CHLORIDE 0.9 % (FLUSH) 0.9 %
5-40 SYRINGE (ML) INJECTION PRN
Status: DISCONTINUED | OUTPATIENT
Start: 2025-01-07 | End: 2025-01-07 | Stop reason: HOSPADM

## 2025-01-07 RX ORDER — OXYCODONE HYDROCHLORIDE 5 MG/1
5 TABLET ORAL EVERY 4 HOURS PRN
Status: DISCONTINUED | OUTPATIENT
Start: 2025-01-07 | End: 2025-01-08 | Stop reason: HOSPADM

## 2025-01-07 RX ORDER — MORPHINE SULFATE 2 MG/ML
2 INJECTION, SOLUTION INTRAMUSCULAR; INTRAVENOUS
Status: DISCONTINUED | OUTPATIENT
Start: 2025-01-07 | End: 2025-01-08 | Stop reason: HOSPADM

## 2025-01-07 RX ORDER — SODIUM CHLORIDE 0.9 % (FLUSH) 0.9 %
5-40 SYRINGE (ML) INJECTION PRN
Status: DISCONTINUED | OUTPATIENT
Start: 2025-01-07 | End: 2025-01-08 | Stop reason: HOSPADM

## 2025-01-07 RX ORDER — OXYCODONE AND ACETAMINOPHEN 5; 325 MG/1; MG/1
1 TABLET ORAL EVERY 6 HOURS PRN
Qty: 20 TABLET | Refills: 0 | Status: SHIPPED | OUTPATIENT
Start: 2025-01-07 | End: 2025-01-12

## 2025-01-07 RX ORDER — OXYCODONE HYDROCHLORIDE 5 MG/1
10 TABLET ORAL EVERY 4 HOURS PRN
Status: DISCONTINUED | OUTPATIENT
Start: 2025-01-07 | End: 2025-01-08 | Stop reason: HOSPADM

## 2025-01-07 RX ORDER — ACETAMINOPHEN 500 MG
1000 TABLET ORAL EVERY 8 HOURS SCHEDULED
Status: DISCONTINUED | OUTPATIENT
Start: 2025-01-07 | End: 2025-01-08 | Stop reason: HOSPADM

## 2025-01-07 RX ORDER — IBUPROFEN 800 MG/1
800 TABLET, FILM COATED ORAL 3 TIMES DAILY PRN
Qty: 90 TABLET | Refills: 0 | Status: SHIPPED | OUTPATIENT
Start: 2025-01-07

## 2025-01-07 RX ORDER — SODIUM CHLORIDE 0.9 % (FLUSH) 0.9 %
5-40 SYRINGE (ML) INJECTION EVERY 12 HOURS SCHEDULED
Status: DISCONTINUED | OUTPATIENT
Start: 2025-01-07 | End: 2025-01-08 | Stop reason: HOSPADM

## 2025-01-07 RX ADMIN — DOCUSATE SODIUM 100 MG: 100 CAPSULE, LIQUID FILLED ORAL at 16:01

## 2025-01-07 RX ADMIN — WATER 2000 MG: 1 INJECTION INTRAMUSCULAR; INTRAVENOUS; SUBCUTANEOUS at 07:55

## 2025-01-07 RX ADMIN — FENTANYL CITRATE 100 MCG: 50 INJECTION INTRAMUSCULAR; INTRAVENOUS at 07:49

## 2025-01-07 RX ADMIN — Medication 160 MG: at 07:49

## 2025-01-07 RX ADMIN — DOCUSATE SODIUM 100 MG: 100 CAPSULE, LIQUID FILLED ORAL at 20:13

## 2025-01-07 RX ADMIN — KETOROLAC TROMETHAMINE 30 MG: 30 INJECTION, SOLUTION INTRAMUSCULAR at 09:55

## 2025-01-07 RX ADMIN — MIDAZOLAM HYDROCHLORIDE 3 MG: 1 INJECTION, SOLUTION INTRAMUSCULAR; INTRAVENOUS at 07:38

## 2025-01-07 RX ADMIN — KETOROLAC TROMETHAMINE 30 MG: 30 INJECTION, SOLUTION INTRAMUSCULAR at 20:13

## 2025-01-07 RX ADMIN — OXYCODONE 5 MG: 5 TABLET ORAL at 19:21

## 2025-01-07 RX ADMIN — Medication 20 MG: at 08:37

## 2025-01-07 RX ADMIN — SCOPALAMINE 1 PATCH: 1 PATCH, EXTENDED RELEASE TRANSDERMAL at 10:09

## 2025-01-07 RX ADMIN — DEXAMETHASONE SODIUM PHOSPHATE 4 MG: 4 INJECTION INTRA-ARTICULAR; INTRALESIONAL; INTRAMUSCULAR; INTRAVENOUS; SOFT TISSUE at 07:53

## 2025-01-07 RX ADMIN — PROPOFOL 150 MG: 10 INJECTION, EMULSION INTRAVENOUS at 07:49

## 2025-01-07 RX ADMIN — ROCURONIUM BROMIDE 20 MG: 10 INJECTION, SOLUTION INTRAVENOUS at 08:37

## 2025-01-07 RX ADMIN — ROCURONIUM BROMIDE 10 MG: 10 INJECTION, SOLUTION INTRAVENOUS at 07:49

## 2025-01-07 RX ADMIN — Medication 30 MG: at 08:17

## 2025-01-07 RX ADMIN — FENTANYL CITRATE 25 MCG: 50 INJECTION INTRAMUSCULAR; INTRAVENOUS at 11:13

## 2025-01-07 RX ADMIN — PROPOFOL 25 MG: 10 INJECTION, EMULSION INTRAVENOUS at 09:08

## 2025-01-07 RX ADMIN — ONDANSETRON 4 MG: 2 INJECTION INTRAMUSCULAR; INTRAVENOUS at 09:45

## 2025-01-07 RX ADMIN — LIDOCAINE HYDROCHLORIDE 80 MG: 20 INJECTION, SOLUTION EPIDURAL; INFILTRATION; INTRACAUDAL; PERINEURAL at 07:49

## 2025-01-07 RX ADMIN — KETOROLAC TROMETHAMINE 30 MG: 30 INJECTION, SOLUTION INTRAMUSCULAR at 16:02

## 2025-01-07 RX ADMIN — MIDAZOLAM HYDROCHLORIDE 2 MG: 1 INJECTION, SOLUTION INTRAMUSCULAR; INTRAVENOUS at 07:42

## 2025-01-07 RX ADMIN — HYDROMORPHONE HYDROCHLORIDE 0.5 MG: 1 INJECTION, SOLUTION INTRAMUSCULAR; INTRAVENOUS; SUBCUTANEOUS at 10:03

## 2025-01-07 RX ADMIN — SODIUM CHLORIDE, POTASSIUM CHLORIDE, SODIUM LACTATE AND CALCIUM CHLORIDE: 600; 310; 30; 20 INJECTION, SOLUTION INTRAVENOUS at 07:40

## 2025-01-07 RX ADMIN — METRONIDAZOLE 500 MG: 5 INJECTION, SOLUTION INTRAVENOUS at 07:55

## 2025-01-07 RX ADMIN — FAMOTIDINE 20 MG: 20 TABLET, FILM COATED ORAL at 16:02

## 2025-01-07 RX ADMIN — FAMOTIDINE 20 MG: 20 TABLET, FILM COATED ORAL at 20:13

## 2025-01-07 RX ADMIN — ROCURONIUM BROMIDE 40 MG: 10 INJECTION, SOLUTION INTRAVENOUS at 07:53

## 2025-01-07 RX ADMIN — ENOXAPARIN SODIUM 40 MG: 100 INJECTION SUBCUTANEOUS at 06:25

## 2025-01-07 RX ADMIN — FENTANYL CITRATE 25 MCG: 50 INJECTION INTRAMUSCULAR; INTRAVENOUS at 11:18

## 2025-01-07 RX ADMIN — HYDROMORPHONE HYDROCHLORIDE 0.5 MG: 1 INJECTION, SOLUTION INTRAMUSCULAR; INTRAVENOUS; SUBCUTANEOUS at 09:51

## 2025-01-07 RX ADMIN — ONDANSETRON 4 MG: 2 INJECTION INTRAMUSCULAR; INTRAVENOUS at 07:53

## 2025-01-07 RX ADMIN — ACETAMINOPHEN 1000 MG: 500 TABLET ORAL at 16:01

## 2025-01-07 RX ADMIN — ACETAMINOPHEN 1000 MG: 500 TABLET ORAL at 06:25

## 2025-01-07 ASSESSMENT — PAIN DESCRIPTION - LOCATION
LOCATION: ABDOMEN

## 2025-01-07 ASSESSMENT — PAIN SCALES - GENERAL
PAINLEVEL_OUTOF10: 6
PAINLEVEL_OUTOF10: 7
PAINLEVEL_OUTOF10: 4
PAINLEVEL_OUTOF10: 6
PAINLEVEL_OUTOF10: 5
PAINLEVEL_OUTOF10: 7
PAINLEVEL_OUTOF10: 6

## 2025-01-07 ASSESSMENT — PAIN DESCRIPTION - DESCRIPTORS
DESCRIPTORS: ACHING
DESCRIPTORS: ACHING;DULL
DESCRIPTORS: ACHING
DESCRIPTORS: ACHING;CRAMPING;SORE
DESCRIPTORS: ACHING

## 2025-01-07 ASSESSMENT — PAIN - FUNCTIONAL ASSESSMENT: PAIN_FUNCTIONAL_ASSESSMENT: NONE - DENIES PAIN

## 2025-01-07 ASSESSMENT — PAIN DESCRIPTION - ORIENTATION
ORIENTATION: RIGHT
ORIENTATION: MID
ORIENTATION: ANTERIOR;MID

## 2025-01-07 NOTE — FLOWSHEET NOTE
01/07/25 0821   Family Communication    Relationship to Patient Spouse    Phone Number  Reinier Roque [phone]   Family/Significant Other Update Called;Updated   Delivery Origin Nurse   Message Disposition Family present - message delivered   Update Given Yes   Family Communication   Family Update Message Procedure started;Surgeon working;Patient stable     Procedural consent also verified with , surgeon, and two nurses via telephone.

## 2025-01-07 NOTE — ANESTHESIA POSTPROCEDURE EVALUATION
Post-Anesthesia Evaluation and Assessment    Patient: Sohail Gary MRN: [id number]  SSN: [id number]    YOB: 1985  Age: 39 y.o.  Sex: female      I have evaluated the patient and they are stable and ready for discharge from the PACU.     Cardiovascular Function/Vital Signs  Visit Vitals  /71   Pulse 57   Temp (!) 96.1 °F (35.6 °C) (Axillary)   Resp 11   Ht 1.702 m (5' 7\")   Wt 64 kg (141 lb)   SpO2 100%   BMI 22.08 kg/m²       Patient is status post General anesthesia for Procedure(s) with comments:  ROBOTIC TOTAL LAPAROSCOPIC HYSTERECTOMY, BILATERAL SALPINGECTOMY, LEFT OOPHORECTOMY, CYSTOSCOPY - abdomen class I clean, perineum class II clean contaminated.    Nausea/Vomiting: None    Postoperative hydration reviewed and adequate.    Pain:  Managed    Neurological Status:   At baseline    Mental Status, Level of Consciousness: Alert and  oriented to person, place, and time    Pulmonary Status:   Adequate oxygenation and airway patent    Complications related to anesthesia: None    Post-anesthesia assessment completed. No concerns    Signed By: Jeffrey Lassiter MD     January 7, 2025

## 2025-01-07 NOTE — PERIOP NOTE
TRANSFER - OUT REPORT:    Verbal report given to Ting (name) on Sohail Gary  being transferred to [facility] (unit) for routine post-op       Report consisted of patient’s Situation, Background, Assessment and   Recommendations(SBAR).     Time Pre op antibiotic given:0755  Anesthesia Stop time: 0755    Information from the following report(s) SBAR, OR Summary, Procedure Summary, Intake/Output, MAR, and Cardiac Rhythm SR  was reviewed with the receiving nurse.    Opportunity for questions and clarification was provided.     Is the patient on 02? No       L/Min RA       Other N/A    Is the patient on a monitor? No    Is the nurse transporting with the patient? No    At transfer, are there Patient Belongings with the patient?  If Yes, please note/list:    Surgical Waiting Area notified of patient's transfer from PACU? Yes

## 2025-01-07 NOTE — ANESTHESIA PRE PROCEDURE
solid consumption: 1300                        Date of last liquid consumption: 01/06/25                        Date of last solid food consumption: 01/06/25    BMI:   Wt Readings from Last 3 Encounters:   01/07/25 64 kg (141 lb)   11/22/24 64.7 kg (142 lb 10.2 oz)   09/21/24 61.2 kg (135 lb)     Body mass index is 22.08 kg/m².    CBC:   Lab Results   Component Value Date/Time    WBC 7.7 09/21/2024 05:16 PM    RBC 3.33 09/21/2024 05:16 PM    HGB 9.3 09/21/2024 05:16 PM    HCT 30.2 09/21/2024 05:16 PM    MCV 90.7 09/21/2024 05:16 PM    RDW 15.2 09/21/2024 05:16 PM     09/21/2024 05:16 PM       CMP:   Lab Results   Component Value Date/Time     09/21/2024 05:16 PM    K 3.5 09/21/2024 05:16 PM     09/21/2024 05:16 PM    CO2 27 09/21/2024 05:16 PM    BUN 12 09/21/2024 05:16 PM    CREATININE 0.70 09/21/2024 05:16 PM    GFRAA >60 08/27/2020 08:40 PM    AGRATIO 1.0 08/27/2020 08:40 PM    LABGLOM >90 09/21/2024 05:16 PM    GLUCOSE 136 09/21/2024 05:16 PM    CALCIUM 8.9 09/21/2024 05:16 PM    BILITOT 0.3 09/21/2024 05:16 PM    ALKPHOS 81 09/21/2024 05:16 PM    ALKPHOS 55 08/27/2020 08:40 PM    AST 12 09/21/2024 05:16 PM    ALT 16 09/21/2024 05:16 PM       POC Tests: No results for input(s): \"POCGLU\", \"POCNA\", \"POCK\", \"POCCL\", \"POCBUN\", \"POCHEMO\", \"POCHCT\" in the last 72 hours.    Coags: No results found for: \"PROTIME\", \"INR\", \"APTT\"    HCG (If Applicable):   Lab Results   Component Value Date    PREGTESTUR Negative 11/22/2024    HCGQUANT [id number] (H) 02/19/2015        ABGs: No results found for: \"PHART\", \"PO2ART\", \"OGV7QRG\", \"WTY2TPW\", \"BEART\", \"U6HXUGZW\"     Type & Screen (If Applicable):  Lab Results   Component Value Date    ABORH O POSITIVE 02/19/2015       Drug/Infectious Status (If Applicable):  No results found for: \"HIV\", \"HEPCAB\"    COVID-19 Screening (If Applicable):   Lab Results   Component Value Date/Time    COVID19 Not detected 08/12/2024 08:50 AM           Anesthesia Evaluation  Patient

## 2025-01-07 NOTE — OP NOTE
from the vagina.  The left fallopian tube and ovary were then uplifted by the fimbriated end.  The IP ligament was then identified.  The fenestrated bipolar instrument was then used to clamp the IP ligament and cautery was then applied.  There was sharp dissection with the curved Hernandez scissors instrument with adequate hemostasis.  The left ovary and fallopian tube were then further removed from the mesosalpinx using cautery with the fenestrated bipolar instrument, sharp dissection with curved Hernandez scissors instrument until the left ovary and fallopian tube were freed from the pelvic sidewall.  Of note, during this procedure, the ureter was identified and was distal to the operative field. The left ovary and fallopian tube were then placed inside the patient's vagina and then removed.  The right fallopian tube was then uplifted and a combination of cautery with the fenestrated bipolar instrument and sharp dissection with a curved Hernandez scissors instrument was used to remove and dissect the right fallopian tube away from the pelvic sidewall.  Once the fallopian tube was removed, it was removed through the assist port.  The pelvis was then copiously irrigated and appeared to be hemostatic.  The vaginal cuff was then closed using a 2-0 Stratafix suture.  A large needle  was then switched out for the scissor starting at the left apex and working to the right apex.  The vaginal cuff was then closed using a 2-0 Stratafix suture in a running fashion.  A double-layer closure was performed and the suture was then cut.  The suture and needle were removed from the patient's body.  The pelvis was copiously irrigated again and appeared to be hemostatic. At the beginning of cuff closure, the patient had been given methylene blue.  Attention was then turned to cystoscopy.  The patient's Mast catheter was removed.  There was blue urine noted in the patient's catheter.  The cystoscope was then placed inside the urethra into the

## 2025-01-07 NOTE — H&P
BRIEF OPERATIVE NOTE    Date of Procedure: Jan 7, 2025   Preoperative Diagnosis: Menorrhagia with irregular cycle [N92.1]  Uterine leiomyoma, unspecified location [D25.9]  Postoperative Diagnosis: Same    Procedure: Procedure(s):  ROBOTIC TOTAL LAPAROSCOPIC HYSTERECTOMY, BILATERAL SALPINGO-OOPHORECTOMY, LEFT OOPHORECTOMY; CYSTOSCOPY  Surgeons and Role:     * Kellie Hernandez MD - Primary  Anesthesia: General   Estimated Blood Loss: 50  Specimens: uterus, cervix, bilateral fallopian tubes, left ovary  Findings: adhesive disease to the bladder, left ovary enlarged.   Complications: none  Implants: * No implants in log *  
to Admission medications    Medication Sig Start Date End Date Taking? Authorizing Provider   amoxicillin-clavulanate (AUGMENTIN) 875-125 MG per tablet Take 1 tablet by mouth 2 times daily for 10 days 1/2/25 1/12/25 Yes Chula Barone PA-C   montelukast (SINGULAIR) 10 MG tablet Take 1 tablet by mouth nightly   Yes Suleiman Diez MD   dilTIAZem (DILACOR XR) 180 MG extended release capsule Take 1 capsule by mouth daily   Yes Suleiman Diez MD   triamcinolone (KENALOG) 0.025 % ointment Apply topically 2 times daily Apply topically 2 times daily.  Patient taking differently: Apply topically as needed (exzema) Apply topically 2 times daily. 9/20/24  Yes Brandy Harvey APRN - NP   acetaminophen (TYLENOL) 500 MG tablet Take 2 tablets by mouth every 6 hours as needed for Pain 9/6/24  Yes Narcisa Jones PA-C   Magic Mouthwash (MIRACLE MOUTHWASH) Swish and spit 5 mLs 4 times daily as needed for Irritation or Pain  Patient not taking: Reported on 1/7/2025 1/2/25   Chula Barone PA-C   ibuprofen (ADVIL;MOTRIN) 800 MG tablet Take 1 tablet by mouth 3 times daily as needed for Pain 11/22/24   Kellie Hernandez MD   fluticasone (FLONASE) 50 MCG/ACT nasal spray 1 spray by Each Nostril route daily as needed for Rhinitis  Patient not taking: Reported on 1/7/2025    Suleiman Diez MD   ferrous sulfate (IRON 325) 325 (65 Fe) MG tablet Take 1 tablet by mouth 2 times daily  Patient not taking: Reported on 1/2/2025 9/21/24   Olivia Iglesias APRN - NP        Review of Systems:  A comprehensive review of systems was negative except for that written in the History of Present Illness.     Objective:     Vitals:    12/27/24 1542 01/07/25 0607   BP:  104/72   Pulse:  69   Resp:  18   Temp:  98.1 °F (36.7 °C)   TempSrc:  Oral   SpO2:  100%   Weight: 63.5 kg (140 lb) 64 kg (141 lb)   Height: 1.702 m (5' 7\") 1.702 m (5' 7\")       Physical Exam:  Patient without distress    Assessment:     Active Problems:    * No

## 2025-01-08 VITALS
TEMPERATURE: 98.8 F | SYSTOLIC BLOOD PRESSURE: 95 MMHG | DIASTOLIC BLOOD PRESSURE: 54 MMHG | OXYGEN SATURATION: 100 % | BODY MASS INDEX: 22.13 KG/M2 | WEIGHT: 141 LBS | HEART RATE: 76 BPM | HEIGHT: 67 IN | RESPIRATION RATE: 16 BRPM

## 2025-01-08 LAB
ERYTHROCYTE [DISTWIDTH] IN BLOOD BY AUTOMATED COUNT: 12.6 % (ref 11.5–14.5)
HCT VFR BLD AUTO: 31.3 % (ref 35–47)
HGB BLD-MCNC: 10.2 G/DL (ref 11.5–16)
MCH RBC QN AUTO: 30.8 PG (ref 26–34)
MCHC RBC AUTO-ENTMCNC: 32.6 G/DL (ref 30–36.5)
MCV RBC AUTO: 94.6 FL (ref 80–99)
NRBC # BLD: 0 K/UL (ref 0–0.01)
NRBC BLD-RTO: 0 PER 100 WBC
PLATELET # BLD AUTO: 356 K/UL (ref 150–400)
PMV BLD AUTO: 9.7 FL (ref 8.9–12.9)
RBC # BLD AUTO: 3.31 M/UL (ref 3.8–5.2)
WBC # BLD AUTO: 18.7 K/UL (ref 3.6–11)

## 2025-01-08 PROCEDURE — 6370000000 HC RX 637 (ALT 250 FOR IP): Performed by: OBSTETRICS & GYNECOLOGY

## 2025-01-08 PROCEDURE — 85027 COMPLETE CBC AUTOMATED: CPT

## 2025-01-08 PROCEDURE — 36415 COLL VENOUS BLD VENIPUNCTURE: CPT

## 2025-01-08 PROCEDURE — 2500000003 HC RX 250 WO HCPCS: Performed by: OBSTETRICS & GYNECOLOGY

## 2025-01-08 PROCEDURE — G0378 HOSPITAL OBSERVATION PER HR: HCPCS

## 2025-01-08 RX ORDER — IBUPROFEN 400 MG/1
800 TABLET, FILM COATED ORAL EVERY 8 HOURS PRN
Status: DISCONTINUED | OUTPATIENT
Start: 2025-01-08 | End: 2025-01-08 | Stop reason: HOSPADM

## 2025-01-08 RX ORDER — PSEUDOEPHEDRINE HCL 30 MG
100 TABLET ORAL 2 TIMES DAILY
Qty: 60 CAPSULE | Refills: 1 | Status: SHIPPED | OUTPATIENT
Start: 2025-01-08

## 2025-01-08 RX ADMIN — DOCUSATE SODIUM 100 MG: 100 CAPSULE, LIQUID FILLED ORAL at 08:15

## 2025-01-08 RX ADMIN — IBUPROFEN 800 MG: 400 TABLET, FILM COATED ORAL at 04:30

## 2025-01-08 RX ADMIN — IBUPROFEN 800 MG: 400 TABLET, FILM COATED ORAL at 14:22

## 2025-01-08 RX ADMIN — OXYCODONE 5 MG: 5 TABLET ORAL at 00:25

## 2025-01-08 RX ADMIN — ACETAMINOPHEN 1000 MG: 500 TABLET ORAL at 08:14

## 2025-01-08 RX ADMIN — FAMOTIDINE 20 MG: 20 TABLET, FILM COATED ORAL at 08:14

## 2025-01-08 RX ADMIN — ACETAMINOPHEN 1000 MG: 500 TABLET ORAL at 00:25

## 2025-01-08 RX ADMIN — SODIUM CHLORIDE, PRESERVATIVE FREE 10 ML: 5 INJECTION INTRAVENOUS at 08:15

## 2025-01-08 ASSESSMENT — PAIN SCALES - GENERAL
PAINLEVEL_OUTOF10: 6
PAINLEVEL_OUTOF10: 3

## 2025-01-08 ASSESSMENT — PAIN DESCRIPTION - ORIENTATION
ORIENTATION: MID
ORIENTATION: ANTERIOR;LOWER

## 2025-01-08 ASSESSMENT — PAIN DESCRIPTION - LOCATION
LOCATION: ABDOMEN
LOCATION: ABDOMEN

## 2025-01-08 ASSESSMENT — PAIN DESCRIPTION - DESCRIPTORS
DESCRIPTORS: ACHING;SORE
DESCRIPTORS: ACHING

## 2025-01-08 NOTE — PROGRESS NOTES
1930 - Bedside and Verbal shift change report given to SAI Cerda RN (oncoming nurse) by AYLA Yarbrough RN (offgoing nurse). Report included the following information Nurse Handoff Report, Index, Adult Overview, Surgery Report, Intake/Output, MAR, Recent Results, and Med Rec Status.

## 2025-01-08 NOTE — PROGRESS NOTES
1216: I have reviewed discharge instruction and reviewed medication times. Patient given copy of discharge instructions. Patient verbalizes understanding and denies any further questions at this time.

## 2025-01-08 NOTE — DISCHARGE SUMMARY
Discharge Summary     Name: Sohail Gary MRN: [id number]  SSN: [id number]    YOB: 1985  Age: 39 y.o.  Sex: female      Allergies: Adhesive tape and Cabbage    Admit Date: 1/7/2025    Discharge Date: 1/8/2025      Admitting Physician: Kellie Hernandez MD     * Admission Diagnoses: Fibroids    * Discharge Diagnoses:      * Procedures: Total Laparoscopic Hysterectomy with Bilateral Salpingectomy left oophorectomy    * Discharge Condition: Stable    * Hospital Course: Normal hospital course for this procedure.    Significant Diagnostic Studies:   Recent Results (from the past 24 hour(s))   CBC    Collection Time: 01/08/25  4:45 AM   Result Value Ref Range    WBC 18.7 (H) 3.6 - 11.0 K/uL    RBC 3.31 (L) 3.80 - 5.20 M/uL    Hemoglobin 10.2 (L) 11.5 - 16.0 g/dL    Hematocrit 31.3 (L) 35.0 - 47.0 %    MCV 94.6 80.0 - 99.0 FL    MCH 30.8 26.0 - 34.0 PG    MCHC 32.6 30.0 - 36.5 g/dL    RDW 12.6 11.5 - 14.5 %    Platelets 356 150 - 400 K/uL    MPV 9.7 8.9 - 12.9 FL    Nucleated RBCs 0.0 0  WBC    nRBC 0.00 0.00 - 0.01 K/uL       * Disposition: Home    Discharge Medications:      Medication List        START taking these medications      docusate 100 MG Caps  Commonly known as: COLACE, DULCOLAX  Take 100 mg by mouth 2 times daily     ferrous sulfate 325 (65 Fe) MG tablet  Commonly known as: IRON 325  Take 1 tablet by mouth 2 times daily     oxyCODONE-acetaminophen 5-325 MG per tablet  Commonly known as: Percocet  Take 1 tablet by mouth every 6 hours as needed for Pain for up to 5 days. Intended supply: 5 days. Take lowest dose possible to manage pain Max Daily Amount: 4 tablets            CHANGE how you take these medications      * ibuprofen 800 MG tablet  Commonly known as: ADVIL;MOTRIN  Take 1 tablet by mouth 3 times daily as needed for Pain  What changed: Another medication with the same name was added. Make sure you understand how and when to take each.     * ibuprofen 800 MG tablet  Commonly

## 2025-01-08 NOTE — PROGRESS NOTES
Sohail Gary     Patient doing well without significant complaint. Nausea and vomiting resolved, tolerating liquids, + flatus, +bm, malik removed.    Vitals:    Vitals:    25 0803   BP: (!) 95/54   Pulse: 76   Resp: 16   Temp: 98.8 °F (37.1 °C)   SpO2: 100%     Temp (24hrs), Av °F (36.7 °C), Min:96.1 °F (35.6 °C), Max:98.8 °F (37.1 °C)         Intake and Output:   Current shift: No intake/output data recorded.  Last 3 completed shifts: 1901 -  07  In: 1000 [I.V.:1000]  Out: 850 [Urine:800]        Exam:        Patient without distress.      Lungs clear.  Abdomen, bowel sounds present, soft, expected tenderness,  Lap incisions w/ dermabond, intact               Lower extremities are negative for swelling, cords or tenderness.    Labs:   Lab Results   Component Value Date/Time    WBC 18.7 2025 04:45 AM    WBC 7.7 2024 05:16 PM    WBC 17.1 2024 03:13 PM    WBC 8.7 2024 11:01 PM    WBC 12.0 2024 01:09 AM    WBC 6.3 2024 05:20 PM    WBC 5.3 2024 08:50 AM    WBC 3.9 2024 01:51 AM    WBC 3.8 2024 08:19 AM    WBC 7.5 2024 11:55 AM    WBC 9.6 2020 08:40 PM    WBC 11.4 2019 03:19 PM    HGB 10.2 2025 04:45 AM    HGB 9.3 2024 05:16 PM    HGB 11.5 2024 03:13 PM    HGB 9.3 2024 11:01 PM    HGB 9.5 2024 01:09 AM    HGB 10.5 2024 05:20 PM    HGB 9.6 2024 08:50 AM    HGB 10.0 2024 01:51 AM    HGB 9.7 2024 08:19 AM    HGB 9.1 2024 11:55 AM    HGB 11.4 2020 08:40 PM    HGB 12.2 2019 03:19 PM    HCT 31.3 2025 04:45 AM    HCT 30.2 2024 05:16 PM    HCT 35.1 2024 03:13 PM    HCT 29.4 2024 11:01 PM    HCT 30.7 2024 01:09 AM    HCT 32.3 2024 05:20 PM    HCT 30.6 2024 08:50 AM    HCT 32.4 2024 01:51 AM    HCT 31.7 2024 08:19 AM    HCT 30.8 2024 11:55 AM    HCT 34.4 2020 08:40 PM    HCT 37.3 2019

## 2025-01-09 RX ORDER — LIDOCAINE HYDROCHLORIDE 20 MG/ML
SOLUTION OROPHARYNGEAL
Refills: 0 | OUTPATIENT
Start: 2025-01-09

## 2025-01-24 ENCOUNTER — OFFICE VISIT (OUTPATIENT)
Age: 40
End: 2025-01-24

## 2025-01-24 VITALS
RESPIRATION RATE: 16 BRPM | TEMPERATURE: 97.7 F | WEIGHT: 137 LBS | SYSTOLIC BLOOD PRESSURE: 113 MMHG | DIASTOLIC BLOOD PRESSURE: 78 MMHG | HEART RATE: 72 BPM | BODY MASS INDEX: 21.46 KG/M2 | OXYGEN SATURATION: 98 %

## 2025-01-24 DIAGNOSIS — R19.09 ABDOMINAL MASS OF OTHER SITE: Primary | ICD-10-CM

## 2025-01-24 NOTE — PATIENT INSTRUCTIONS
Follow back up with Surgeon office for recommendations and scheduling US.  Keep area clean with warm soapy water and pat dry well.  Continue Keflex we directed.  Go to ER if fever > 100.4, severe pain, significant swelling or redness or any new concerning symptoms.

## 2025-01-24 NOTE — PROGRESS NOTES
soft.          Comments: Bulge just above umbilicus that is tender, no fluctuance noted and area is dry, there is very mild erythema to superior aspect of bulging. No inappropriate warmth to area or surrounding tenderness.    Skin:     General: Skin is warm and dry.      Capillary Refill: Capillary refill takes less than 2 seconds.   Neurological:      Mental Status: She is alert.           Procedures:   Procedures      DIAGNOSTICS:     All lab values have been personally reviewed by myself:   No results found for this visit on 01/24/25.     ASSESSMENT/PLAN:    Clinical Impression:   Assessment & Plan  Abdominal mass of other site                 MDM: Patient presents with post-surgical complication with concern for hernia above umbilicus.  She has followed with surgeon who has ordered US but not scheduled it.  She is on antibiotic and is afebrile without other complaints currently.  She is well appearing without red flag findings. We do not have labs or US and recommend calling surgeon office for further recommendation with worsening of symptoms.  ER precautions were given.  Patient verbalized understanding and agreement of precautions and care plan.         An electronic signature was used to authenticate this note.    Chula Barone PA-C

## 2025-02-21 ENCOUNTER — HOSPITAL ENCOUNTER (OUTPATIENT)
Facility: HOSPITAL | Age: 40
Discharge: HOME | End: 2025-02-24
Attending: OBSTETRICS & GYNECOLOGY
Payer: MEDICAID

## 2025-02-21 DIAGNOSIS — K43.9 HERNIA OF ABDOMINAL WALL: ICD-10-CM

## 2025-02-21 PROCEDURE — 76705 ECHO EXAM OF ABDOMEN: CPT

## 2025-03-03 ENCOUNTER — TRANSCRIBE ORDERS (OUTPATIENT)
Facility: HOSPITAL | Age: 40
End: 2025-03-03

## 2025-03-03 DIAGNOSIS — F10.10 ALCOHOL ABUSE: Primary | ICD-10-CM

## 2025-03-03 DIAGNOSIS — R14.0 BLOATING: ICD-10-CM

## 2025-03-03 DIAGNOSIS — R09.A2 GLOBUS SENSATION: ICD-10-CM

## 2025-03-03 DIAGNOSIS — D64.9 ANEMIA, UNSPECIFIED TYPE: ICD-10-CM

## 2025-03-21 ENCOUNTER — HOSPITAL ENCOUNTER (OUTPATIENT)
Facility: HOSPITAL | Age: 40
Discharge: HOME | End: 2025-03-24
Payer: MEDICAID

## 2025-03-21 DIAGNOSIS — R14.0 BLOATING: ICD-10-CM

## 2025-03-21 DIAGNOSIS — R09.A2 GLOBUS SENSATION: ICD-10-CM

## 2025-03-21 DIAGNOSIS — F10.10 ALCOHOL ABUSE: ICD-10-CM

## 2025-03-21 DIAGNOSIS — D64.9 ANEMIA, UNSPECIFIED TYPE: ICD-10-CM

## 2025-03-21 PROCEDURE — 74170 CT ABD WO CNTRST FLWD CNTRST: CPT

## 2025-03-21 PROCEDURE — 6360000004 HC RX CONTRAST MEDICATION: Performed by: PHYSICIAN ASSISTANT

## 2025-03-21 RX ORDER — IOPAMIDOL 755 MG/ML
100 INJECTION, SOLUTION INTRAVASCULAR
Status: COMPLETED | OUTPATIENT
Start: 2025-03-21 | End: 2025-03-21

## 2025-03-21 RX ADMIN — IOPAMIDOL 100 ML: 755 INJECTION, SOLUTION INTRAVENOUS at 09:25

## 2025-04-22 ENCOUNTER — HOSPITAL ENCOUNTER (EMERGENCY)
Facility: HOSPITAL | Age: 40
Discharge: HOME | End: 2025-04-22
Payer: MEDICAID

## 2025-04-22 ENCOUNTER — APPOINTMENT (OUTPATIENT)
Facility: HOSPITAL | Age: 40
End: 2025-04-22
Payer: MEDICAID

## 2025-04-22 VITALS
DIASTOLIC BLOOD PRESSURE: 74 MMHG | BODY MASS INDEX: 21.27 KG/M2 | HEART RATE: 67 BPM | RESPIRATION RATE: 18 BRPM | TEMPERATURE: 98.2 F | SYSTOLIC BLOOD PRESSURE: 105 MMHG | OXYGEN SATURATION: 100 % | WEIGHT: 135.8 LBS

## 2025-04-22 DIAGNOSIS — S61.213A LACERATION OF LEFT MIDDLE FINGER WITHOUT FOREIGN BODY WITHOUT DAMAGE TO NAIL, INITIAL ENCOUNTER: Primary | ICD-10-CM

## 2025-04-22 PROCEDURE — 6370000000 HC RX 637 (ALT 250 FOR IP)

## 2025-04-22 PROCEDURE — 6360000002 HC RX W HCPCS

## 2025-04-22 PROCEDURE — 90714 TD VACC NO PRESV 7 YRS+ IM: CPT

## 2025-04-22 PROCEDURE — 64450 NJX AA&/STRD OTHER PN/BRANCH: CPT

## 2025-04-22 PROCEDURE — 90471 IMMUNIZATION ADMIN: CPT

## 2025-04-22 PROCEDURE — 73140 X-RAY EXAM OF FINGER(S): CPT

## 2025-04-22 PROCEDURE — 99284 EMERGENCY DEPT VISIT MOD MDM: CPT

## 2025-04-22 RX ORDER — IBUPROFEN 800 MG/1
800 TABLET, FILM COATED ORAL EVERY 8 HOURS PRN
Qty: 20 TABLET | Refills: 0 | Status: SHIPPED | OUTPATIENT
Start: 2025-04-22

## 2025-04-22 RX ORDER — ACETAMINOPHEN 500 MG
1000 TABLET ORAL
Status: COMPLETED | OUTPATIENT
Start: 2025-04-22 | End: 2025-04-22

## 2025-04-22 RX ORDER — BUPIVACAINE HYDROCHLORIDE 5 MG/ML
30 INJECTION, SOLUTION EPIDURAL; INTRACAUDAL; PERINEURAL
Status: COMPLETED | OUTPATIENT
Start: 2025-04-22 | End: 2025-04-22

## 2025-04-22 RX ORDER — ACETAMINOPHEN 500 MG
1000 TABLET ORAL EVERY 6 HOURS PRN
Qty: 20 TABLET | Refills: 0 | Status: SHIPPED | OUTPATIENT
Start: 2025-04-22

## 2025-04-22 RX ADMIN — BUPIVACAINE HYDROCHLORIDE 150 MG: 5 INJECTION, SOLUTION EPIDURAL; INTRACAUDAL; PERINEURAL at 11:29

## 2025-04-22 RX ADMIN — ACETAMINOPHEN 1000 MG: 500 TABLET ORAL at 10:45

## 2025-04-22 RX ADMIN — CLOSTRIDIUM TETANI TOXOID ANTIGEN (FORMALDEHYDE INACTIVATED) AND CORYNEBACTERIUM DIPHTHERIAE TOXOID ANTIGEN (FORMALDEHYDE INACTIVATED) 0.5 ML: 5; 2 INJECTION, SUSPENSION INTRAMUSCULAR at 10:47

## 2025-04-22 NOTE — ED PROVIDER NOTES
[facility] EMERGENCY DEPARTMENT  EMERGENCY DEPARTMENT ENCOUNTER       Pt Name: Sohail Gary  MRN: [id number]  Birthdate 1985  Date of evaluation: 2025  Provider: Hanane Patel PA-C   PCP: Kurt Victoria MD  Note Started: 12:07 PM EDT 25     CHIEF COMPLAINT       Chief Complaint   Patient presents with    Laceration        HISTORY OF PRESENT ILLNESS: 1 or more elements      History From: Patient  HPI Limitations: None     Sohail Gary is a 39 y.o. female who presents complaining of laceration to left middle finger that occurred earlier today.  Patient reports that she was using an electric chainsaw when she accidentally cut her left middle finger.  Patient reports her last tetanus vaccine was in .  Patient denies extremity numbness, extremity denies, extremity tingling.     Nursing Notes were all reviewed and agreed with or any disagreements were addressed in the HPI.     REVIEW OF SYSTEMS      Review of Systems     Positives and Pertinent negatives as per HPI.    PAST HISTORY     Past Medical History:  Past Medical History:   Diagnosis Date    Arthritis     knees    Bloating     Other ill-defined conditions(799.89)     HSV    Palpitations     Tachycardia        Past Surgical History:  Past Surgical History:   Procedure Laterality Date     SECTION      x 2    HYSTERECTOMY (CERVIX STATUS UNKNOWN) N/A 2025    ROBOTIC TOTAL LAPAROSCOPIC HYSTERECTOMY, BILATERAL SALPINGECTOMY, LEFT OOPHORECTOMY, CYSTOSCOPY performed by Kellie Hernandez MD at [facility] MAIN OR    HYSTEROSCOPY N/A 2024    HYSTEROSCOPY, DILATATION AND CURETTAGE performed by Kellie Hernandez MD at [facility] MAIN OR    TOOTH EXTRACTION  2025    wisdom tooth       Family History:  History reviewed. No pertinent family history.    Social History:  Social History     Tobacco Use    Smoking status: Former     Types: Cigars     Passive exposure: Never    Smokeless tobacco: Never    Tobacco comments:

## 2025-04-22 NOTE — ED NOTES
Discharge instructions were given to the patient by Marychuy Morillo RN.  The patient left the Emergency Department alert and oriented and in no acute distress with 2 prescription(s). The patient was encouraged to call or return to the ED for worsening issues or problems and was encouraged to schedule a follow up appointment for continuing care.  The patient verbalized understanding of discharge instructions and prescriptions; all questions were answered. The patient has no further concerns at this time.

## 2025-04-22 NOTE — ED TRIAGE NOTES
Pt arrives to ED complaining of laceration to her left third digit. She reports using an electric  ~ 30 PTA. Pt denies tetanus vaccine recently. She has full sensation in finger.    Bleeding is controlled in triage and band-aid with gauze provided.

## 2025-04-30 ENCOUNTER — HOSPITAL ENCOUNTER (OUTPATIENT)
Facility: HOSPITAL | Age: 40
Setting detail: OUTPATIENT SURGERY
Discharge: HOME | End: 2025-04-30
Attending: INTERNAL MEDICINE | Admitting: INTERNAL MEDICINE
Payer: MEDICAID

## 2025-04-30 ENCOUNTER — ANESTHESIA (OUTPATIENT)
Facility: HOSPITAL | Age: 40
End: 2025-04-30
Payer: MEDICAID

## 2025-04-30 ENCOUNTER — ANESTHESIA EVENT (OUTPATIENT)
Facility: HOSPITAL | Age: 40
End: 2025-04-30
Payer: MEDICAID

## 2025-04-30 VITALS
DIASTOLIC BLOOD PRESSURE: 86 MMHG | SYSTOLIC BLOOD PRESSURE: 114 MMHG | HEART RATE: 78 BPM | HEIGHT: 67 IN | BODY MASS INDEX: 21.66 KG/M2 | TEMPERATURE: 97.6 F | OXYGEN SATURATION: 100 % | WEIGHT: 138 LBS | RESPIRATION RATE: 10 BRPM

## 2025-04-30 PROCEDURE — 88305 TISSUE EXAM BY PATHOLOGIST: CPT

## 2025-04-30 PROCEDURE — 82657 ENZYME CELL ACTIVITY: CPT

## 2025-04-30 PROCEDURE — 3600007512: Performed by: INTERNAL MEDICINE

## 2025-04-30 PROCEDURE — 2709999900 HC NON-CHARGEABLE SUPPLY: Performed by: INTERNAL MEDICINE

## 2025-04-30 PROCEDURE — 7100000011 HC PHASE II RECOVERY - ADDTL 15 MIN: Performed by: INTERNAL MEDICINE

## 2025-04-30 PROCEDURE — 3700000001 HC ADD 15 MINUTES (ANESTHESIA): Performed by: INTERNAL MEDICINE

## 2025-04-30 PROCEDURE — 7100000010 HC PHASE II RECOVERY - FIRST 15 MIN: Performed by: INTERNAL MEDICINE

## 2025-04-30 PROCEDURE — 3600007502: Performed by: INTERNAL MEDICINE

## 2025-04-30 PROCEDURE — 6360000002 HC RX W HCPCS: Performed by: NURSE ANESTHETIST, CERTIFIED REGISTERED

## 2025-04-30 PROCEDURE — 3700000000 HC ANESTHESIA ATTENDED CARE: Performed by: INTERNAL MEDICINE

## 2025-04-30 PROCEDURE — 2720000010 HC SURG SUPPLY STERILE: Performed by: INTERNAL MEDICINE

## 2025-04-30 PROCEDURE — 2580000003 HC RX 258: Performed by: INTERNAL MEDICINE

## 2025-04-30 RX ORDER — SODIUM CHLORIDE 0.9 % (FLUSH) 0.9 %
5-40 SYRINGE (ML) INJECTION EVERY 12 HOURS SCHEDULED
Status: DISCONTINUED | OUTPATIENT
Start: 2025-04-30 | End: 2025-04-30 | Stop reason: HOSPADM

## 2025-04-30 RX ORDER — SODIUM CHLORIDE 0.9 % (FLUSH) 0.9 %
5-40 SYRINGE (ML) INJECTION PRN
Status: DISCONTINUED | OUTPATIENT
Start: 2025-04-30 | End: 2025-04-30 | Stop reason: HOSPADM

## 2025-04-30 RX ORDER — LIDOCAINE HYDROCHLORIDE 20 MG/ML
INJECTION, SOLUTION EPIDURAL; INFILTRATION; INTRACAUDAL; PERINEURAL
Status: DISCONTINUED | OUTPATIENT
Start: 2025-04-30 | End: 2025-04-30 | Stop reason: SDUPTHER

## 2025-04-30 RX ORDER — SODIUM CHLORIDE 9 MG/ML
INJECTION, SOLUTION INTRAVENOUS PRN
Status: DISCONTINUED | OUTPATIENT
Start: 2025-04-30 | End: 2025-04-30 | Stop reason: HOSPADM

## 2025-04-30 RX ADMIN — PROPOFOL 50 MG: 10 INJECTION, EMULSION INTRAVENOUS at 13:38

## 2025-04-30 RX ADMIN — PROPOFOL 100 MG: 10 INJECTION, EMULSION INTRAVENOUS at 13:22

## 2025-04-30 RX ADMIN — PROPOFOL 50 MG: 10 INJECTION, EMULSION INTRAVENOUS at 13:34

## 2025-04-30 RX ADMIN — LIDOCAINE HYDROCHLORIDE 100 MG: 20 INJECTION, SOLUTION EPIDURAL; INFILTRATION; INTRACAUDAL; PERINEURAL at 13:22

## 2025-04-30 RX ADMIN — SODIUM CHLORIDE: 9 INJECTION, SOLUTION INTRAVENOUS at 12:55

## 2025-04-30 RX ADMIN — PROPOFOL 50 MG: 10 INJECTION, EMULSION INTRAVENOUS at 13:26

## 2025-04-30 RX ADMIN — PROPOFOL 50 MG: 10 INJECTION, EMULSION INTRAVENOUS at 13:31

## 2025-04-30 ASSESSMENT — PAIN SCALES - GENERAL: PAINLEVEL_OUTOF10: 2

## 2025-04-30 ASSESSMENT — PAIN - FUNCTIONAL ASSESSMENT
PAIN_FUNCTIONAL_ASSESSMENT: 0-10
PAIN_FUNCTIONAL_ASSESSMENT: ACTIVITIES ARE NOT PREVENTED

## 2025-04-30 ASSESSMENT — PAIN DESCRIPTION - DESCRIPTORS: DESCRIPTORS: SORE

## 2025-04-30 ASSESSMENT — PAIN DESCRIPTION - PAIN TYPE: TYPE: ACUTE PAIN

## 2025-04-30 ASSESSMENT — PAIN DESCRIPTION - LOCATION: LOCATION: THROAT

## 2025-04-30 ASSESSMENT — PAIN DESCRIPTION - ORIENTATION: ORIENTATION: MID

## 2025-04-30 NOTE — DISCHARGE INSTRUCTIONS
Dieudonneestefanía Gary  [id number]  1985    It was my pleasure seeing you for your procedure.  You will also receive a summary report with the findings from this procedure and any further recommendations.  If you had polyps removed or biopsies taken during your procedure, you will receive a separate letter from me within the next 2 weeks.  If you don't receive this letter or if you have any questions, please call my office [phone].     Please take note of the post procedure instructions listed below.    Best Wishes,    Dr. Abrams      CARE FOLLOWING YOUR PROCEDURE    These instructions give you information on caring for yourself after your procedure. Call your doctor if you have any problems or questions after your procedure.    HOME CARE  Walk if you have belly cramping or gas.  Walking will help get rid of the air and reduce the bloated feeling in your belly (abdomen).  Your IV site (where you received drugs) may be tender to touch.  Place warm towels on the site; keep your arm up on two pillows if you have any swelling or soreness in the area.  You may shower.    ACTIVITY:  Take frequent rest periods and move at a slower pace for the next 24 hours..  You may resume your regular activity tomorrow if you are feeling back to normal.  Do not drive or ride a bicycle for at least 24 hours (because of the medicine (anesthesia) used during the test).  Do not sign any important legal documents or use or operate any machinery for 24 hours  Do not take sleeping medicines/nerve drugs for 24 hours unless the doctor tells you.  You can return to work/school tomorrow unless otherwise instructed.    NUTRITION:  Drink plenty of fluids to keep your pee (urine) clear or pale yellow  Begin with a light meal and progress to your normal diet. Heavy or fried foods are harder to digest and may make you feel sick to your stomach (nauseated).  Once you are feeling back to normal, you may resume your normal  diet as instructed by your doctor.  Avoid alcoholic beverages for 24 hours or as instructed.    IF YOU HAD BIOPSIES TAKEN OR POLYPS REMOVED DURING THE PROCEDURE:  For the next 7 days, avoid all non-steroidal antiinflammatory medications such as Ibuprofen, Motrin, Advil, Alleve, Laurie-seltzer, Goody's powder, BC powder.  If you do not have an heart condition that requires you to take a daily aspirin, you should avoid taking aspirin for 7 days.  Eat a soft diet for 24 hours.  Monitor your stools for any blood or dark black, tar-like, stools as this may be a sign of bleeding and if you see any blood, notify your doctor immediately.    GET HELP RIGHT AWAY AND SEEK IMMEDIATE MEDICAL CARE IF:  You have more than a spotting of blood in your stool.  You pass clumps of tissue (blood clots) or fill the toilet with blood.  Your belly is painfully swollen or puffy (abdominal distention).  You throw up (vomit).  You have a fever.  You have redness, pain or swelling at the IV site that last greater than two days.  You have abdominal pain or discomfort that is severe or gets worse throughout the day.    Post-procedure diagnosis:  See findings from report  - mild Schatzki's ring, dilated  - normal esophagus, biopsied  - small hiatal hernia  - normal stomach, biopsied  - normal duodenum, biopsied    Post-procedure recommendations in addition to above:  - Await pathology. You should receive a letter within 2 weeks.   - Resume normal medications.  - recommend omeprazole 20mg daily, sent to pharmacy  - lori johns/ [facility]    Patient Education on Sedation / Analgesia Administered for Procedure      For 24 hours after general anesthesia or intravenous analgesia / sedation:  Have someone responsible help you with your care  Limit your activities  Do not drive and operate hazardous machinery  Do not make important personal, legal or business decisions  Do not drink alcoholic beverages  If you have not urinated within 8 hours after

## 2025-04-30 NOTE — ANESTHESIA PRE PROCEDURE
Department of Anesthesiology  Preprocedure Note       Name:  Sohail Gary   Age:  39 y.o.  :  1985                                          MRN:  [id number]         Date:  2025      Surgeon: Surgeon(s):  Charlie Abrams MD    Procedure: Procedure(s):  ESOPHAGOGASTRODUODENOSCOPY    Medications prior to admission:   Prior to Admission medications    Medication Sig Start Date End Date Taking? Authorizing Provider   ferrous sulfate (IRON 325) 325 (65 Fe) MG tablet Take 1 tablet by mouth 2 times daily  Patient taking differently: Take 1 tablet by mouth as needed 24  Yes Olivia Iglesias N, APRN - NP   acetaminophen (TYLENOL) 500 MG tablet Take 2 tablets by mouth every 6 hours as needed for Pain 25   Hanane Patel PA-C   ibuprofen (ADVIL;MOTRIN) 800 MG tablet Take 1 tablet by mouth every 8 hours as needed for Pain 25   Hanane Patel PA-C   dilTIAZem (DILACOR XR) 180 MG extended release capsule Take 1 capsule by mouth daily    Provider, MD Suleiman       Current medications:    No current facility-administered medications for this encounter.       Allergies:    Allergies   Allergen Reactions   • Adhesive Tape Rash   • Cabbage Itching and Nausea And Vomiting       Problem List:    Patient Active Problem List   Diagnosis Code   • Cocaine use disorder, mild, abuse ([facility]) F14.10   • Current moderate episode of major depressive disorder ([facility]) F32.1   • Alcohol use disorder, severe, dependence ([facility]) F10.20   • Major depressive disorder, recurrent F33.9   • MDD (major depressive disorder), recurrent episode, mild F33.0   • S/P hysterectomy Z90.710       Past Medical History:        Diagnosis Date   • Arthritis     knees   • Bloating    • Other ill-defined conditions(799.89)     HSV   • Palpitations    • PONV (postoperative nausea and vomiting)    • SOB (shortness of breath)    • Tachycardia        Past Surgical History:        Procedure Laterality Date   •  SECTION      x 2   •

## 2025-04-30 NOTE — OP NOTE
NAME:  Sohail Gary   :   1985   MRN:   [id number]     Date/Time:  2025 1:41 PM    Esophagogastroduodenoscopy (EGD) Procedure Note    :  Charlie Abrams MD    Staff: Circulator: Giana Lopez RN; Taylor Swan RN    Referring Provider:  Kurt Victoria MD    Anethesia/Sedation:  MAC anesthesia Propofol    Procedure Details   After infomed consent was obtained for the procedure, with all risks and benefits of procedure explained the patient was taken to the endoscopy suite and placed in the left lateral decubitus position.  Following sequential administration of sedation as per above, the gastroscope was inserted into the mouth and advanced under direct vision to third portion of the duodenum.  A careful inspection was made as the gastroscope was withdrawn, including a retroflexed view of the proximal stomach; findings and interventions are described below.      Findings:  Esophagus:   - Normal proximal, mid, distal esophageal mucosa biopsied cold forceps distal and proximal esophagus separately with minimal bleeding to r/o eosinophilic esophagitis vs GERD.  - EGJ at 38cm with a 1-2cm small sliding hiatal hernia  - EGJ with a mild schatzki's ring, Decision was made to dilate and preparations were made. A 18-20mm TTS CRE fixed wire balloon was placed in the stomach and pulled across the EGJ. Incremental dilations performed from 18mm to final dilation of 20mm, each held for 60 seconds, with balloon deflation to inspect the mucosa after each dilation. After final dilation, final inspection revealed a mild dilation effect with scant oozing and no sign of perforation and neck exam without crepitus.  Stomach:  - patent pylorus, normal mucosa, biopsied cold forceps with minimal bleeding antrum, incisura, body, cardia, to r/o H pylori.  Duodenum/jejunum:   - D3 is normal, biopsied cold forceps x8 with minimal bleeding into dry container on ice for disaccharidase

## 2025-04-30 NOTE — PROGRESS NOTES
CRE balloon dilatation of the esophagus   18 mm Balloon inflated to 3 ATMs and held for 5 seconds.  19 mm Balloon inflated to 4.5 ATMs and held for 5 seconds.  20 mm Balloon inflated to 6 ATMs and held for 60 seconds.    No subcutaneous crepitus of the chest or cervical region was noted post dilatation.

## 2025-04-30 NOTE — ANESTHESIA POSTPROCEDURE EVALUATION
Department of Anesthesiology  Postprocedure Note    Patient: Sohail Gary  MRN: [id number]  YOB: 1985  Date of evaluation: 4/30/2025    Procedure Summary       Date: 04/30/25 Room / Location: [facility] ENDO 02 / [facility] ENDOSCOPY    Anesthesia Start: 1315 Anesthesia Stop: 1344    Procedures:       ESOPHAGOGASTRODUODENOSCOPY BIOPSY (Upper GI Region)      ESOPHAGOGASTRODUODENOSCOPY DILATION BALLOON Diagnosis:       Bloating      Globus sensation      Alcohol abuse      Anemia, unspecified type      Hiatal hernia      Stricture and stenosis of esophagus      Schatzki's ring      (Bloating [R14.0])      (Globus sensation [R09.A2])      (Alcohol abuse [F10.10])      (Anemia, unspecified type [D64.9])    Surgeons: Charlie Abrams MD Responsible Provider: Greg Olsen MD    Anesthesia Type: MAC, TIVA ASA Status: 2            Anesthesia Type: MAC, TIVA    Maurizio Phase I: Maurizio Score: 10    Maurizio Phase II: Maurizio Score: 10    Anesthesia Post Evaluation    Patient location during evaluation: PACU  Patient participation: complete - patient participated  Level of consciousness: awake  Airway patency: patent  Nausea & Vomiting: no vomiting  Cardiovascular status: hemodynamically stable  Respiratory status: acceptable  Hydration status: euvolemic    No notable events documented.

## 2025-04-30 NOTE — PROGRESS NOTES
Endoscopy Case End Note:    1341:  Procedure scope was pre-cleaned, per protocol, at bedside by RALPH Swan RN.      1342:  Report received from anesthesia - E Jefry GARZA.  See anesthesia flowsheet for intra-procedure vital signs and events.

## 2025-04-30 NOTE — H&P
[facility]  Outpatient History and Physical    Patient: Sohail Gary    Physician: Charlie Abrams MD    Vital Signs: Last menstrual period 2024.    Allergies:   Allergies   Allergen Reactions    Adhesive Tape Rash    Cabbage Itching and Nausea And Vomiting       Chief Complaint: EGD to assess:  Bloating  Globus sensation  Alcohol abuse  Anemia, unspecified     History:  Past Medical History:   Diagnosis Date    Arthritis     knees    Bloating     Other ill-defined conditions(799.89)     HSV    Palpitations     PONV (postoperative nausea and vomiting)     SOB (shortness of breath)     Tachycardia       Past Surgical History:   Procedure Laterality Date     SECTION      x 2    HYSTERECTOMY (CERVIX STATUS UNKNOWN) N/A 2025    ROBOTIC TOTAL LAPAROSCOPIC HYSTERECTOMY, BILATERAL SALPINGECTOMY, LEFT OOPHORECTOMY, CYSTOSCOPY performed by Kellie Hernandez MD at [facility] MAIN OR    HYSTEROSCOPY N/A 2024    HYSTEROSCOPY, DILATATION AND CURETTAGE performed by Kellie [facility] MD at [facility] MA[facility] OR    TOOTH EXTRACTION  2025    wisdom tooth      Social History     Socioeconomic History    Marital status:      Spouse name: None    Number of children: None    Years of education: None    Highest education level: None   Tobacco Use    Smoking status: Former     Types: Cigars     Passive exposure: Never    Smokeless tobacco: Never    Tobacco comments:     Black and milds - does not remember how long she smoked or when she quit   Vaping Use    Vaping status: Never Used   Substance and Sexual Activity    Alcohol use: Yes     Alcohol/week: 6.0 standard drinks of alcohol     Types: 6 Drinks containing 0.5 oz of alcohol per week    Drug use: Not Currently     Types: Cocaine, Marijuana (Weed)     Comment: cocaine, 2024, IN past    Sexual activity: Yes     Partners: Male     Social Drivers of Health     Food Insecurity: Food Insecurity Present (2024)    Hunger  Vital Sign     Worried About Running Out of Food in the Last Year: Sometimes true     Ran Out of Food in the Last Year: Sometimes true   Transportation Needs: No Transportation Needs (9/4/2024)    PRAPARE - Transportation     Lack of Transportation (Medical): No     Lack of Transportation (Non-Medical): No   Housing Stability: Low Risk  (9/4/2024)    Housing Stability Vital Sign     Unable to Pay for Housing in the Last Year: No     Number of Times Moved in the Last Year: 1     Homeless in the Last Year: No      Family History   Family history unknown: Yes       Medications:   Prior to Admission medications    Medication Sig Start Date End Date Taking? Authorizing Provider   ferrous sulfate (IRON 325) 325 (65 Fe) MG tablet Take 1 tablet by mouth 2 times daily  Patient taking differently: Take 1 tablet by mouth as needed 9/21/24  Yes Olivia Iglesias APRN - NP   acetaminophen (TYLENOL) 500 MG tablet Take 2 tablets by mouth every 6 hours as needed for Pain 4/22/25   Hanaen Patel PA-C   ibuprofen (ADVIL;MOTRIN) 800 MG tablet Take 1 tablet by mouth every 8 hours as needed for Pain 4/22/25   Hanane Patel PA-C   dilTIAZem (DILACOR XR) 180 MG extended release capsule Take 1 capsule by mouth daily    Provider, MD Suleiman       Physical Exam:   General: no distress   HEENT: Head: Normocephalic, no lesions, without obvious abnormality.   Heart: rate normal, no leg edema    Lungs: symmetric chest rise, no work of breathing   Abdominal:  soft, NT, ND   Neurological: Grossly normal   Extremities: extremities normal, atraumatic, no cyanosis or edema     Plan of Care/Planned Procedure: egd  The heart, lungs and mental status were satisfactory for the administration of MAC sedation and for the procedure by the Anesthesiology team.      Informed consent was obtained for the procedure, including sedation.  Risks of perforation, hemorrhage, adverse drug reaction, and aspiration were discussed.  The risks, benefits and

## 2025-06-04 LAB
DIGESTIVE ENZYMES: NORMAL
GLUCOAMYLASE: 17.4
LACTASE: 0.2
PALATINASE: 3.4
SUCRASE: 14.5

## (undated) DEVICE — CONTAINER SPEC 20 ML LID NEUT BUFF FORMALIN 10 % POLYPR STS

## (undated) DEVICE — BLADELESS OBTURATOR: Brand: WECK VISTA

## (undated) DEVICE — SOLUTION IRRIG 3000ML 0.9% SOD CHL USP UROMATIC PLAS CONT

## (undated) DEVICE — X-RAY DETECTABLE SPONGES,16 PLY: Brand: VISTEC

## (undated) DEVICE — GLOVE SURG SZ 7 L12IN FNGR THK79MIL GRN LTX FREE

## (undated) DEVICE — FLUID MGMT SYS FLUENT KIT 6/PK

## (undated) DEVICE — TRI-LUMEN FILTERED TUBE SET WITH ACTIVATED CHARCOAL FILTER: Brand: AIRSEAL

## (undated) DEVICE — ELECTRO LUBE IS A SINGLE PATIENT USE DEVICE THAT IS INTENDED TO BE USED ON ELECTROSURGICAL ELECTRODES TO REDUCE STICKING.: Brand: KEY SURGICAL ELECTRO LUBE

## (undated) DEVICE — AIRSEAL 8 MM ACCESS PORT AND LOW PROFILE OBTURATOR WITH BLADELESS OPTICAL TIP, 100 MM LENGTH: Brand: AIRSEAL

## (undated) DEVICE — SUTURE STRATAFIX SPRL PDS + SZ 2-0 L9IN ABSRB VLT CT-1 SXPP1B456

## (undated) DEVICE — DEVICE INFL 60ML 15ATM DISPOSABLE STERIFLATE CRE

## (undated) DEVICE — BITEBLOCK 54FR W/ DENT RIM BLOX

## (undated) DEVICE — SOLUTION IV 1000ML 0.9% SOD CHL PH 5 INJ USP VIAFLX PLAS

## (undated) DEVICE — GLOVE SURG SZ 65 THK91MIL LTX FREE SYN POLYISOPRENE

## (undated) DEVICE — TIP COVER ACCESSORY

## (undated) DEVICE — GYN LAPAROSCOPY - SMH: Brand: MEDLINE INDUSTRIES, INC.

## (undated) DEVICE — SUTURE VICRYL + SZ 0 L27IN ABSRB VLT L26MM UR-6 5/8 CIR VCP603H

## (undated) DEVICE — COVIDIEN KENDALL DL DISPOSABLE 3 LEAD SY: Brand: MEDLINE RENEWAL

## (undated) DEVICE — CATHETER IV 22GA L1IN OD0.8382-0.9144MM ID0.6096-0.6858MM 382523

## (undated) DEVICE — ARM DRAPE

## (undated) DEVICE — SEAL

## (undated) DEVICE — ESOPHAGEAL BALLOON DILATATION CATHETER: Brand: CRE FIXED WIRE

## (undated) DEVICE — GLOVE SURG SZ 65 L12IN FNGR THK94MIL STD WHT LTX FREE

## (undated) DEVICE — DEVICE TISS REM L32CM DIA3MM S STL ULT HRD HI WR RESISTANCE

## (undated) DEVICE — SET GRAV CK VLV NEEDLESS ST 3 GANGED 4WAY STPCOCK HI FLO 10

## (undated) DEVICE — SYRINGE MED 10ML LUERLOCK TIP W/O SFTY DISP

## (undated) DEVICE — MYOSURE REACH

## (undated) DEVICE — PAD BD MATTRESS 73X32 IN STD CONVOLUTED FOAM LTX FREE

## (undated) DEVICE — IV START KIT: Brand: MEDLINE

## (undated) DEVICE — VCARE MEDIUM, UTERINE MANIPULATOR, VAGINAL-CERVICAL-AHLUWALIA'S-RETRACTOR-ELEVATOR: Brand: VCARE

## (undated) DEVICE — D&C MRMC: Brand: MEDLINE INDUSTRIES, INC.

## (undated) DEVICE — GLOVE ORANGE PI 7 1/2   MSG9075

## (undated) DEVICE — SUTURE MONOCRYL + SZ 4-0 L27IN ABSRB UD L19MM PS-2 3/8 CIR MCP426H

## (undated) DEVICE — AGENT HEMSTAT 3GM PURIFIED PLNT STARCH PWD ABSRB ARISTA AH

## (undated) DEVICE — Device

## (undated) DEVICE — SET ENDOSCP SEAL HYSTEROSCOPE RIG OUTFLO CHN DISP MYOSURE

## (undated) DEVICE — NEEDLE INSUFFLATION 14 GAX120 MM SURGINEEDLE

## (undated) DEVICE — ENDOSCOPIC KIT COMPLIANCE ENDOKIT

## (undated) DEVICE — APPLICATOR SURG L38CM RIG ATOMIZING SGL USE XL-R FLEXITIP

## (undated) DEVICE — FORCEPS BX PED L160CM JAW DIA1.8MM WRK CHN 2MM W/ NDL DISP

## (undated) DEVICE — PAD PT POS 36 IN SURGYPAD DISP